# Patient Record
Sex: FEMALE | Race: WHITE | HISPANIC OR LATINO | Employment: FULL TIME | URBAN - METROPOLITAN AREA
[De-identification: names, ages, dates, MRNs, and addresses within clinical notes are randomized per-mention and may not be internally consistent; named-entity substitution may affect disease eponyms.]

---

## 2017-05-09 ENCOUNTER — OFFICE VISIT (OUTPATIENT)
Dept: OBSTETRICS AND GYNECOLOGY | Facility: CLINIC | Age: 27
End: 2017-05-09
Payer: MEDICAID

## 2017-05-09 VITALS — DIASTOLIC BLOOD PRESSURE: 72 MMHG | WEIGHT: 219.81 LBS | SYSTOLIC BLOOD PRESSURE: 118 MMHG

## 2017-05-09 DIAGNOSIS — N97.9 INFERTILITY, FEMALE: Primary | ICD-10-CM

## 2017-05-09 PROCEDURE — 99212 OFFICE O/P EST SF 10 MIN: CPT | Mod: S$PBB,,, | Performed by: OBSTETRICS & GYNECOLOGY

## 2017-05-09 PROCEDURE — 99999 PR PBB SHADOW E&M-EST. PATIENT-LVL III: CPT | Mod: PBBFAC,,, | Performed by: OBSTETRICS & GYNECOLOGY

## 2017-05-09 PROCEDURE — 99213 OFFICE O/P EST LOW 20 MIN: CPT | Mod: PBBFAC,PO | Performed by: OBSTETRICS & GYNECOLOGY

## 2017-05-09 RX ORDER — CLOMIPHENE CITRATE 50 MG/1
TABLET ORAL
Qty: 5 TABLET | Refills: 3 | Status: SHIPPED | OUTPATIENT
Start: 2017-05-09 | End: 2017-09-26

## 2017-05-09 NOTE — MR AVS SNAPSHOT
[place] [facility]  [address] [facility] [place], [address]  5th Floor [place] [address]  Phone: [phone]                  Brannon Lei   2017 1:00 PM   Office Visit    Description:  Female : 1990   Provider:  Kaila Cuevas MD   Department:  [place] [facility]           Reason for Visit     Consult           Diagnoses this Visit        Comments    Infertility, female    -  Primary            To Do List           Goals (5 Years of Data)     None       These Medications        Disp Refills Start End    clomiPHENE (CLOMID) 50 mg tablet 5 tablet 3 2017     Take 1 tablet in the am from Day 3 to Day 7 of your cycle. Have sex every other day for one week starting five days after last pill.    [facility] [address] [place] [address] AIRLINE  AT [facility] & AIRLINE Ph #: [phone]         [facility] On Call     [facility] On Call Nurse Care Line -  Assistance  Unless otherwise directed by your provider, please contact [facility], our nurse care line that is available for  assistance.     Registered nurses in the [facility] provide: appointment scheduling, clinical advisement, health education, and other advisory services.  Call: [phone] (toll free)               Medications           Message regarding Medications     Verify the changes and/or additions to your medication regime listed below are the same as discussed with your clinician today.  If any of these changes or additions are incorrect, please notify your healthcare provider.        START taking these NEW medications        Refills    clomiPHENE (CLOMID) 50 mg tablet 3    Sig: Take 1 tablet in the am from Day 3 to Day 7 of your cycle. Have sex every other day for one week starting five days after last pill.    Class: Normal      STOP taking these medications     FLONASE ALLERGY RELIEF 50 mcg/actuation nasal spray     hydrocodone-acetaminophen 5-325mg (NORCO) 5-325 mg per  tablet     ibuprofen (ADVIL,MOTRIN) 800 MG tablet Take 800 mg by mouth 3 (three) times daily.    ibuprofen (ADVIL,MOTRIN) 800 MG tablet Take 1 tablet (800 mg total) by mouth every 8 (eight) hours as needed for Pain.    misoprostol (CYTOTEC) 200 MCG Tab Place 2 tablets by mouth every 6 hours for 3 days    norgestimate-ethinyl estradiol (ORTHO TRI-CYCLEN,TRI-SPRINTEC) 0.18/0.215/0.25 mg-35 mcg (28) tablet     norgestimate-ethinyl estradiol (ORTHO-CYCLEN) 0.25-35 mg-mcg per tablet Take 1 tablet by mouth once daily.    oxycodone-acetaminophen (ROXICET) 5-325 mg per tablet Take 1 tablet by mouth every 6 (six) hours as needed for Pain.           Verify that the below list of medications is an accurate representation of the medications you are currently taking.  If none reported, the list may be blank. If incorrect, please contact your healthcare provider. Carry this list with you in case of emergency.           Current Medications     clomiPHENE (CLOMID) 50 mg tablet Take 1 tablet in the am from Day 3 to Day 7 of your cycle. Have sex every other day for one week starting five days after last pill.           Clinical Reference Information           Prenatal Vitals     Enc. Date GA Prenatal Vitals Prenatal Pulse Pain Level Urine Albumin/Glucose Edema Presentation Dilation/Effacement/Station    5/9/17  118/72 / 99.7 kg (219 lb 12.8 oz)           12/22/16  116/76 / 94.8 kg (208 lb 15.9 oz)           12/14/16  112/68 / 99 kg (218 lb 4.1 oz)   8        12/8/16  92/62 / 98 kg (216 lb 0.8 oz)   0 Negative / Negative         Your Vitals Were     BP Weight Last Period             118/72 99.7 kg (219 lb 12.8 oz) 04/11/2017         Blood Pressure          Most Recent Value    BP  118/72      Allergies as of 5/9/2017     No Known Allergies      Immunizations Administered on Date of Encounter - 5/9/2017     None      [facility] Sign-Up     Activating your [facility] account is as easy as 1-2-3!     1) Visit [email or web address], select Sign Up  Now, enter this activation code and your date of birth, then select Next.  [email or web address]  Expires: 6/23/2017  2:20 PM      2) Create a username and password to use when you visit [facility] in the future and select a security question in case you lose your password and select Next.    3) Enter your e-mail address and click Sign Up!    Additional Information  If you have questions, please e-mail [email or web address] or call [phone] to talk to our [facility] staff. Remember, [facility] is NOT to be used for urgent needs. For medical emergencies, dial 911.         Instructions    Ovulation predictor kit       Language Assistance Services     ATTENTION: Language assistance services are available, free of charge. Please call [phone].      ATENCIÓN: Si habla lisa, tiene a bronson disposición servicios gratuitos de asistencia lingüística. Llame al [phone].     CHÚ Ý: N?u b?n nói Ti?ng Vi?t, có các d?ch v? h? tr? ngôn ng? mi?n phí dành cho b?n. G?i s? [phone].         [facility] complies with applicable Federal civil rights laws and does not discriminate on the basis of race, color, national origin, age, disability, or sex.

## 2017-05-09 NOTE — PROGRESS NOTES
Patient concerned that she has not had success since her SAB in Dec 2016.  Reports cycles q month. Using samreen on her phone.  We have reviewed the samreen together.  It is possible that patient is not sexually active at times when she is ovulating.  Having sex too late in the cycle.  Encouraged to use ovulation predictor kit.  rx for clomid provided (on patient's insistence).  Instructed on use.    F/u as needed.    margarito ordaz MD

## 2017-07-08 DIAGNOSIS — R10.9 ABDOMINAL PAIN, UNSPECIFIED LOCATION: ICD-10-CM

## 2017-07-28 RX ORDER — IBUPROFEN 800 MG/1
TABLET ORAL
Qty: 30 TABLET | Refills: 6 | Status: SHIPPED | OUTPATIENT
Start: 2017-07-28 | End: 2017-09-26

## 2017-09-05 ENCOUNTER — OFFICE VISIT (OUTPATIENT)
Dept: OBSTETRICS AND GYNECOLOGY | Facility: CLINIC | Age: 27
End: 2017-09-05
Payer: MEDICAID

## 2017-09-05 VITALS — DIASTOLIC BLOOD PRESSURE: 64 MMHG | SYSTOLIC BLOOD PRESSURE: 112 MMHG | WEIGHT: 227.31 LBS

## 2017-09-05 DIAGNOSIS — Z34.01 ENCOUNTER FOR SUPERVISION OF NORMAL FIRST PREGNANCY IN FIRST TRIMESTER: Primary | ICD-10-CM

## 2017-09-05 DIAGNOSIS — R11.0 NAUSEA: ICD-10-CM

## 2017-09-05 DIAGNOSIS — N91.2 AMENORRHEA: ICD-10-CM

## 2017-09-05 PROCEDURE — 99204 OFFICE O/P NEW MOD 45 MIN: CPT | Mod: TH,S$PBB,, | Performed by: OBSTETRICS & GYNECOLOGY

## 2017-09-05 PROCEDURE — 87086 URINE CULTURE/COLONY COUNT: CPT

## 2017-09-05 PROCEDURE — 99999 PR PBB SHADOW E&M-EST. PATIENT-LVL III: CPT | Mod: PBBFAC,,, | Performed by: OBSTETRICS & GYNECOLOGY

## 2017-09-05 PROCEDURE — 3008F BODY MASS INDEX DOCD: CPT | Mod: ,,, | Performed by: OBSTETRICS & GYNECOLOGY

## 2017-09-05 PROCEDURE — 87591 N.GONORRHOEAE DNA AMP PROB: CPT

## 2017-09-05 PROCEDURE — 99213 OFFICE O/P EST LOW 20 MIN: CPT | Mod: PBBFAC,PO | Performed by: OBSTETRICS & GYNECOLOGY

## 2017-09-05 RX ORDER — PROMETHAZINE HYDROCHLORIDE 12.5 MG/1
12.5 TABLET ORAL EVERY 6 HOURS PRN
Qty: 30 TABLET | Refills: 1 | Status: SHIPPED | OUTPATIENT
Start: 2017-09-05 | End: 2017-10-05

## 2017-09-05 NOTE — PROGRESS NOTES
27 yo  female with amenorrhea who presents to confirm her pregnancy.  First pregnancy earlier this year was a spontaneous miscarriage.  No other concerns today.  No changes in her PMH since her last visit here.    ROS:  GENERAL: Denies weight gain or weight loss. Feeling well overall.   SKIN: Denies rash or lesions.   CHEST: Denies chest pain or shortness of breath.   CARDIOVASCULAR: Denies palpitations or left sided chest pain.   ABDOMEN: No abdominal pain, constipation, diarrhea, nausea, vomiting or rectal bleeding.   URINARY: No frequency, dysuria, hematuria, or burning on urination.  REPRODUCTIVE: See HPI.   BREASTS:  denies pain, lumps, or nipple discharge.   HEMATOLOGIC: No easy bruisability or excessive bleeding.   MUSCULOSKELETAL: Denies joint pain or swelling.   NEUROLOGIC: Denies syncope or weakness.   PSYCHIATRIC: Denies depression, anxiety or mood swings.     PE  /64   Wt 103.1 kg (227 lb 4.7 oz)   LMP 2017   APPEARANCE: Well nourished, well developed, in no acute distress.  ABDOMEN: Soft. No tenderness or masses. No hepatosplenomegaly. No hernias.  BREASTS: Symmetrical, no skin changes or visible lesions. No palpable masses, nipple discharge or adenopathy bilaterally.  PELVIC: Normal external female genitalia without lesions. Normal hair distribution. Adequate perineal body, normal urethral meatus. Vagina moist and well rugated without lesions or discharge. Cervix pink and without lesions. No significant cystocele or rectocele. Bimanual exam showed uterus normal size, shape, position, mobile and nontender. Adnexa without masses or tenderness. Urethra and bladder normal.  EXTREMITIES: No clubbing cyanosis or edema.    AP:  Amenorrhea  Office UPT positive, based on LMP, possible EDC 18  Dating scan ordered  New Ob labs ordered  rx for phenergan for nausea provided  H/o SAB  Obesity    F/u in 3 wks for dating scan    margarito ordaz MD

## 2017-09-06 ENCOUNTER — LAB VISIT (OUTPATIENT)
Dept: LAB | Facility: HOSPITAL | Age: 27
End: 2017-09-06
Attending: OBSTETRICS & GYNECOLOGY
Payer: MEDICAID

## 2017-09-06 DIAGNOSIS — Z34.01 ENCOUNTER FOR SUPERVISION OF NORMAL FIRST PREGNANCY IN FIRST TRIMESTER: ICD-10-CM

## 2017-09-06 LAB
ABO + RH BLD: NORMAL
BASOPHILS # BLD AUTO: 0.02 K/UL
BASOPHILS NFR BLD: 0.2 %
BLD GP AB SCN CELLS X3 SERPL QL: NORMAL
C TRACH DNA SPEC QL NAA+PROBE: NOT DETECTED
DIFFERENTIAL METHOD: ABNORMAL
EOSINOPHIL # BLD AUTO: 0.2 K/UL
EOSINOPHIL NFR BLD: 1.9 %
ERYTHROCYTE [DISTWIDTH] IN BLOOD BY AUTOMATED COUNT: 14.1 %
HCG INTACT+B SERPL-ACNC: 1102 MIU/ML
HCT VFR BLD AUTO: 34.9 %
HGB BLD-MCNC: 11.7 G/DL
LYMPHOCYTES # BLD AUTO: 3.1 K/UL
LYMPHOCYTES NFR BLD: 38.3 %
MCH RBC QN AUTO: 28.4 PG
MCHC RBC AUTO-ENTMCNC: 33.5 G/DL
MCV RBC AUTO: 85 FL
MONOCYTES # BLD AUTO: 0.6 K/UL
MONOCYTES NFR BLD: 6.9 %
N GONORRHOEA DNA SPEC QL NAA+PROBE: NOT DETECTED
NEUTROPHILS # BLD AUTO: 4.2 K/UL
NEUTROPHILS NFR BLD: 52.6 %
PLATELET # BLD AUTO: 265 K/UL
PMV BLD AUTO: 10.8 FL
RBC # BLD AUTO: 4.12 M/UL
WBC # BLD AUTO: 8.06 K/UL

## 2017-09-06 PROCEDURE — 86850 RBC ANTIBODY SCREEN: CPT

## 2017-09-06 PROCEDURE — 81220 CFTR GENE COM VARIANTS: CPT

## 2017-09-06 PROCEDURE — 86900 BLOOD TYPING SEROLOGIC ABO: CPT

## 2017-09-06 PROCEDURE — 86787 VARICELLA-ZOSTER ANTIBODY: CPT

## 2017-09-06 PROCEDURE — 86703 HIV-1/HIV-2 1 RESULT ANTBDY: CPT

## 2017-09-06 PROCEDURE — 86762 RUBELLA ANTIBODY: CPT

## 2017-09-06 PROCEDURE — 83020 HEMOGLOBIN ELECTROPHORESIS: CPT

## 2017-09-06 PROCEDURE — 87340 HEPATITIS B SURFACE AG IA: CPT

## 2017-09-06 PROCEDURE — 86592 SYPHILIS TEST NON-TREP QUAL: CPT

## 2017-09-06 PROCEDURE — 84702 CHORIONIC GONADOTROPIN TEST: CPT

## 2017-09-06 PROCEDURE — 85025 COMPLETE CBC W/AUTO DIFF WBC: CPT

## 2017-09-06 PROCEDURE — 83021 HEMOGLOBIN CHROMOTOGRAPHY: CPT

## 2017-09-07 ENCOUNTER — TELEPHONE (OUTPATIENT)
Dept: OBSTETRICS AND GYNECOLOGY | Facility: CLINIC | Age: 27
End: 2017-09-07

## 2017-09-07 LAB
BACTERIA UR CULT: NORMAL
HBV SURFACE AG SERPL QL IA: NEGATIVE
HIV 1+2 AB+HIV1 P24 AG SERPL QL IA: NEGATIVE
RPR SER QL: NORMAL
RUBV IGG SER-ACNC: 10.2 IU/ML
RUBV IGG SER-IMP: REACTIVE
VARICELLA INTERPRETATION: POSITIVE
VARICELLA ZOSTER IGG: 2.86 ISR

## 2017-09-07 NOTE — TELEPHONE ENCOUNTER
Returned patients call.   Patient notified of normal lab values. Patient verbalized understanding and will follow up in 3 weeks.

## 2017-09-07 NOTE — TELEPHONE ENCOUNTER
----- Message from Adrienne Valle sent at 9/7/2017  1:16 PM CDT -----  Contact: Self/ [phone]  Patient would like to speak with you about getting her lab results. Please advise.

## 2017-09-08 LAB — CFTR MUT ANL BLD/T: NORMAL

## 2017-09-11 LAB
HGB A2 MFR BLD HPLC: 2.6 %
HGB FRACT BLD ELPH-IMP: NORMAL
HGB FRACT BLD ELPH-IMP: NORMAL

## 2017-09-14 ENCOUNTER — TELEPHONE (OUTPATIENT)
Dept: OBSTETRICS AND GYNECOLOGY | Facility: CLINIC | Age: 27
End: 2017-09-14

## 2017-09-14 NOTE — TELEPHONE ENCOUNTER
----- Message from Pooja Lorenzo sent at 9/14/2017  3:18 PM CDT -----  No. [phone]    Patient is 5 weeks pregnant.   Patient's ankles are very swollen.

## 2017-09-14 NOTE — TELEPHONE ENCOUNTER
Advised pt to increase her fluid intake and elevating her feet. Pt is complaining of having a headache but it went away with a tylenol. Advised pt that if she begins having blurry vision or headaches that do not go away with tylenol she needs to call back. Please give any further recommendations

## 2017-09-26 ENCOUNTER — OFFICE VISIT (OUTPATIENT)
Dept: OBSTETRICS AND GYNECOLOGY | Facility: CLINIC | Age: 27
End: 2017-09-26
Payer: MEDICAID

## 2017-09-26 ENCOUNTER — ROUTINE PRENATAL (OUTPATIENT)
Dept: OBSTETRICS AND GYNECOLOGY | Facility: CLINIC | Age: 27
End: 2017-09-26
Payer: MEDICAID

## 2017-09-26 VITALS — SYSTOLIC BLOOD PRESSURE: 110 MMHG | DIASTOLIC BLOOD PRESSURE: 72 MMHG

## 2017-09-26 DIAGNOSIS — Z34.01 ENCOUNTER FOR SUPERVISION OF NORMAL FIRST PREGNANCY IN FIRST TRIMESTER: Primary | ICD-10-CM

## 2017-09-26 DIAGNOSIS — Z3A.08 8 WEEKS GESTATION OF PREGNANCY: ICD-10-CM

## 2017-09-26 DIAGNOSIS — Z36.87 UNSURE OF LMP (LAST MENSTRUAL PERIOD) AS REASON FOR ULTRASOUND SCAN: Primary | ICD-10-CM

## 2017-09-26 DIAGNOSIS — Z36.89 ENCOUNTER TO ESTABLISH GESTATIONAL AGE USING ULTRASOUND: ICD-10-CM

## 2017-09-26 DIAGNOSIS — Z34.01 ENCOUNTER FOR SUPERVISION OF NORMAL FIRST PREGNANCY IN FIRST TRIMESTER: ICD-10-CM

## 2017-09-26 PROCEDURE — 76817 TRANSVAGINAL US OBSTETRIC: CPT | Mod: PBBFAC,PO

## 2017-09-26 PROCEDURE — 76817 TRANSVAGINAL US OBSTETRIC: CPT | Mod: 26,S$PBB,, | Performed by: OBSTETRICS & GYNECOLOGY

## 2017-09-26 PROCEDURE — 99999 PR PBB SHADOW E&M-EST. PATIENT-LVL I: CPT | Mod: PBBFAC,,, | Performed by: OBSTETRICS & GYNECOLOGY

## 2017-09-26 PROCEDURE — 99211 OFF/OP EST MAY X REQ PHY/QHP: CPT | Mod: PBBFAC,PO | Performed by: OBSTETRICS & GYNECOLOGY

## 2017-09-26 PROCEDURE — 3008F BODY MASS INDEX DOCD: CPT | Mod: ,,, | Performed by: OBSTETRICS & GYNECOLOGY

## 2017-09-26 PROCEDURE — 99213 OFFICE O/P EST LOW 20 MIN: CPT | Mod: 25,TH,S$PBB, | Performed by: OBSTETRICS & GYNECOLOGY

## 2017-09-26 NOTE — PROGRESS NOTES
No Ob complaints today.    25 yo  female @ 8 wks by 8 wk leighanno (EDC  who presents for f/u OB visit.    AP:  1) SIUP  -s/p official dating scan  -Rh positive    2) h/o SAB  3) obesity    F/u in 4 wks, discuss genetic testing at next visit    margarito ordaz MD

## 2017-10-23 ENCOUNTER — ROUTINE PRENATAL (OUTPATIENT)
Dept: OBSTETRICS AND GYNECOLOGY | Facility: CLINIC | Age: 27
End: 2017-10-23
Payer: MEDICAID

## 2017-10-23 VITALS — SYSTOLIC BLOOD PRESSURE: 120 MMHG | DIASTOLIC BLOOD PRESSURE: 78 MMHG | WEIGHT: 225.5 LBS

## 2017-10-23 DIAGNOSIS — Z3A.11 11 WEEKS GESTATION OF PREGNANCY: ICD-10-CM

## 2017-10-23 DIAGNOSIS — Z34.01 ENCOUNTER FOR SUPERVISION OF NORMAL FIRST PREGNANCY IN FIRST TRIMESTER: Primary | ICD-10-CM

## 2017-10-23 PROCEDURE — 99213 OFFICE O/P EST LOW 20 MIN: CPT | Mod: TH,S$PBB,, | Performed by: OBSTETRICS & GYNECOLOGY

## 2017-10-23 PROCEDURE — 99212 OFFICE O/P EST SF 10 MIN: CPT | Mod: PBBFAC,PO | Performed by: OBSTETRICS & GYNECOLOGY

## 2017-10-23 PROCEDURE — 99999 PR PBB SHADOW E&M-EST. PATIENT-LVL II: CPT | Mod: PBBFAC,,, | Performed by: OBSTETRICS & GYNECOLOGY

## 2017-10-23 NOTE — PROGRESS NOTES
No Ob complaints today.     25 yo  female @ 11.6 wks by 8 wk sono (EDC  who presents for f/u OB visit.     AP:  1) SIUP  -s/p official dating scan  -Rh positive  -desires genetic testing: scheduled     2) h/o SAB    3) obesity     F/u in 1 wk MFM  F/u in 4 wks Ob visit Dr Mason ordaz MD

## 2017-11-01 ENCOUNTER — OFFICE VISIT (OUTPATIENT)
Dept: MATERNAL FETAL MEDICINE | Facility: HOSPITAL | Age: 27
End: 2017-11-01
Payer: MEDICAID

## 2017-11-01 ENCOUNTER — LAB VISIT (OUTPATIENT)
Dept: LAB | Facility: HOSPITAL | Age: 27
End: 2017-11-01
Attending: OBSTETRICS & GYNECOLOGY
Payer: MEDICAID

## 2017-11-01 VITALS
HEIGHT: 61 IN | HEART RATE: 87 BPM | SYSTOLIC BLOOD PRESSURE: 118 MMHG | BODY MASS INDEX: 42.1 KG/M2 | DIASTOLIC BLOOD PRESSURE: 60 MMHG | WEIGHT: 223 LBS

## 2017-11-01 DIAGNOSIS — O09.299 PRIOR POOR OBSTETRICAL HISTORY, ANTEPARTUM: ICD-10-CM

## 2017-11-01 DIAGNOSIS — Z34.01 ENCOUNTER FOR SUPERVISION OF NORMAL FIRST PREGNANCY IN FIRST TRIMESTER: ICD-10-CM

## 2017-11-01 PROCEDURE — 76813 OB US NUCHAL MEAS 1 GEST: CPT

## 2017-11-01 PROCEDURE — 99213 OFFICE O/P EST LOW 20 MIN: CPT | Mod: 25,TH,S$PBB, | Performed by: OBSTETRICS & GYNECOLOGY

## 2017-11-01 PROCEDURE — 81508 FTL CGEN ABNOR TWO PROTEINS: CPT

## 2017-11-01 PROCEDURE — 76813 OB US NUCHAL MEAS 1 GEST: CPT | Mod: 26,,, | Performed by: OBSTETRICS & GYNECOLOGY

## 2017-11-01 PROCEDURE — 36415 COLL VENOUS BLD VENIPUNCTURE: CPT

## 2017-11-01 RX ORDER — FERROUS GLUCONATE 324(38)MG
324 TABLET ORAL
COMMUNITY
End: 2018-06-04

## 2017-11-01 NOTE — PROGRESS NOTES
Consulted by Dr. Cuevas for prior anembryonic pregnancy    27  BISHOP 18; 13w1d    Prenatal record, chart and OB History reviewed  Maternal serum screening today  Pt interviewed and examined  Ultrasound performed  No interval problems  No leaking, bleeding or discharge    OB HX  2016 - SAB without D&C    Counseling  Discussed management options for prenatal screening of aneuploidy and NTD  Pt desires screen.    Impression  =========    Fetal size is consistent with established dating, and normal fetal heart motion is seen. The nuchal translucency is measured, and a sample of  maternal blood will be sent today for the first portion of the integrated screen.    Recommendation  ==============  We recommend evaluation at 20-22 weeks of gestation for feta; growth and development

## 2017-11-03 LAB
# FETUSES US: NORMAL
AGE AT DELIVERY: 26
B-HCG MOM SERPL: NORMAL
B-HCG SERPL-ACNC: 174.5 IU/ML
FET CRL US.MEAS: 75 MM
FET NASAL BONE LENGTH US.MEAS: NORMAL MM
FET NUCHAL FOLD MOM THICKNESS US.MEAS: NORMAL
FET NUCHAL FOLD THICKNESS US.MEAS: 1.6 MM
FET TS 21 RISK FROM MAT AGE: NORMAL
GA (DAYS): 4 D
GA (WEEKS): 13 WK
IDDM PATIENT QL: NORMAL
INTEGRATED SCN PATIENT-IMP: NEGATIVE
PAPP-A MOM SERPL: NORMAL
PAPP-A SERPL-MCNC: 764.4 NG/ML
SEQUENTIAL SCREEN I INTERP.: NORMAL
SMOKING STATUS FTND: NO
TS 18 RISK FETUS: NORMAL
TS 21 RISK FETUS: NORMAL
US DATE: NORMAL

## 2017-11-20 ENCOUNTER — ROUTINE PRENATAL (OUTPATIENT)
Dept: OBSTETRICS AND GYNECOLOGY | Facility: CLINIC | Age: 27
End: 2017-11-20
Payer: MEDICAID

## 2017-11-20 ENCOUNTER — LAB VISIT (OUTPATIENT)
Dept: LAB | Facility: HOSPITAL | Age: 27
End: 2017-11-20
Attending: OBSTETRICS & GYNECOLOGY
Payer: MEDICAID

## 2017-11-20 VITALS — WEIGHT: 225.06 LBS | SYSTOLIC BLOOD PRESSURE: 90 MMHG | BODY MASS INDEX: 42.53 KG/M2 | DIASTOLIC BLOOD PRESSURE: 60 MMHG

## 2017-11-20 DIAGNOSIS — Z3A.15 15 WEEKS GESTATION OF PREGNANCY: ICD-10-CM

## 2017-11-20 DIAGNOSIS — Z34.02 ENCOUNTER FOR SUPERVISION OF NORMAL FIRST PREGNANCY IN SECOND TRIMESTER: Primary | ICD-10-CM

## 2017-11-20 DIAGNOSIS — Z34.02 ENCOUNTER FOR SUPERVISION OF NORMAL FIRST PREGNANCY IN SECOND TRIMESTER: ICD-10-CM

## 2017-11-20 PROCEDURE — 81511 FTL CGEN ABNOR FOUR ANAL: CPT

## 2017-11-20 PROCEDURE — 99213 OFFICE O/P EST LOW 20 MIN: CPT | Mod: TH,S$PBB,, | Performed by: OBSTETRICS & GYNECOLOGY

## 2017-11-20 PROCEDURE — 36415 COLL VENOUS BLD VENIPUNCTURE: CPT

## 2017-11-20 PROCEDURE — 99999 PR PBB SHADOW E&M-EST. PATIENT-LVL I: CPT | Mod: PBBFAC,,, | Performed by: OBSTETRICS & GYNECOLOGY

## 2017-11-20 PROCEDURE — 99211 OFF/OP EST MAY X REQ PHY/QHP: CPT | Mod: PBBFAC,PO | Performed by: OBSTETRICS & GYNECOLOGY

## 2017-11-20 NOTE — PROGRESS NOTES
No Ob complaints today.     25 yo  female @ 15.6 wks by 8 wk sono (EDC  who presents for f/u OB visit.     AP:  1) SIUP  -s/p official dating scan  -Rh positive  -genetic testing: scheduled     2) h/o SAB     3) obesity     F/u in 4 wk MFM  F/u in 8 wks Ob visit Dr Mason ordaz MD

## 2017-11-22 ENCOUNTER — TELEPHONE (OUTPATIENT)
Dept: OBSTETRICS AND GYNECOLOGY | Facility: CLINIC | Age: 27
End: 2017-11-22

## 2017-11-22 LAB
# FETUSES US: NORMAL
AFP MOM SERPL: 1.09
AFP SERPL-MCNC: 28.1 NG/ML
AGE AT DELIVERY: 26
B-HCG MOM SERPL: 2.78
B-HCG SERPL-ACNC: 72.8 IU/ML
COLLECT DATE BLD: NORMAL
COLLECT DATE: NORMAL
FET NASAL BONE LENGTH US.MEAS: NORMAL MM
FET NUCHAL FOLD MOM THICKNESS US.MEAS: 1.29
FET NUCHAL FOLD THICKNESS US.MEAS: 1.6 MM
FET TS 21 RISK FROM MAT AGE: NORMAL
GA (DAYS): 4 D
GA (WEEKS): 16 WK
GA METHOD: NORMAL
GEST. AGE (DAYS) 2ND SAMPLE (SS2): 2
GEST. AGE (WKS) 1ST SAMPLE (SS2): 13
IDDM PATIENT QL: NORMAL
INHIBIN A MOM SERPL: 2.02
INHIBIN A SERPL-MCNC: 276.1 PG/ML
INTEGRATED SCN PATIENT-IMP: NEGATIVE
PAPP-A MOM SERPL: 0.92
PAPP-A SERPL-MCNC: 764.4 NG/ML
SEQUENTIAL SCREEN PART 2 INTERP: NORMAL
TS 18 RISK FETUS: NORMAL
TS 21 RISK FETUS: NORMAL
U ESTRIOL MOM SERPL: 0.97
U ESTRIOL SERPL-MCNC: 0.78 NG/ML

## 2017-11-22 NOTE — TELEPHONE ENCOUNTER
Patient informed that the second part of her sequential testing is negative  Patient verbalized understanding.

## 2017-11-22 NOTE — TELEPHONE ENCOUNTER
----- Message from Kaila Cuevas MD sent at 11/22/2017 12:17 PM CST -----  Inform patient that second part of her sequential testing is negative    Dr cuevas

## 2017-12-20 ENCOUNTER — OFFICE VISIT (OUTPATIENT)
Dept: MATERNAL FETAL MEDICINE | Facility: HOSPITAL | Age: 27
End: 2017-12-20
Attending: OBSTETRICS & GYNECOLOGY
Payer: MEDICAID

## 2017-12-20 VITALS
HEART RATE: 100 BPM | DIASTOLIC BLOOD PRESSURE: 60 MMHG | HEIGHT: 61 IN | SYSTOLIC BLOOD PRESSURE: 110 MMHG | BODY MASS INDEX: 42.48 KG/M2 | WEIGHT: 225 LBS

## 2017-12-20 DIAGNOSIS — O99.891 CURRENT MATERNAL CONDITION AFFECTING PREGNANCY: ICD-10-CM

## 2017-12-20 DIAGNOSIS — Z36.89 ENCOUNTER FOR ULTRASOUND TO CHECK FETAL GROWTH: Primary | ICD-10-CM

## 2017-12-20 DIAGNOSIS — Z36.89 ENCOUNTER FOR ULTRASOUND TO CHECK FETAL GROWTH: ICD-10-CM

## 2017-12-20 DIAGNOSIS — Z36.3 ANTENATAL SCREENING FOR MALFORMATION USING ULTRASONICS: ICD-10-CM

## 2017-12-20 PROCEDURE — 76811 OB US DETAILED SNGL FETUS: CPT | Mod: 26,,, | Performed by: OBSTETRICS & GYNECOLOGY

## 2017-12-20 PROCEDURE — 99499 UNLISTED E&M SERVICE: CPT | Mod: ,,, | Performed by: OBSTETRICS & GYNECOLOGY

## 2017-12-20 NOTE — PROGRESS NOTES
Indication  ========    Fetal anatomy survey (Dr. Kaila Cuevas).    History  ======    General History  Height 155 cm  Height (ft) 5 ft  Height (in) 1 in  Other: BMI 42  SAB x 1  Previous Outcomes  Preg. no. 1  Outcome: Spontaneous miscarriage  Gest. age 11 w + 0 d   2  Para 0  Abortions (A) 1  Miscarriages 1    Pregnancy History  ==============    Maternal Lab Tests  Test: Sequential Screen  Date: 2017  Result: negative  Wants to know gender: yes    Maternal Assessment  =================    Weight 102 kg  Weight (lb) 225 lb  Height 155 cm  Height (ft) 5 ft  Height (in) 1 in  BP syst 110 mmHg  BP diast 60 mmHg  BMI 42.51 kg/m²    Method  ======    Transabdominal ultrasound examination. View: Suboptimal view: limited by maternal body habitus. Suboptimal view: limited by fetal position.    Pregnancy  =========    Armando pregnancy. Number of fetuses: 1.    Dating  ======    LMP on: 2017  Cycle: regular cycle  GA by LMP 19 w + 4 d  BISHOP by LMP: 2018  Ultrasound examination on: 2017  GA by U/S based upon: AC, BPD, Femur, HC  GA by U/S 21 w + 1 d  BISHOP by U/S: 2018  Assigned: Dating performed on 2017, based on the LMP  Assigned GA 19 w + 4 d  Assigned BISHOP: 2018    General Evaluation  ==============    Cardiac activity: present.  bpm.  Fetal movements: visualized.  Presentation: cephalic.  Placenta:  Placental site: posterior.  Umbilical cord: Cord vessels: 3 vessel cord. Cord insertion: placental insertion: normal.  Amniotic fluid: Amount of AF: normal amount.    Fetal Biometry  ============    Fetal Biometry  BPD 48.2 mm 20w 4d Hadlock  .1 mm 20w 6d Hadlock  .1 mm 20w 6d Hadlock  Femur 37.6 mm 22w 0d Hadlock  Cerebellum tr 20.3 mm 20w 1d Dela Cruz  CM 3.5 mm  Nuchal fold 4.54 mm  Humerus 34.3 mm 21w 5d Elian   g  Calculated by: Hadlock (BPD-HC-AC-FL)  EFW (lb) 0 lb  EFW (oz) 15 oz  HC / AC 1.18  FL / BPD 0.78  FL / AC 0.24   bpm    Fetal  Anatomy  ============    Cranium: normal  Lateral ventricles: normal  Choroid plexus: normal  Midline falx: normal  Cavum septi pellucidi: normal  Cerebellum: normal  Cisterna magna: normal  Nuchal fold: normal  Lips: suboptimal  Profile: suboptimal  Nose: suboptimal  4-chamber view: suboptimal  RVOT: suboptimal  LVOT: suboptimal  Heart / Thorax: septum appears wnl  4-chamber view: septum appears intact  Aortic arch: normal  Diaphragm: suboptimal  Cord insertion: normal  Stomach: normal  Kidneys: normal  Bladder: normal  Genitals: normal  Rt renal artery: normal  Lt renal artery: normal  Cervical spine: normal  Thoracic spine: normal  Lumbar spine: normal  Sacral spine: normal  Spine / Skelet.: sub opt transverse spine  Rt upper arm: normal  Rt forearm: normal  Rt hand: visualized  Lt upper arm: normal  Lt forearm: normal  Lt hand: visualized  Rt upper leg: normal  Rt lower leg: normal  Rt foot: normal  Lt upper leg: normal  Lt lower leg: normal  Lt foot: normal  Position of hands: normal  Position of feet: normal  Gender: male  Wants to know gender: yes    Maternal Structures  ===============    Uterus / Cervix  Approach: Transabdominal  Cervical length 59.2 mm    Impression  =========    1. 19w 4d luna intrauterine pregnancy (per BISHOP = 05- based on LMP c/w CRL on 09-)  2. Fetal biometry c/w dates  3. No fetal structural abnormalities appreciated but incomplete for above mentioned organs secondary to  decreased resolution/fetal position  4. Amniotic fluid volume wnl per qualitative assessment  5. Posterior placenta and no previa appreciated  6. Cervical length screen via TAS wnl    Patient counseled on sono evaluation and recommendations for follow up.    Recommendation  ==============    1. I recommend patient's BISHOP be changed to 05- per her sure LMP which was c/w CRL on 09-    2. We will schedule patient for a f/u sono in about 5 wks to complete fetal anatomical survey, interval  fetal growth .

## 2017-12-27 ENCOUNTER — NURSE TRIAGE (OUTPATIENT)
Dept: ADMINISTRATIVE | Facility: CLINIC | Age: 27
End: 2017-12-27

## 2017-12-27 ENCOUNTER — TELEPHONE (OUTPATIENT)
Dept: OBSTETRICS AND GYNECOLOGY | Facility: CLINIC | Age: 27
End: 2017-12-27

## 2017-12-27 NOTE — TELEPHONE ENCOUNTER
----- Message from Adrienne Valle sent at 12/27/2017 11:29 AM CST -----  Contact: Self. [phone]  Patient is returning your call.  Please advise.        Reason for Disposition   MODERATE difficulty breathing (e.g., speaks in phrases, SOB even at rest, pulse 100-120) of new onset or worse than normal    Protocols used: ST BREATHING DIFFICULTY-A-OH     Ms. Lei states she is pregnant and is experiencing sob at rest.

## 2017-12-27 NOTE — TELEPHONE ENCOUNTER
Reason for Disposition   MODERATE difficulty breathing (e.g., speaks in phrases, SOB even at rest, pulse 100-120) of new onset or worse than normal    Protocols used: ST BREATHING DIFFICULTY-A-OH    Ms. Lei states she is pregnant and is experiencing sob at rest.

## 2017-12-27 NOTE — TELEPHONE ENCOUNTER
Tried calling pt. And there was no voicemail available to leave message, need to ask her how she is doing.  Hopefully she will call us back.

## 2018-01-12 ENCOUNTER — TELEPHONE (OUTPATIENT)
Dept: OBSTETRICS AND GYNECOLOGY | Facility: CLINIC | Age: 28
End: 2018-01-12

## 2018-01-12 NOTE — TELEPHONE ENCOUNTER
Pt. Called with possible UTI, she did a home u/a kit test, and its only showing (trace) so I instructed her to try cranberry juice and lots of water, discontinue soft drinks for a few days, if no better to give us a call back.

## 2018-01-15 ENCOUNTER — TELEPHONE (OUTPATIENT)
Dept: OBSTETRICS AND GYNECOLOGY | Facility: CLINIC | Age: 28
End: 2018-01-15

## 2018-01-15 NOTE — TELEPHONE ENCOUNTER
Spoke with pt. Who stated she passed out after eating a sugary breakfast, pancakes with syrup. She is fine now, just concerned about there sugar level. I explained ot her at her visit we will do a glucola test, she said okay and thank you for calling her.

## 2018-01-19 ENCOUNTER — LAB VISIT (OUTPATIENT)
Dept: LAB | Facility: HOSPITAL | Age: 28
End: 2018-01-19
Attending: OBSTETRICS & GYNECOLOGY
Payer: MEDICAID

## 2018-01-19 ENCOUNTER — TELEPHONE (OUTPATIENT)
Dept: OBSTETRICS AND GYNECOLOGY | Facility: CLINIC | Age: 28
End: 2018-01-19

## 2018-01-19 ENCOUNTER — ROUTINE PRENATAL (OUTPATIENT)
Dept: OBSTETRICS AND GYNECOLOGY | Facility: CLINIC | Age: 28
End: 2018-01-19
Payer: MEDICAID

## 2018-01-19 VITALS
SYSTOLIC BLOOD PRESSURE: 110 MMHG | BODY MASS INDEX: 43.41 KG/M2 | WEIGHT: 229.75 LBS | DIASTOLIC BLOOD PRESSURE: 74 MMHG

## 2018-01-19 DIAGNOSIS — Z34.02 ENCOUNTER FOR SUPERVISION OF NORMAL FIRST PREGNANCY IN SECOND TRIMESTER: ICD-10-CM

## 2018-01-19 DIAGNOSIS — Z34.02 ENCOUNTER FOR SUPERVISION OF NORMAL FIRST PREGNANCY IN SECOND TRIMESTER: Primary | ICD-10-CM

## 2018-01-19 DIAGNOSIS — Z3A.23 23 WEEKS GESTATION OF PREGNANCY: ICD-10-CM

## 2018-01-19 LAB
BASOPHILS # BLD AUTO: 0.04 K/UL
BASOPHILS NFR BLD: 0.4 %
DIFFERENTIAL METHOD: ABNORMAL
EOSINOPHIL # BLD AUTO: 0.1 K/UL
EOSINOPHIL NFR BLD: 0.6 %
ERYTHROCYTE [DISTWIDTH] IN BLOOD BY AUTOMATED COUNT: 14.1 %
GLUCOSE SERPL-MCNC: 106 MG/DL
HCT VFR BLD AUTO: 34.6 %
HGB BLD-MCNC: 11.5 G/DL
LYMPHOCYTES # BLD AUTO: 2.4 K/UL
LYMPHOCYTES NFR BLD: 22.5 %
MCH RBC QN AUTO: 29.7 PG
MCHC RBC AUTO-ENTMCNC: 33.2 G/DL
MCV RBC AUTO: 89 FL
MONOCYTES # BLD AUTO: 0.6 K/UL
MONOCYTES NFR BLD: 5.2 %
NEUTROPHILS # BLD AUTO: 7.7 K/UL
NEUTROPHILS NFR BLD: 70.9 %
PLATELET # BLD AUTO: 248 K/UL
PMV BLD AUTO: 11.3 FL
RBC # BLD AUTO: 3.87 M/UL
WBC # BLD AUTO: 10.82 K/UL

## 2018-01-19 PROCEDURE — 36415 COLL VENOUS BLD VENIPUNCTURE: CPT

## 2018-01-19 PROCEDURE — 99213 OFFICE O/P EST LOW 20 MIN: CPT | Mod: TH,S$PBB,, | Performed by: OBSTETRICS & GYNECOLOGY

## 2018-01-19 PROCEDURE — 99212 OFFICE O/P EST SF 10 MIN: CPT | Mod: PBBFAC,TH,PO | Performed by: OBSTETRICS & GYNECOLOGY

## 2018-01-19 PROCEDURE — 85025 COMPLETE CBC W/AUTO DIFF WBC: CPT

## 2018-01-19 PROCEDURE — 82950 GLUCOSE TEST: CPT

## 2018-01-19 PROCEDURE — 99999 PR PBB SHADOW E&M-EST. PATIENT-LVL II: CPT | Mod: PBBFAC,,, | Performed by: OBSTETRICS & GYNECOLOGY

## 2018-01-19 NOTE — TELEPHONE ENCOUNTER
----- Message from Loy Del Castillo sent at 1/19/2018 12:28 PM CST -----  Contact: self/[phone]  Patient is calling to get results from recent test.  Please advise

## 2018-01-19 NOTE — PROGRESS NOTES
Reports round ligament pain and feeling light headed and SOB - patient informed that all symptoms are common with pregnancy.    27 yo  female @ 23.6 wks by first trimester sono (EDC  who presents for f/u OB visit.     AP:  1) SIUP (it's a boy)  -anatomy with MFM  -Rh positive  -genetic testing: negative  -consents for vag/blood signed 2018  -unsure of circ but consents signed 2018     2) h/o SAB     3) obesity     F/u in 1 wk MFM: f/u anatomy  F/u in 4 wks OB visit dr sushma ordaz MD

## 2018-01-19 NOTE — TELEPHONE ENCOUNTER
----- Message from Lisy Rosie sent at 1/19/2018  3:28 PM CST -----  Contact: self, [phone]  Patient is calling to check on her callback request left earlier today for her lab results. Please advise.

## 2018-01-19 NOTE — TELEPHONE ENCOUNTER
Spoke to patient, nervous about lab results, I informed her that Dr will let us know about results and when she does we will contact her.  Patient verbalized understanding

## 2018-01-22 ENCOUNTER — TELEPHONE (OUTPATIENT)
Dept: OBSTETRICS AND GYNECOLOGY | Facility: CLINIC | Age: 28
End: 2018-01-22

## 2018-01-22 NOTE — TELEPHONE ENCOUNTER
----- Message from Guru Aguilar sent at 1/22/2018  3:47 PM CST -----  Contact: [phone]/self  Pt requesting to speak with you concerning her test results.  Please call and advise     Patients wants you to review lab results

## 2018-01-24 ENCOUNTER — TELEPHONE (OUTPATIENT)
Dept: OBSTETRICS AND GYNECOLOGY | Facility: CLINIC | Age: 28
End: 2018-01-24

## 2018-01-24 ENCOUNTER — OFFICE VISIT (OUTPATIENT)
Dept: MATERNAL FETAL MEDICINE | Facility: HOSPITAL | Age: 28
End: 2018-01-24
Attending: OBSTETRICS & GYNECOLOGY
Payer: MEDICAID

## 2018-01-24 VITALS — BODY MASS INDEX: 42.7 KG/M2 | WEIGHT: 226 LBS | SYSTOLIC BLOOD PRESSURE: 122 MMHG | DIASTOLIC BLOOD PRESSURE: 70 MMHG

## 2018-01-24 DIAGNOSIS — O99.891 CURRENT MATERNAL CONDITION AFFECTING PREGNANCY: ICD-10-CM

## 2018-01-24 PROCEDURE — 76816 OB US FOLLOW-UP PER FETUS: CPT | Mod: 26,,, | Performed by: OBSTETRICS & GYNECOLOGY

## 2018-01-24 PROCEDURE — 76816 OB US FOLLOW-UP PER FETUS: CPT

## 2018-01-24 PROCEDURE — 99499 UNLISTED E&M SERVICE: CPT | Mod: ,,, | Performed by: OBSTETRICS & GYNECOLOGY

## 2018-01-24 NOTE — TELEPHONE ENCOUNTER
----- Message from Kaila Cuevas MD sent at 1/22/2018  4:18 PM CST -----  Inform patient:   All your blood tests are normal. No diabetes with the pregnancy.  Your blood count  Looks good.    Dr cuevas

## 2018-01-24 NOTE — PROGRESS NOTES
Indication  ========    BMI 42: f/u anatomy, interval fetal growth, MVP (Dr. Kaila Cuevas).    History  ======    General History  Height 155 cm  Height (ft) 5 ft  Height (in) 1 in  Other: BMI 42  SAB x 1  Previous Outcomes  Preg. no. 1  Outcome: Spontaneous miscarriage  Gest. age 11 w + 0 d   2  Para 0  Abortions (A) 1  Miscarriages 1    Pregnancy History  ==============    Maternal Lab Tests  Test: Sequential Screen  Date: 2017  Result: negative  Wants to know gender: yes    Maternal Assessment  =================    Weight 103 kg  Weight (lb) 226 lb  Height 155 cm  Height (ft) 5 ft  Height (in) 1 in  BP syst 122 mmHg  BP diast 70 mmHg  BMI 42.70 kg/m²    Method  ======    Transabdominal ultrasound examination. View: Good view.    Pregnancy  =========    Armando pregnancy. Number of fetuses: 1.    Dating  ======    LMP on: 2017  Cycle: regular cycle  GA by LMP 24 w + 4 d  BISHOP by LMP: 2018  Ultrasound examination on: 2018  GA by U/S based upon: AC, BPD, Femur, HC  GA by U/S 26 w + 4 d  BISHOP by U/S: 2018  Assigned: Dating performed on 2017, based on the LMP  Assigned GA 24 w + 4 d  Assigned BISHOP: 2018    General Evaluation  ==============    Cardiac activity: present.  bpm.  Fetal movements: visualized.  Presentation: breech.  Placenta:  Placental site: posterior.  Umbilical cord: Cord vessels: 3 vessel cord.  Amniotic fluid: Amount of AF: normal amount.    Fetal Biometry  ============    Fetal Biometry  BPD 64.7 mm 26w 1d Hadlock  .6 mm 26w 5d Hadlock  .4 mm 26w 5d Hadlock  Femur 49.2 mm 26w 4d Hadlock   g 81% Forrest  Calculated by: Hadlock (BPD-HC-AC-FL)  EFW (lb) 2 lb  EFW (oz) 2 oz  HC / AC 1.10  FL / BPD 0.76  FL / AC 0.22   bpm    Fetal Anatomy  ===========    Cranium: normal  Posterior fossa: normal  Lips: normal  Profile: normal  Nose: normal  4-chamber  view: normal  RVOT: normal  LVOT: normal  Diaphragm: normal  Stomach: normal  Kidneys: normal  Bladder: normal  Genitals: normal  Spine / Skelet.: transverse spine appears wnl  Gender: male  Wants to know gender: yes  Other: A full anatomy survey previously performed.    Impression  =========    1. 24w 4d luna intrauterine pregnancy  2. Interval fetal growth with EFW = 965 gms at 81%  3. Breech presentation  4. limited f/u anatomy: no abnormalities appreciated for above organs evaluated  5. Amniotic fluid volume wnl    technically difficult sono .    Recommendation  ==============    BMI 42:  recommend a f/u sono at 32-34 wks gestation for interval fetal growth, MVP         this sono can be performed in your office and refer to MFM as clinically indicated.

## 2018-02-16 ENCOUNTER — ROUTINE PRENATAL (OUTPATIENT)
Dept: OBSTETRICS AND GYNECOLOGY | Facility: CLINIC | Age: 28
End: 2018-02-16
Payer: MEDICAID

## 2018-02-16 VITALS
DIASTOLIC BLOOD PRESSURE: 70 MMHG | SYSTOLIC BLOOD PRESSURE: 110 MMHG | BODY MASS INDEX: 44.24 KG/M2 | WEIGHT: 234.13 LBS

## 2018-02-16 DIAGNOSIS — O26.843 SIZE OF FETUS INCONSISTENT WITH DATES IN THIRD TRIMESTER: ICD-10-CM

## 2018-02-16 DIAGNOSIS — Z3A.27 27 WEEKS GESTATION OF PREGNANCY: ICD-10-CM

## 2018-02-16 DIAGNOSIS — Z34.03 ENCOUNTER FOR SUPERVISION OF NORMAL FIRST PREGNANCY IN THIRD TRIMESTER: Primary | ICD-10-CM

## 2018-02-16 PROCEDURE — 3008F BODY MASS INDEX DOCD: CPT | Mod: ,,, | Performed by: OBSTETRICS & GYNECOLOGY

## 2018-02-16 PROCEDURE — 99999 PR PBB SHADOW E&M-EST. PATIENT-LVL II: CPT | Mod: PBBFAC,,, | Performed by: OBSTETRICS & GYNECOLOGY

## 2018-02-16 PROCEDURE — 99213 OFFICE O/P EST LOW 20 MIN: CPT | Mod: TH,S$PBB,, | Performed by: OBSTETRICS & GYNECOLOGY

## 2018-02-16 PROCEDURE — 99212 OFFICE O/P EST SF 10 MIN: CPT | Mod: PBBFAC,TH,PO | Performed by: OBSTETRICS & GYNECOLOGY

## 2018-02-16 NOTE — PROGRESS NOTES
Patient continues to complain of SOB, heart racing, swelling in her legs.  SOB mostly when she wakes in the am.  Patient reassured that these symptoms are common with pregnancy. Instructed to go to L&D if SOB gets VERY bad.     Size>>dates, growth scan ordered    25 yo  female @ 27.6 wks by first trimester sono (EDC  who presents for f/u OB visit.     AP:  1) SIUP (it's a boy)  -anatomy with MFM  -Rh positive  -genetic testing: negative  -consents for vag/blood signed 2018  -unsure of circ but consents signed 2018  -1 hr GTT wnl     2) h/o SAB     3) obesity     F/u in 3 wks, growth, OB visit    margarito oradz MD

## 2018-03-09 ENCOUNTER — PROCEDURE VISIT (OUTPATIENT)
Dept: OBSTETRICS AND GYNECOLOGY | Facility: CLINIC | Age: 28
End: 2018-03-09
Payer: MEDICAID

## 2018-03-09 ENCOUNTER — ROUTINE PRENATAL (OUTPATIENT)
Dept: OBSTETRICS AND GYNECOLOGY | Facility: CLINIC | Age: 28
End: 2018-03-09
Payer: MEDICAID

## 2018-03-09 VITALS
WEIGHT: 238.56 LBS | SYSTOLIC BLOOD PRESSURE: 110 MMHG | DIASTOLIC BLOOD PRESSURE: 74 MMHG | BODY MASS INDEX: 45.07 KG/M2

## 2018-03-09 DIAGNOSIS — Z3A.30 30 WEEKS GESTATION OF PREGNANCY: ICD-10-CM

## 2018-03-09 DIAGNOSIS — O26.843 SIZE OF FETUS INCONSISTENT WITH DATES IN THIRD TRIMESTER: ICD-10-CM

## 2018-03-09 DIAGNOSIS — Z34.03 ENCOUNTER FOR SUPERVISION OF NORMAL FIRST PREGNANCY IN THIRD TRIMESTER: Primary | ICD-10-CM

## 2018-03-09 PROCEDURE — 99213 OFFICE O/P EST LOW 20 MIN: CPT | Mod: TH,25,S$PBB, | Performed by: OBSTETRICS & GYNECOLOGY

## 2018-03-09 PROCEDURE — 76816 OB US FOLLOW-UP PER FETUS: CPT | Mod: PBBFAC,PO

## 2018-03-09 PROCEDURE — 99999 PR PBB SHADOW E&M-EST. PATIENT-LVL II: CPT | Mod: PBBFAC,,, | Performed by: OBSTETRICS & GYNECOLOGY

## 2018-03-09 PROCEDURE — 76816 OB US FOLLOW-UP PER FETUS: CPT | Mod: 26,S$PBB,, | Performed by: OBSTETRICS & GYNECOLOGY

## 2018-03-09 PROCEDURE — 99212 OFFICE O/P EST SF 10 MIN: CPT | Mod: PBBFAC,TH,PO,25 | Performed by: OBSTETRICS & GYNECOLOGY

## 2018-03-09 NOTE — PROGRESS NOTES
Good fetal movement.  Growth scan on 3/9/2018 is at 51%    25 yo  female @ 30.6 wks by first trimester sono (EDC  who presents for f/u OB visit.     AP:  1) SIUP (it's a boy)  -anatomy with MFM  -Rh positive  -genetic testing: negative  -consents for vag/blood signed 2018  -unsure of circ but consents signed 2018  -1 hr GTT wnl     2) h/o SAB     3) obesity     4) PP  Breastfeeding  Contraception: OCPS    F/u in 3 wks OB visit    margarito ordaz MD

## 2018-03-13 ENCOUNTER — TELEPHONE (OUTPATIENT)
Dept: OBSTETRICS AND GYNECOLOGY | Facility: CLINIC | Age: 28
End: 2018-03-13

## 2018-03-13 NOTE — TELEPHONE ENCOUNTER
Returned patients call. Patient states she has a sore throat, sneezing, and congestion. She denies fever. Informed patient to increase fluid intake and she can take tylenol cold/sinus. Also informed she can use cough drops if needed. Patient verbalized understanding.

## 2018-03-13 NOTE — TELEPHONE ENCOUNTER
----- Message from Bushra Boyle sent at 3/13/2018 10:50 AM CDT -----  Contact: self / [phone]  She has a cold she needs to know what she can take? Please advise    Have a great day!!!

## 2018-03-29 ENCOUNTER — ROUTINE PRENATAL (OUTPATIENT)
Dept: OBSTETRICS AND GYNECOLOGY | Facility: CLINIC | Age: 28
End: 2018-03-29
Payer: MEDICAID

## 2018-03-29 VITALS — WEIGHT: 245.56 LBS | SYSTOLIC BLOOD PRESSURE: 100 MMHG | BODY MASS INDEX: 46.4 KG/M2 | DIASTOLIC BLOOD PRESSURE: 68 MMHG

## 2018-03-29 DIAGNOSIS — Z34.03 ENCOUNTER FOR SUPERVISION OF NORMAL FIRST PREGNANCY IN THIRD TRIMESTER: Primary | ICD-10-CM

## 2018-03-29 DIAGNOSIS — Z3A.33 33 WEEKS GESTATION OF PREGNANCY: ICD-10-CM

## 2018-03-29 DIAGNOSIS — O26.843 SIZE OF FETUS INCONSISTENT WITH DATES IN THIRD TRIMESTER: ICD-10-CM

## 2018-03-29 PROCEDURE — 99999 PR PBB SHADOW E&M-EST. PATIENT-LVL II: CPT | Mod: PBBFAC,,, | Performed by: OBSTETRICS & GYNECOLOGY

## 2018-03-29 PROCEDURE — 99213 OFFICE O/P EST LOW 20 MIN: CPT | Mod: TH,S$PBB,, | Performed by: OBSTETRICS & GYNECOLOGY

## 2018-03-29 PROCEDURE — 99212 OFFICE O/P EST SF 10 MIN: CPT | Mod: PBBFAC,TH,PO | Performed by: OBSTETRICS & GYNECOLOGY

## 2018-04-03 ENCOUNTER — PATIENT MESSAGE (OUTPATIENT)
Dept: OBSTETRICS AND GYNECOLOGY | Facility: CLINIC | Age: 28
End: 2018-04-03

## 2018-04-03 ENCOUNTER — PROCEDURE VISIT (OUTPATIENT)
Dept: OBSTETRICS AND GYNECOLOGY | Facility: CLINIC | Age: 28
End: 2018-04-03
Payer: MEDICAID

## 2018-04-03 ENCOUNTER — TELEPHONE (OUTPATIENT)
Dept: OBSTETRICS AND GYNECOLOGY | Facility: CLINIC | Age: 28
End: 2018-04-03

## 2018-04-03 DIAGNOSIS — O99.213 OBESITY AFFECTING PREGNANCY, ANTEPARTUM, THIRD TRIMESTER: Primary | ICD-10-CM

## 2018-04-03 DIAGNOSIS — O26.843 SIZE OF FETUS INCONSISTENT WITH DATES IN THIRD TRIMESTER: ICD-10-CM

## 2018-04-03 PROCEDURE — 76816 OB US FOLLOW-UP PER FETUS: CPT | Mod: PBBFAC,PO

## 2018-04-03 PROCEDURE — 76816 OB US FOLLOW-UP PER FETUS: CPT | Mod: 26,S$PBB,, | Performed by: OBSTETRICS & GYNECOLOGY

## 2018-04-03 NOTE — TELEPHONE ENCOUNTER
Can you please send me the maternity leave letter. Can you fax it to [phone]. Thank you       Pt needs maternity leave letter. Please advise

## 2018-04-12 ENCOUNTER — ROUTINE PRENATAL (OUTPATIENT)
Dept: OBSTETRICS AND GYNECOLOGY | Facility: CLINIC | Age: 28
End: 2018-04-12
Payer: MEDICAID

## 2018-04-12 ENCOUNTER — LAB VISIT (OUTPATIENT)
Dept: LAB | Facility: HOSPITAL | Age: 28
End: 2018-04-12
Attending: OBSTETRICS & GYNECOLOGY
Payer: MEDICAID

## 2018-04-12 VITALS
SYSTOLIC BLOOD PRESSURE: 110 MMHG | WEIGHT: 249.56 LBS | DIASTOLIC BLOOD PRESSURE: 80 MMHG | BODY MASS INDEX: 47.15 KG/M2

## 2018-04-12 DIAGNOSIS — Z3A.35 35 WEEKS GESTATION OF PREGNANCY: ICD-10-CM

## 2018-04-12 DIAGNOSIS — Z34.03 ENCOUNTER FOR SUPERVISION OF NORMAL FIRST PREGNANCY IN THIRD TRIMESTER: ICD-10-CM

## 2018-04-12 DIAGNOSIS — Z34.03 ENCOUNTER FOR SUPERVISION OF NORMAL FIRST PREGNANCY IN THIRD TRIMESTER: Primary | ICD-10-CM

## 2018-04-12 LAB
BASOPHILS # BLD AUTO: 0.01 K/UL
BASOPHILS NFR BLD: 0.1 %
DIFFERENTIAL METHOD: ABNORMAL
EOSINOPHIL # BLD AUTO: 0.1 K/UL
EOSINOPHIL NFR BLD: 0.8 %
ERYTHROCYTE [DISTWIDTH] IN BLOOD BY AUTOMATED COUNT: 13.7 %
HCT VFR BLD AUTO: 32.7 %
HGB BLD-MCNC: 11 G/DL
LYMPHOCYTES # BLD AUTO: 1.7 K/UL
LYMPHOCYTES NFR BLD: 23 %
MCH RBC QN AUTO: 28.7 PG
MCHC RBC AUTO-ENTMCNC: 33.6 G/DL
MCV RBC AUTO: 85 FL
MONOCYTES # BLD AUTO: 0.4 K/UL
MONOCYTES NFR BLD: 6.1 %
NEUTROPHILS # BLD AUTO: 5 K/UL
NEUTROPHILS NFR BLD: 69.7 %
PLATELET # BLD AUTO: 204 K/UL
PMV BLD AUTO: 11.8 FL
RBC # BLD AUTO: 3.83 M/UL
WBC # BLD AUTO: 7.22 K/UL

## 2018-04-12 PROCEDURE — 99999 PR PBB SHADOW E&M-EST. PATIENT-LVL III: CPT | Mod: PBBFAC,,, | Performed by: OBSTETRICS & GYNECOLOGY

## 2018-04-12 PROCEDURE — 87491 CHLMYD TRACH DNA AMP PROBE: CPT

## 2018-04-12 PROCEDURE — 86592 SYPHILIS TEST NON-TREP QUAL: CPT

## 2018-04-12 PROCEDURE — 86703 HIV-1/HIV-2 1 RESULT ANTBDY: CPT

## 2018-04-12 PROCEDURE — 87081 CULTURE SCREEN ONLY: CPT

## 2018-04-12 PROCEDURE — 99213 OFFICE O/P EST LOW 20 MIN: CPT | Mod: TH,S$PBB,, | Performed by: OBSTETRICS & GYNECOLOGY

## 2018-04-12 PROCEDURE — 85025 COMPLETE CBC W/AUTO DIFF WBC: CPT

## 2018-04-12 PROCEDURE — 99213 OFFICE O/P EST LOW 20 MIN: CPT | Mod: PBBFAC,TH,PO | Performed by: OBSTETRICS & GYNECOLOGY

## 2018-04-12 PROCEDURE — 36415 COLL VENOUS BLD VENIPUNCTURE: CPT

## 2018-04-12 NOTE — PROGRESS NOTES
Good fetal movement. Reports LE swelling. Using compression stockings. Reports tingling sensation in her hands.  Growth scan on 2018 is at 51%      27 yo  female @ 35.5 wks by first trimester sono (EDC  who presents for f/u OB visit.     AP:  1) SIUP (it's a boy)  -anatomy with MFM  -Rh positive  -genetic testing: negative  -consents for vag/blood signed 2018  -unsure of circ but consents signed 2018  -1 hr GTT wnl     2) h/o SAB     3) obesity     4) PP  Breastfeeding  Contraception: OCPS    F/u in 2 wks OB visit    margarito ordaz MD

## 2018-04-12 NOTE — MEDICAL/APP STUDENT
Good fetal movement.     Reports LE swelling and UE tingling. Reassured that this was very normal towards the end of pregnancy. OE, bilateral pitting edema observed. No erythema or warmth to touch. ROS otherwise negative.    Growth scan on 2018 is at 51%. Fundal height today is 42cm. FHR 140s.     Charlie Mcginnis contractions. No VB/D, No LOF.     25 yo  female @ 35.5 wks by first trimester sono (EDC  who presents for f/u OB visit.     AP:  1) SIUP (it's a boy)  -anatomy with MFM  -Rh positive  -genetic testing: negative  -consents for vag/blood signed 2018  -unsure of circ but consents signed 2018  -1 hr GTT wnl     2) h/o SAB     3) obesity     4) PP  Breastfeeding  Contraception: OCPS     Today: 3rd trimester labs and GBS swab.     F/u in 1 wks growth scan  F/u in 2 wks OB visit    Sol Tellez [id number]

## 2018-04-13 LAB
C TRACH DNA SPEC QL NAA+PROBE: NOT DETECTED
HIV 1+2 AB+HIV1 P24 AG SERPL QL IA: NEGATIVE
N GONORRHOEA DNA SPEC QL NAA+PROBE: NOT DETECTED
RPR SER QL: NORMAL

## 2018-04-15 LAB — BACTERIA SPEC AEROBE CULT: NORMAL

## 2018-04-22 ENCOUNTER — PATIENT MESSAGE (OUTPATIENT)
Dept: OBSTETRICS AND GYNECOLOGY | Facility: CLINIC | Age: 28
End: 2018-04-22

## 2018-04-23 ENCOUNTER — HOSPITAL ENCOUNTER (EMERGENCY)
Facility: HOSPITAL | Age: 28
Discharge: HOME | End: 2018-04-23
Attending: EMERGENCY MEDICINE
Payer: MEDICAID

## 2018-04-23 VITALS
TEMPERATURE: 98 F | WEIGHT: 240 LBS | HEIGHT: 61 IN | BODY MASS INDEX: 45.31 KG/M2 | RESPIRATION RATE: 19 BRPM | OXYGEN SATURATION: 97 % | DIASTOLIC BLOOD PRESSURE: 83 MMHG | SYSTOLIC BLOOD PRESSURE: 124 MMHG | HEART RATE: 106 BPM

## 2018-04-23 DIAGNOSIS — H66.92 LEFT OTITIS MEDIA, UNSPECIFIED OTITIS MEDIA TYPE: Primary | ICD-10-CM

## 2018-04-23 DIAGNOSIS — J06.9 UPPER RESPIRATORY TRACT INFECTION, UNSPECIFIED TYPE: ICD-10-CM

## 2018-04-23 DIAGNOSIS — H10.32 ACUTE CONJUNCTIVITIS OF LEFT EYE, UNSPECIFIED ACUTE CONJUNCTIVITIS TYPE: ICD-10-CM

## 2018-04-23 LAB
BILIRUBIN, POC UA: NEGATIVE
BLOOD, POC UA: NEGATIVE
CLARITY, POC UA: CLEAR
COLOR, POC UA: YELLOW
GLUCOSE, POC UA: NEGATIVE
KETONES, POC UA: NEGATIVE
LEUKOCYTE EST, POC UA: NEGATIVE
NITRITE, POC UA: NEGATIVE
PH UR STRIP: 6.5 [PH]
PROTEIN, POC UA: NEGATIVE
SPECIFIC GRAVITY, POC UA: 1.02
UROBILINOGEN, POC UA: 0.2 E.U./DL

## 2018-04-23 PROCEDURE — 81003 URINALYSIS AUTO W/O SCOPE: CPT

## 2018-04-23 PROCEDURE — 99284 EMERGENCY DEPT VISIT MOD MDM: CPT

## 2018-04-23 RX ORDER — AMOXICILLIN AND CLAVULANATE POTASSIUM 875; 125 MG/1; MG/1
1 TABLET, FILM COATED ORAL 2 TIMES DAILY
Qty: 20 TABLET | Refills: 0 | Status: ON HOLD | OUTPATIENT
Start: 2018-04-23 | End: 2018-05-02 | Stop reason: HOSPADM

## 2018-04-23 RX ORDER — LORATADINE 10 MG/1
10 TABLET ORAL DAILY
Qty: 60 TABLET | Refills: 0 | Status: ON HOLD | OUTPATIENT
Start: 2018-04-23 | End: 2018-05-02 | Stop reason: HOSPADM

## 2018-04-23 RX ORDER — FLUTICASONE PROPIONATE 50 MCG
1 SPRAY, SUSPENSION (ML) NASAL DAILY
Qty: 1 BOTTLE | Refills: 0 | Status: SHIPPED | OUTPATIENT
Start: 2018-04-23 | End: 2018-06-04

## 2018-04-23 RX ORDER — DEXTROMETHORPHAN HYDROBROMIDE, GUAIFENESIN 5; 100 MG/5ML; MG/5ML
650 LIQUID ORAL EVERY 8 HOURS
Qty: 30 TABLET | Refills: 0 | Status: SHIPPED | OUTPATIENT
Start: 2018-04-23 | End: 2018-06-04

## 2018-04-23 NOTE — DISCHARGE INSTRUCTIONS
Follow-up with OB/GYN in one day.  Follow up with her primary care physician in one day.  Take Sudafed as directed by your OB for cough.  Take antibiotics until finished

## 2018-04-23 NOTE — ED PROVIDER NOTES
Encounter Date: 2018       History     Chief Complaint   Patient presents with    Cough     cough x 1 week and fever, 37 weeks pregnant     Brannon Lei is a 27 y.o. female who presents to the Emergency Department with  cough, congestion, and left ear pain.  Symptoms been going on for one week.  This morning patient woke up in her left eye looks red as well.  Patient denies eye pain or visual changes.  Patient states she had a temperature of 99 at home.  Patient states on Wednesday she had a low-grade fever to Tylenol felt better.  Patient reports normal fetal movement.  Patient had sent a message to her OB/GYN yesterday morning did not get a response so came to the ED.  , 37 weeks 1 day gestation      The history is provided by a parent.   URI   The primary symptoms include fever (low grade 99.9 and 100.0), ear pain and cough. Primary symptoms do not include sore throat, wheezing, abdominal pain, nausea, vomiting, myalgias, arthralgias or rash. The current episode started several days ago. This is a new problem. The problem has been gradually worsening. The fever began several days ago. The fever has been resolved since its onset.     Review of patient's allergies indicates:  No Known Allergies  Past Medical History:   Diagnosis Date    Migraines      History reviewed. No pertinent surgical history.  History reviewed. No pertinent family history.  Social History   Substance Use Topics    Smoking status: Never Smoker    Smokeless tobacco: Not on file    Alcohol use No     Review of Systems   Constitutional: Positive for fever (low grade 99.9 and 100.0).   HENT: Positive for ear pain. Negative for sore throat.    Respiratory: Positive for cough. Negative for shortness of breath and wheezing.    Cardiovascular: Negative for chest pain.   Gastrointestinal: Negative for abdominal pain, nausea and vomiting.   Genitourinary: Negative for dysuria.   Musculoskeletal: Negative for arthralgias, back pain  and myalgias.   Skin: Negative for rash.   Neurological: Negative for weakness.   Hematological: Does not bruise/bleed easily.   All other systems reviewed and are negative.      Physical Exam     Initial Vitals [04/23/18 0744]   BP Pulse Resp Temp SpO2   124/83 106 19 98 °F (36.7 °C) 97 %      MAP       96.67         Physical Exam    Nursing note and vitals reviewed.  Constitutional: She appears well-developed and well-nourished.   HENT:   Head: Normocephalic and atraumatic.   Right Ear: Tympanic membrane and external ear normal. No mastoid tenderness.   Left Ear: External ear normal. There is tenderness. No mastoid tenderness. Tympanic membrane is erythematous.   Mouth/Throat: Uvula is midline and mucous membranes are normal. Posterior oropharyngeal erythema present. No oropharyngeal exudate or posterior oropharyngeal edema.   Eyes: EOM and lids are normal. Pupils are equal, round, and reactive to light. Right eye exhibits no discharge. Left eye exhibits no discharge. Right conjunctiva is not injected. Right conjunctiva has no hemorrhage. Left conjunctiva is injected. Left conjunctiva has no hemorrhage.   Neck: Normal range of motion. Neck supple.   Cardiovascular: Normal rate, regular rhythm, normal heart sounds and intact distal pulses. Exam reveals no gallop and no friction rub.    No murmur heard.  Pulmonary/Chest: Breath sounds normal. No respiratory distress. She has no wheezes. She has no rhonchi. She has no rales. She exhibits no tenderness.   Abdominal: Soft. Bowel sounds are normal. She exhibits distension (gravid abdomen). She exhibits no mass. There is no tenderness. There is no rebound and no guarding.   Musculoskeletal: Normal range of motion. She exhibits no edema or tenderness.   Lymphadenopathy:     She has no cervical adenopathy.   Neurological: She is alert and oriented to person, place, and time. She has normal strength and normal reflexes. She displays normal reflexes. No cranial nerve deficit  or sensory deficit.   Skin: Skin is warm and dry. Capillary refill takes less than 2 seconds. No rash noted.   Psychiatric: She has a normal mood and affect.         ED Course   Procedures  Labs Reviewed   POCT URINALYSIS W/O SCOPE - Abnormal; Notable for the following:        Result Value    Glucose, UA Negative (*)     Bilirubin, UA Negative (*)     Ketones, UA Negative (*)     Blood, UA Negative (*)     Protein, UA Negative (*)     Nitrite, UA Negative (*)     Leukocytes, UA Negative (*)     All other components within normal limits   POCT URINALYSIS W/O SCOPE                             Medical decision making   Chief complaint: cough, congestion, and left ear pain.  Symptoms been going on for one week.  This morning patient woke up in her left eye looks red as well.    Differential diagnosis: Conjunctivitis, URI, pharyngitis, otitis media, otitis externa, and urinary tract infection  Treatment in the ED Physical Exam, and assess fetal heart tones.  Discussed outpatient treatment plan and OB follow-up   Fill and take prescriptions for Augmentin, Flonase, Tylenol, and Claritin as directed.  Return to the ED if symptoms worsen or do not resolve.   Answered questions and discussed discharge plan.    Patient feels much better and is ready for discharge.  Follow up with PCP in 1 days.       Clinical Impression:   The primary encounter diagnosis was Left otitis media, unspecified otitis media type. Diagnoses of Upper respiratory tract infection, unspecified type and Acute conjunctivitis of left eye, unspecified acute conjunctivitis type were also pertinent to this visit.                           Pita Wilson DO  04/23/18 0901

## 2018-04-23 NOTE — ED NOTES
Neuro: AAOx3  Resp: Airway patent, respirations even/unlabored  Cardiac: Skin pink/warm/dry, pulses intact  Abdomen: Soft, non-tender to palpation, denies N/V/D  Musculoskeletal: Moves all extremities equally, ROM intact  Cough x's 1 week

## 2018-04-26 ENCOUNTER — ROUTINE PRENATAL (OUTPATIENT)
Dept: OBSTETRICS AND GYNECOLOGY | Facility: CLINIC | Age: 28
End: 2018-04-26
Payer: MEDICAID

## 2018-04-26 VITALS
SYSTOLIC BLOOD PRESSURE: 120 MMHG | BODY MASS INDEX: 46.95 KG/M2 | DIASTOLIC BLOOD PRESSURE: 70 MMHG | WEIGHT: 248.44 LBS

## 2018-04-26 DIAGNOSIS — Z3A.37 37 WEEKS GESTATION OF PREGNANCY: ICD-10-CM

## 2018-04-26 DIAGNOSIS — Z34.03 ENCOUNTER FOR SUPERVISION OF NORMAL FIRST PREGNANCY IN THIRD TRIMESTER: Primary | ICD-10-CM

## 2018-04-26 PROCEDURE — 99213 OFFICE O/P EST LOW 20 MIN: CPT | Mod: TH,S$PBB,, | Performed by: OBSTETRICS & GYNECOLOGY

## 2018-04-26 PROCEDURE — 99999 PR PBB SHADOW E&M-EST. PATIENT-LVL I: CPT | Mod: PBBFAC,,, | Performed by: OBSTETRICS & GYNECOLOGY

## 2018-04-26 PROCEDURE — 99211 OFF/OP EST MAY X REQ PHY/QHP: CPT | Mod: PBBFAC,TH,PO | Performed by: OBSTETRICS & GYNECOLOGY

## 2018-04-26 RX ORDER — IBUPROFEN 800 MG/1
TABLET ORAL
COMMUNITY
Start: 2018-04-13 | End: 2018-10-10 | Stop reason: SDUPTHER

## 2018-04-26 NOTE — PROGRESS NOTES
Good fetal movement. Positive roberto carlos cohen ctxs.  Cervix 1.5cm dilated/thick/high    25 yo  female @ 37.5 wks by first trimester sono (EDC  who presents for f/u OB visit.     AP:  1) SIUP (it's a boy)  -anatomy with MFM  -Rh positive  -genetic testing: negative  -consents for vag/blood signed 2018  -unsure of circ but consents signed 2018  -1 hr GTT wnl  -GBS neg     2) h/o SAB     3) obesity     4) PP  Breastfeeding  Contraception: OCPS    F/u in 1 wks OB visit    margarito ordaz MD

## 2018-04-30 ENCOUNTER — HOSPITAL ENCOUNTER (INPATIENT)
Facility: HOSPITAL | Age: 28
LOS: 2 days | Discharge: HOME | End: 2018-05-02
Attending: OBSTETRICS & GYNECOLOGY | Admitting: OBSTETRICS & GYNECOLOGY
Payer: MEDICAID

## 2018-04-30 ENCOUNTER — ANESTHESIA (OUTPATIENT)
Dept: OBSTETRICS AND GYNECOLOGY | Facility: HOSPITAL | Age: 28
End: 2018-04-30
Payer: MEDICAID

## 2018-04-30 ENCOUNTER — ANESTHESIA EVENT (OUTPATIENT)
Dept: OBSTETRICS AND GYNECOLOGY | Facility: HOSPITAL | Age: 28
End: 2018-04-30
Payer: MEDICAID

## 2018-04-30 DIAGNOSIS — O42.90 PREMATURE RUPTURE OF MEMBRANES: ICD-10-CM

## 2018-04-30 LAB
ABO + RH BLD: NORMAL
BASOPHILS # BLD AUTO: 0.02 K/UL
BASOPHILS NFR BLD: 0.2 %
BLD GP AB SCN CELLS X3 SERPL QL: NORMAL
DIFFERENTIAL METHOD: ABNORMAL
EOSINOPHIL # BLD AUTO: 0.1 K/UL
EOSINOPHIL NFR BLD: 0.5 %
ERYTHROCYTE [DISTWIDTH] IN BLOOD BY AUTOMATED COUNT: 13.9 %
HCT VFR BLD AUTO: 33.5 %
HGB BLD-MCNC: 11.4 G/DL
LYMPHOCYTES # BLD AUTO: 3.1 K/UL
LYMPHOCYTES NFR BLD: 28.4 %
MCH RBC QN AUTO: 28.4 PG
MCHC RBC AUTO-ENTMCNC: 34 G/DL
MCV RBC AUTO: 83 FL
MONOCYTES # BLD AUTO: 0.9 K/UL
MONOCYTES NFR BLD: 8.1 %
NEUTROPHILS # BLD AUTO: 6.8 K/UL
NEUTROPHILS NFR BLD: 62.4 %
PLATELET # BLD AUTO: 207 K/UL
PMV BLD AUTO: 12 FL
RBC # BLD AUTO: 4.02 M/UL
WBC # BLD AUTO: 10.91 K/UL

## 2018-04-30 PROCEDURE — 72100002 HC LABOR CARE, 1ST 8 HOURS

## 2018-04-30 PROCEDURE — 63600175 PHARM REV CODE 636 W HCPCS: Performed by: OBSTETRICS & GYNECOLOGY

## 2018-04-30 PROCEDURE — 99211 OFF/OP EST MAY X REQ PHY/QHP: CPT

## 2018-04-30 PROCEDURE — 25000003 PHARM REV CODE 250: Performed by: OBSTETRICS & GYNECOLOGY

## 2018-04-30 PROCEDURE — 0UQGXZZ REPAIR VAGINA, EXTERNAL APPROACH: ICD-10-PCS | Performed by: OBSTETRICS & GYNECOLOGY

## 2018-04-30 PROCEDURE — 51702 INSERT TEMP BLADDER CATH: CPT

## 2018-04-30 PROCEDURE — 27800516 HC TRAY, EPIDURAL COMBO: Performed by: STUDENT IN AN ORGANIZED HEALTH CARE EDUCATION/TRAINING PROGRAM

## 2018-04-30 PROCEDURE — 25000003 PHARM REV CODE 250: Performed by: STUDENT IN AN ORGANIZED HEALTH CARE EDUCATION/TRAINING PROGRAM

## 2018-04-30 PROCEDURE — 59409 OBSTETRICAL CARE: CPT | Mod: AT,,, | Performed by: OBSTETRICS & GYNECOLOGY

## 2018-04-30 PROCEDURE — 11000001 HC ACUTE MED/SURG PRIVATE ROOM

## 2018-04-30 PROCEDURE — 86901 BLOOD TYPING SEROLOGIC RH(D): CPT

## 2018-04-30 PROCEDURE — 85025 COMPLETE CBC W/AUTO DIFF WBC: CPT

## 2018-04-30 PROCEDURE — 72200005 HC VAGINAL DELIVERY LEVEL II

## 2018-04-30 PROCEDURE — 27200710 HC EPIDURAL INFUSION PUMP SET: Performed by: STUDENT IN AN ORGANIZED HEALTH CARE EDUCATION/TRAINING PROGRAM

## 2018-04-30 PROCEDURE — 62326 NJX INTERLAMINAR LMBR/SAC: CPT | Performed by: ANESTHESIOLOGY

## 2018-04-30 RX ORDER — SIMETHICONE 80 MG
1 TABLET,CHEWABLE ORAL EVERY 6 HOURS PRN
Status: DISCONTINUED | OUTPATIENT
Start: 2018-04-30 | End: 2018-05-02 | Stop reason: HOSPADM

## 2018-04-30 RX ORDER — NALOXONE HCL 0.4 MG/ML
0.02 VIAL (ML) INJECTION
Status: DISCONTINUED | OUTPATIENT
Start: 2018-04-30 | End: 2018-04-30

## 2018-04-30 RX ORDER — IBUPROFEN 600 MG/1
600 TABLET ORAL EVERY 6 HOURS
Status: DISCONTINUED | OUTPATIENT
Start: 2018-04-30 | End: 2018-05-01

## 2018-04-30 RX ORDER — OXYTOCIN/RINGER'S LACTATE 20/1000 ML
2 PLASTIC BAG, INJECTION (ML) INTRAVENOUS
Status: DISCONTINUED | OUTPATIENT
Start: 2018-04-30 | End: 2018-04-30

## 2018-04-30 RX ORDER — SODIUM CHLORIDE 9 MG/ML
INJECTION, SOLUTION INTRAVENOUS
Status: DISCONTINUED | OUTPATIENT
Start: 2018-04-30 | End: 2018-04-30

## 2018-04-30 RX ORDER — MISOPROSTOL 200 UG/1
600 TABLET ORAL
Status: DISCONTINUED | OUTPATIENT
Start: 2018-04-30 | End: 2018-04-30

## 2018-04-30 RX ORDER — BUTORPHANOL TARTRATE 1 MG/ML
2 INJECTION INTRAMUSCULAR; INTRAVENOUS
Status: DISCONTINUED | OUTPATIENT
Start: 2018-04-30 | End: 2018-04-30

## 2018-04-30 RX ORDER — ONDANSETRON 8 MG/1
8 TABLET, ORALLY DISINTEGRATING ORAL EVERY 8 HOURS PRN
Status: DISCONTINUED | OUTPATIENT
Start: 2018-04-30 | End: 2018-05-02 | Stop reason: HOSPADM

## 2018-04-30 RX ORDER — ROPIVACAINE HYDROCHLORIDE 2 MG/ML
INJECTION, SOLUTION EPIDURAL; INFILTRATION; PERINEURAL
Status: DISPENSED
Start: 2018-04-30 | End: 2018-04-30

## 2018-04-30 RX ORDER — ACETAMINOPHEN 325 MG/1
650 TABLET ORAL EVERY 6 HOURS PRN
Status: DISCONTINUED | OUTPATIENT
Start: 2018-04-30 | End: 2018-05-01

## 2018-04-30 RX ORDER — ONDANSETRON 8 MG/1
8 TABLET, ORALLY DISINTEGRATING ORAL EVERY 8 HOURS PRN
Status: DISCONTINUED | OUTPATIENT
Start: 2018-04-30 | End: 2018-04-30

## 2018-04-30 RX ORDER — ACETAMINOPHEN 325 MG/1
650 TABLET ORAL EVERY 6 HOURS PRN
Status: DISCONTINUED | OUTPATIENT
Start: 2018-04-30 | End: 2018-05-02 | Stop reason: HOSPADM

## 2018-04-30 RX ORDER — DIPHENHYDRAMINE HYDROCHLORIDE 50 MG/ML
25 INJECTION INTRAMUSCULAR; INTRAVENOUS EVERY 4 HOURS PRN
Status: DISCONTINUED | OUTPATIENT
Start: 2018-04-30 | End: 2018-05-02 | Stop reason: HOSPADM

## 2018-04-30 RX ORDER — SODIUM CHLORIDE, SODIUM LACTATE, POTASSIUM CHLORIDE, CALCIUM CHLORIDE 600; 310; 30; 20 MG/100ML; MG/100ML; MG/100ML; MG/100ML
INJECTION, SOLUTION INTRAVENOUS
Status: DISCONTINUED | OUTPATIENT
Start: 2018-04-30 | End: 2018-04-30

## 2018-04-30 RX ORDER — OXYTOCIN/RINGER'S LACTATE 20/1000 ML
41.65 PLASTIC BAG, INJECTION (ML) INTRAVENOUS CONTINUOUS
Status: ACTIVE | OUTPATIENT
Start: 2018-04-30 | End: 2018-04-30

## 2018-04-30 RX ORDER — OXYCODONE AND ACETAMINOPHEN 10; 325 MG/1; MG/1
1 TABLET ORAL EVERY 4 HOURS PRN
Status: DISCONTINUED | OUTPATIENT
Start: 2018-04-30 | End: 2018-05-02 | Stop reason: HOSPADM

## 2018-04-30 RX ORDER — FENTANYL CITRATE 50 UG/ML
INJECTION, SOLUTION INTRAMUSCULAR; INTRAVENOUS
Status: DISPENSED
Start: 2018-04-30 | End: 2018-04-30

## 2018-04-30 RX ORDER — OXYCODONE AND ACETAMINOPHEN 5; 325 MG/1; MG/1
1 TABLET ORAL EVERY 4 HOURS PRN
Status: DISCONTINUED | OUTPATIENT
Start: 2018-04-30 | End: 2018-05-02 | Stop reason: HOSPADM

## 2018-04-30 RX ADMIN — OXYCODONE HYDROCHLORIDE AND ACETAMINOPHEN 1 TABLET: 5; 325 TABLET ORAL at 01:04

## 2018-04-30 RX ADMIN — Medication 12 ML/HR: at 06:04

## 2018-04-30 RX ADMIN — Medication 41.65 MILLI-UNITS/MIN: at 09:04

## 2018-04-30 RX ADMIN — OXYCODONE HYDROCHLORIDE AND ACETAMINOPHEN 1 TABLET: 5; 325 TABLET ORAL at 08:04

## 2018-04-30 RX ADMIN — IBUPROFEN 600 MG: 600 TABLET, FILM COATED ORAL at 10:04

## 2018-04-30 RX ADMIN — IBUPROFEN 600 MG: 600 TABLET, FILM COATED ORAL at 11:04

## 2018-04-30 RX ADMIN — IBUPROFEN 600 MG: 600 TABLET, FILM COATED ORAL at 05:04

## 2018-04-30 RX ADMIN — SODIUM CHLORIDE, SODIUM LACTATE, POTASSIUM CHLORIDE, AND CALCIUM CHLORIDE 1000 ML: .6; .31; .03; .02 INJECTION, SOLUTION INTRAVENOUS at 06:04

## 2018-04-30 RX ADMIN — SODIUM CHLORIDE, SODIUM LACTATE, POTASSIUM CHLORIDE, AND CALCIUM CHLORIDE: .6; .31; .03; .02 INJECTION, SOLUTION INTRAVENOUS at 06:04

## 2018-04-30 RX ADMIN — BENZOCAINE AND LEVOMENTHOL: 200; 5 SPRAY TOPICAL at 10:04

## 2018-04-30 NOTE — PLAN OF CARE
Problem: Patient Care Overview  Goal: Plan of Care Review  Outcome: Ongoing (interventions implemented as appropriate)  Discussed plan of care with pt and spouse;procedure teaching done; skin to skin and exclusive breastfeeding encouraged; diet will advance as tolerated; currently tolerating clears without nausea;  pain well controlled with spinal, spouse/family at bedside and attentive; pt plans to exclusively breastfeeding after benefits discussed;pt afebrile ; pt instructed on call light use and not to get up unassisted. Pt and spouse verbalized understanding.Labor progressing ; pt 6/90/-1 at change of shift; call light within reach; pt instructed to call nurse for increasing pressure.

## 2018-04-30 NOTE — LACTATION NOTE
04/30/18 1520   Infant Information   Infant's Name Guzman   Infant's Medical Care Provider Abraham   Maternal Infant Assessment   Breast Density Bilateral:;soft  (per pt.)   Nipple(s) Bilateral:;everted  (per pt.)   Nipple Symptoms bilateral:;other (see comments)  (denies redness/tenderness to nipples)   Infant Assessment   Mouth Size average   Pain/Comfort Assessments   Pain Assessment Performed Yes       Number Scale   Presence of Pain denies  (denies pain to nipples or breast)   Location - Side Bilateral   Location nipple(s)   Pain Rating: Rest 0   Pain Rating: Activity 0   Maternal Infant Feeding   Maternal Preparation breast care;hand hygiene   Maternal Emotional State relaxed   Infant Positioning (mom holding baby skin to skin,baby sleeping)   Presence of Pain no   Time Spent (min) 15-30 min   Latch Assistance no  (mom told to call for assistance if needed w/ BF)   Breastfeeding Education adequate infant intake;adequate milk volume;importance of skin-to-skin contact;increasing milk supply;other (see comments)  (s/d,s2s,fdg.freq/yared,I&O,nipp care,adeq of col,etc.)   Breastfeeding History   Breastfeeding History no  (first baby)   Feeding Infant   Satiety Cues sleeping after feeding   Lactation Referrals   Lactation Consult Initial assessment;Knowledge deficit   Lactation Interventions   Attachment Promotion skin-to-skin contact encouraged;counseling provided;environment adjusted;face-to-face positioning promoted;family involvement promoted;infant-mother separation minimized;privacy provided;role responsibility promoted;rooming-in promoted   Breastfeeding Assistance support offered;feeding on demand promoted;feeding cue recognition promoted   Maternal Breastfeeding Support diary/feeding log utilized;encouragement offered;infant-mother separation minimized;lactation counseling provided

## 2018-04-30 NOTE — L&D DELIVERY NOTE
Operative Note    Date: 2018  Time: 8:58 AM  Preoperative Diagnosis: IUP @ 38+ weeks, active labor, SROM  Postoperative Diagnosis:  same  Procedure: s/p   Type of Anesthesia: epidural  Surgeon: Kaila Cuevas MD  Specimen/Tissue Removed: intact 3 vessel cord placenta  Estimated Blood Loss: 300 cc  Complications: none   Findings: viable male  infant in cephalic presentation with APGARS 8 and 9; Head delivered PATEL, one tight nuchal cord doubly clamped then cut by Dr Cuevas.  Terminal mec noted;  handed to awaiting nurse. Intact 3 vessel cord placenta delivered with gentle manual traction. Uterine fundus massaged and noted to be firm.  Perineum evaluated and bilateral sulcus tears appreciated and repaired with chromic suture. Hemostasis achieved. Mother and  baby healthy, doing well.      GILBERT Cuevas MD

## 2018-04-30 NOTE — NURSING
([id number]) - Spoke with Dr. Cuevas, update given to her on patients labor and ROM status, admit orders received - read back and verified.

## 2018-04-30 NOTE — H&P
HPI:   Brannon Lei is a 27 y.o. female  at 38.2 wks EGA who presents here in active labor. Reports SROM at 310am. Clear. Patient was admitted and noted to be 3cm dilated. She advanced quickly and is now 10cm dilated.    ROS:  GENERAL: Denies weight gain or weight loss. Feeling well overall.   SKIN: Denies rash or lesions.   HEAD: Denies head injury or headache.   CHEST: Denies chest pain or shortness of breath.   CARDIOVASCULAR: Denies palpitations or left sided chest pain.   ABDOMEN: No abdominal pain, constipation, diarrhea, nausea, vomiting or rectal bleeding.   URINARY: No frequency, dysuria, hematuria, or burning on urination.  REPRODUCTIVE: See HPI.     Past Medical History:   Diagnosis Date    Migraines      No previous surgery  No significant PMH    Allergies: Patient has no known allergies.       PE:   Temp:  [97.5 °F (36.4 °C)]   Pulse:  [83-96]   BP: (133-147)/(88-98)   SpO2:  [98 %-100 %]   APPEARANCE: Well nourished, well developed, in no acute distress. Comfortable s/p epidural  CHEST: Lungs clear to auscultation.  HEART: Regular rate and rhythm, no murmurs, rubs or gallops.  SVE: 10/100/c/0  FHT: 130bpm, mod teresa, +accels, no decels  [place]: irregular    Lab Results   Component Value Date    WBC 10.91 2018    HGB 11.4 (L) 2018    HCT 33.5 (L) 2018    MCV 83 2018     2018       A POS    Assessment:  IUP @ 38.2 weeks, SROM, labor    Plan:   Admit to L&D  FHT Cat 1  GBS neg  Plan: admit, expectant management  Consents reviewed    Anticipate vaginal delivery    KEON Cuevas MD

## 2018-04-30 NOTE — PLAN OF CARE
Problem: Breastfeeding (Adult,Obstetrics,Pediatric)  Goal: Signs and Symptoms of Listed Potential Problems Will be Absent, Minimized or Managed (Breastfeeding)  Signs and symptoms of listed potential problems will be absent, minimized or managed by discharge/transition of care (reference Breastfeeding (Adult,Obstetrics,Pediatric) CPG).   Outcome: Ongoing (interventions implemented as appropriate)  Mother will breastfeed 8 or more times in 24 hours and on cue.  She will do lots of skin to skin before feedings and between feedings.  She will monitor baby for signs of an adequate feeding.  She will follow up with pediatrician as instructed.  She will call [facility] for any needs or concerns.

## 2018-04-30 NOTE — ANESTHESIA PREPROCEDURE EVALUATION
04/30/2018  Brannon Lei is a 27 y.o., female. Pregnant with first child, would like epidural.    Anesthesia Evaluation    I have reviewed the Patient Summary Reports.    I have reviewed the Nursing Notes.      Review of Systems  Anesthesia Hx:  No problems with previous Anesthesia    Social:  Non-Smoker, No Alcohol Use    Hematology/Oncology:     Oncology Normal    -- Anemia:   EENT/Dental:EENT/Dental Normal   Cardiovascular:  Cardiovascular Normal Exercise tolerance: good     Pulmonary:  Pulmonary Normal    Renal/:  Renal/ Normal     Hepatic/GI:  Hepatic/GI Normal    Musculoskeletal:  Musculoskeletal Normal    Neurological:  Neurology Normal    Endocrine:  Endocrine Normal    Dermatological:  Skin Normal    Psych:  Psychiatric Normal           Physical Exam  General:  Well nourished, Obesity    Airway/Jaw/Neck:  Airway Findings: Mallampati: III Improves to II with phonation.  TM Distance: Normal, at least 6 cm        Eyes/Ears/Nose:  EYES/EARS/NOSE FINDINGS: Normal   Dental:  DENTAL FINDINGS: Normal   Chest/Lungs:  Chest/Lungs Findings: Clear to auscultation, Normal Respiratory Rate     Heart/Vascular:  Heart Findings: Rate: Normal     Abdomen:  Abdomen Findings: Normal    Musculoskeletal:  Musculoskeletal Findings: Normal   Skin:  Skin Findings: Normal    Mental Status:  Mental Status Findings:  Cooperative, Alert and Oriented         Anesthesia Plan  Type of Anesthesia, risks & benefits discussed:  Anesthesia Type:  CSE  Patient's Preference:   Intra-op Monitoring Plan: standard ASA monitors  Intra-op Monitoring Plan Comments:   Post Op Pain Control Plan:   Post Op Pain Control Plan Comments:   Induction:    Beta Blocker:  Patient is not currently on a Beta-Blocker (No further documentation required).       Informed Consent: Patient understands risks and agrees with Anesthesia plan.  Questions  answered. Anesthesia consent signed with patient.  ASA Score: 3     Day of Surgery Review of History & Physical:            Ready For Surgery From Anesthesia Perspective.     Vitals:    04/30/18 0622   BP:    Pulse: 94   Temp:          Recent Labs  Lab 04/30/18  0548   WBC 10.91   RBC 4.02   HGB 11.4*   HCT 33.5*      MCV 83   MCH 28.4   MCHC 34.0

## 2018-04-30 NOTE — ANESTHESIA PROCEDURE NOTES
CSE    Patient location during procedure: OB  Start time: 4/30/2018 6:28 AM  Timeout: 4/30/2018 6:27 AM  End time: 4/30/2018 6:38 AM  Staffing  Anesthesiologist: FELIPE LÓPEZ  Resident/CRNA: DOM RAMIREZ  Performed: resident/CRNA   Preanesthetic Checklist  Completed: patient identified, site marked, surgical consent, pre-op evaluation, timeout performed, IV checked, risks and benefits discussed and monitors and equipment checked  CSE  Patient position: sitting  Prep: ChloraPrep  Patient monitoring: heart rate, cardiac monitor, continuous pulse ox and frequent blood pressure checks  Approach: midline  Spinal Needle  Needle type: pencil-tip   Needle gauge: 25 G  Needle length: 5 in  Epidural Needle  Injection technique: KOURTNEY air  Needle type: Tuohy   Needle gauge: 17 G  Needle length: 3.5 in  Needle insertion depth: 9.5 cm  Location: L3-4  Needle localization: anatomical landmarks  Catheter  Catheter type: ArrayComm  Catheter size: 20 G  Catheter at skin depth: 14.5 cm  Test dose: lidocaine 1.5% with Epi 1-to-200,000 and negative  Additional Documentation: incremental injection, negative aspiration for CSF, negative aspiration for heme, no paresthesia on injection and negative test dose  Assessment  Sensory level: T7   Dermatomal levels determined by alcohol swab  Intrathecal Medications:  Bolus administered: 1.5 mL of 0.2 ropivacaine  administered: primary anesthetic and 3 mcg of  fentanyl  Epinephrine added: none

## 2018-04-30 NOTE — PLAN OF CARE
1045 - received report from Gerard BERRY      1100 - vss, nad, pain well controlled w/po pain meds, tolerating regular diet.  Pit #2 infusing, encouraged pt to continue po hydration.  Poc: pain management as needed, po hydration, ambulation encouraged, finish pit #2, continue to monitor.  Reviewed poc w/pt.  Pt verbalized understanding.     1330 - pt voided    1745 - assisted pt with breastfeeding for approx 40 mins.  Demonstrated hand expression and colostrum easily expressed.  Demonstrated and explained how to,  the importance of, and signs of a good latch.  Will continue to monitor and assist if needed.

## 2018-05-01 LAB
BASOPHILS # BLD AUTO: 0.01 K/UL
BASOPHILS NFR BLD: 0.1 %
DIFFERENTIAL METHOD: ABNORMAL
EOSINOPHIL # BLD AUTO: 0.1 K/UL
EOSINOPHIL NFR BLD: 0.7 %
ERYTHROCYTE [DISTWIDTH] IN BLOOD BY AUTOMATED COUNT: 14.4 %
HCT VFR BLD AUTO: 26 %
HGB BLD-MCNC: 8.6 G/DL
LYMPHOCYTES # BLD AUTO: 3.4 K/UL
LYMPHOCYTES NFR BLD: 29.3 %
MCH RBC QN AUTO: 28.3 PG
MCHC RBC AUTO-ENTMCNC: 33.1 G/DL
MCV RBC AUTO: 86 FL
MONOCYTES # BLD AUTO: 0.9 K/UL
MONOCYTES NFR BLD: 8 %
NEUTROPHILS # BLD AUTO: 7 K/UL
NEUTROPHILS NFR BLD: 61.6 %
PLATELET # BLD AUTO: 156 K/UL
PMV BLD AUTO: 11.5 FL
RBC # BLD AUTO: 3.04 M/UL
WBC # BLD AUTO: 11.42 K/UL

## 2018-05-01 PROCEDURE — 11000001 HC ACUTE MED/SURG PRIVATE ROOM

## 2018-05-01 PROCEDURE — 85025 COMPLETE CBC W/AUTO DIFF WBC: CPT

## 2018-05-01 PROCEDURE — 25000003 PHARM REV CODE 250: Performed by: OBSTETRICS & GYNECOLOGY

## 2018-05-01 PROCEDURE — 99231 SBSQ HOSP IP/OBS SF/LOW 25: CPT | Mod: ,,, | Performed by: OBSTETRICS & GYNECOLOGY

## 2018-05-01 PROCEDURE — 36415 COLL VENOUS BLD VENIPUNCTURE: CPT

## 2018-05-01 RX ORDER — IBUPROFEN 400 MG/1
800 TABLET ORAL EVERY 8 HOURS PRN
Status: DISCONTINUED | OUTPATIENT
Start: 2018-05-01 | End: 2018-05-02 | Stop reason: HOSPADM

## 2018-05-01 RX ADMIN — IBUPROFEN 800 MG: 400 TABLET, FILM COATED ORAL at 03:05

## 2018-05-01 RX ADMIN — IBUPROFEN 800 MG: 400 TABLET, FILM COATED ORAL at 09:05

## 2018-05-01 RX ADMIN — OXYCODONE HYDROCHLORIDE AND ACETAMINOPHEN 1 TABLET: 5; 325 TABLET ORAL at 06:05

## 2018-05-01 RX ADMIN — OXYCODONE HYDROCHLORIDE AND ACETAMINOPHEN 1 TABLET: 5; 325 TABLET ORAL at 10:05

## 2018-05-01 RX ADMIN — IBUPROFEN 600 MG: 600 TABLET, FILM COATED ORAL at 06:05

## 2018-05-01 NOTE — ANESTHESIA POSTPROCEDURE EVALUATION
"Anesthesia Post Evaluation    Patient: Brannon Lei    Procedure(s) Performed: * No procedures listed *      Patient location: mother-baby unit.  Patient participation: Yes- Able to Participate  Level of consciousness: awake and alert  Post-procedure vital signs: reviewed and stable  Pain management: adequate  Airway patency: patent  PONV status at discharge: No PONV  Anesthetic complications: no      Cardiovascular status: stable  Respiratory status: unassisted, spontaneous ventilation and room air  Hydration status: euvolemic  Follow-up not needed.  Comments: No catheter in back  No headache/neckache/backache  Full return of neurological function  Able to urinate  Advised patient to report any new problems of back pain, especially with fever or decreasing bladder function occurring during coming days to weeks          Visit Vitals  /81 (BP Location: Right arm, Patient Position: Lying)   Pulse 96   Temp 36.7 °C (98.1 °F) (Oral)   Resp 18   Ht 5' 1" (1.549 m)   Wt 112.9 kg (249 lb)   LMP 08/05/2017   SpO2 100%   Breastfeeding? Yes   BMI 47.05 kg/m²       Pain/Mariella Score: Pain Rating Prior to Med Admin: 6 (5/1/2018  6:29 AM)  Pain Rating Post Med Admin: 0 (5/1/2018  7:20 AM)      "

## 2018-05-01 NOTE — PROGRESS NOTES
PPD #1    S: patient doing well. Pain improved with PO pain meds. Tired. Baby up all night with feedings.    O  Temp:  [97.5 °F (36.4 °C)-98.3 °F (36.8 °C)]   Pulse:  []   Resp:  [16-18]   BP: (112-147)/(68-98)   SpO2:  [98 %-100 %]    Gen: a&ox3, nAD - breastfeeding now  Abd: soft, fundus firm below umbilicus, nontender  Ext: 1+ pitting edema    Lab Results   Component Value Date    WBC 11.42 2018    HGB 8.6 (L) 2018    HCT 26.0 (L) 2018    MCV 86 2018     2018     A POS    AP: 26 yo now  female s/p , doing well  Continue routine pp care  Male infant for circ prior to discharge home.      KEON Cuevas MD

## 2018-05-01 NOTE — LACTATION NOTE
05/01/18 0910   Infant Information   Infant's Name Guzman   Infant's Medical Care Provider Abraham   Maternal Infant Assessment   Breast Density Bilateral:;soft  (per pt.)   Nipple Symptoms (denies redness/tenderness to nipples)   Infant Assessment   Sucking Reflex present  (per pt.)   Rooting Reflex present  (per pt.)   Swallow Reflex present  (per pt.)   Breasts WDL   Breasts WDL WDL   Pain/Comfort Assessments   Pain Assessment Performed Yes       Number Scale   Presence of Pain denies  (denies pain to nipples when BF)   Location - Side Bilateral   Location nipple(s)   Pain Rating: Rest 0   Pain Rating: Activity 0   Maternal Infant Feeding   Maternal Preparation breast care;hand hygiene   Maternal Emotional State relaxed   Infant Positioning (baby in crib sleeping)   Presence of Pain no   Time Spent (min) 15-30 min   Latch Assistance no  (mom enc. to call for latch check w/ fdg.)   Engorgement Measures complete emptying encouraged;supportive bra encouraged   Breastfeeding Education adequate infant intake;adequate milk volume;importance of skin-to-skin contact;increasing milk supply;other (see comments)  (s/d,s2s,fdg.freq/yared,I&O,nipp care,etc.)   Breastfeeding History   Breastfeeding History no   Infant First Feeding   Skin-to-Skin Contact Offered but patient/parent declined   Feeding Infant   Satiety Cues sleeping after feeding   Audible Swallow yes  (per pt.)   Suck/Swallow Coordination present   Lactation Referrals   Lactation Consult Follow up;Knowledge deficit   Lactation Interventions   Attachment Promotion counseling provided;environment adjusted;face-to-face positioning promoted;family involvement promoted;infant-mother separation minimized;privacy provided;role responsibility promoted;rooming-in promoted;skin-to-skin contact encouraged   Breastfeeding Assistance support offered;feeding on demand promoted;feeding cue recognition promoted   Maternal Breastfeeding Support diary/feeding log  utilized;encouragement offered;infant-mother separation minimized;lactation counseling provided

## 2018-05-02 ENCOUNTER — TELEPHONE (OUTPATIENT)
Dept: OBSTETRICS AND GYNECOLOGY | Facility: CLINIC | Age: 28
End: 2018-05-02

## 2018-05-02 VITALS
WEIGHT: 249 LBS | OXYGEN SATURATION: 100 % | BODY MASS INDEX: 47.01 KG/M2 | HEART RATE: 85 BPM | RESPIRATION RATE: 20 BRPM | SYSTOLIC BLOOD PRESSURE: 135 MMHG | HEIGHT: 61 IN | TEMPERATURE: 98 F | DIASTOLIC BLOOD PRESSURE: 92 MMHG

## 2018-05-02 DIAGNOSIS — R52 POSTPARTUM PAIN: Primary | ICD-10-CM

## 2018-05-02 PROCEDURE — 99238 HOSP IP/OBS DSCHRG MGMT 30/<: CPT | Mod: ,,, | Performed by: OBSTETRICS & GYNECOLOGY

## 2018-05-02 RX ORDER — AMOXICILLIN 250 MG
1 CAPSULE ORAL DAILY
Status: DISCONTINUED | OUTPATIENT
Start: 2018-05-02 | End: 2018-05-02 | Stop reason: HOSPADM

## 2018-05-02 RX ORDER — IBUPROFEN 800 MG/1
800 TABLET ORAL EVERY 8 HOURS PRN
Qty: 30 TABLET | Refills: 2 | Status: SHIPPED | OUTPATIENT
Start: 2018-05-02 | End: 2018-06-04

## 2018-05-02 RX ORDER — OXYCODONE AND ACETAMINOPHEN 5; 325 MG/1; MG/1
1 TABLET ORAL EVERY 4 HOURS PRN
Qty: 20 TABLET | Refills: 0 | Status: SHIPPED | OUTPATIENT
Start: 2018-05-02 | End: 2018-06-04

## 2018-05-02 NOTE — TELEPHONE ENCOUNTER
Pt was discharged before being seen by lactation. No stools noted in last 24 hrs. Pt states she has an appointment with ped on Friday. Stressed importance of going to the appt especially for a weight check. Instructed pt to monitor strict I&O and to contact pediatrician to be seen tomorrow if baby is not feeding well and does not wake easily, has low output. Discussed if supplementation is needed to pump and provide expressed breast milk over formula. Informed pt on available resources to obtain a breast pump including WIC and Medicaid. DC teaching completed. Pt denies questions or needs.

## 2018-05-02 NOTE — PHYSICIAN QUERY
PT Name: Brannon Lei  MR #: [id number]     Physician Query Form - Documentation Clarification      CDS/: TAYLER Vyas,[email or web address]           Contact information:[email or web address]    This form is a permanent document in the medical record.     Query Date: May 2, 2018    By submitting this query, we are merely seeking further clarification of documentation. Please utilize your independent clinical judgment when addressing the question(s) below.    The Medical record reflects the following:    Supporting Clinical Findings Location in Medical Record   Brannon Lei is a 27 y.o. female  at 38.2 wks EGA who presents here in active labor. Reports SROM at 310am. Clear. Patient was admitted and noted to be 3cm dilated. She advanced quickly and is now 10cm dilated.    Assessment:  IUP @ 38.2 weeks, SROM, labor    Procedure: s/p     Final Active Diagnoses:  Premature rupture of membranes    H&P                   L&D Delivery note     Discharge Summaries                                                                                           Doctor, Please specify diagnosis or diagnoses associated with above clinical findings.    Provider Use Only      [  ] SROM occurring after the onset of labor  [  x] PROM with onset of labor within 24 hours  [  ] PROM with onset of labor after 24 hours  [  ] PROM, other or unspecified  [  ] Other, please specify:_________________________________________                                                                                                                   [  ] Clinically undetermined

## 2018-05-02 NOTE — PLAN OF CARE
"In patient's room, pt in tears and very upset. Pt reports, "my  fell at work and have to go to [facility] to have surgery". Expressed my sincerity and notified pt that discharge paper worked will be gathered quickly in order to go home.   0915- Back in pt room. Pt no longer crying and fully dressed. Said waiting for the medication to come from pharmacy.  0920- Called pharmacy, medication should be up on unit in the next 15 minutes.   0930- Urged the importance of calling Dr. Cuevas office and scheduling an appointment next week for blood pressure check or going to the emergency room if feel bad. Pt verbalized understanding. Educated on pre-eclamptic precautions and handout given.  1040- Dr. Cuevas called unit. Informed her of patient's  situation at discharge.  "

## 2018-05-02 NOTE — PHYSICIAN QUERY
"PT Name: Brannon Lei  MR #: [id number]    Physician Query Form - Hematology Clarification      CDS/: TAYLER Vyas,RNC-MNN           Contact information:[email or web address]    This form is a permanent document in the medical record.      Query Date: May 2, 2018    By submitting this query, we are merely seeking further clarification of documentation. Please utilize your independent clinical judgment when addressing the question(s) below.    The Medical record contains the following:   Indicators  Supporting Clinical Findings Location in Medical Record   X "Anemia" documented Anemia Anesthesia note    X H & H = Hgb=8.6-11.4  Hct=26.0-33.5 LAB -    BP =                     HR=      "GI bleeding" documented     X Acute bleeding (Non GI site) Estimated Blood Loss: 300 cc L&D Delivery note     Transfusion(s)      Treatment:     X Other:  Procedure: s/p  L&D Delivery note      Provider, please specify diagnosis or diagnoses associated with above clinical findings.    [ x ] Acute blood loss anemia  [  ] Other Hematological Diagnosis (please specify): _________________________________  [  ] Clinically Undetermined       Please document in your progress notes daily for the duration of treatment, until resolved, and include in your discharge summary.                                                                                                      "

## 2018-05-02 NOTE — DISCHARGE INSTRUCTIONS
"Patient Discharge Instructions for Postpartum Women    Resume Regular Diet  Increase activity gradually, no heavy lifting  Shower  No tampons, douching or sexual intercourse.  Discuss birth control options with your physician.  Wear a support bra  Return to work/school when you've been cleared by a physician    Call your physician if     *Fever of 100.4 or higher  *Persistent nausea/ vomiting  *Incisional drainage  *Heavy vaginal bleeding or large clots (Heavy bleeding is soaking 1 pad in an hour)  *Swelling and pain in arms or legs  *Severe headaches, blurred vision or fainting  *Shortness of breath  *Frequency and burning with urination  *Signs of postpartum depression, discuss these signs with your physician    Call lactation services for questions regarding feeding, nipple and breast care, and general questions about lactation.  They can be reached at [phone]         Understanding Postpartum Depression    You've just had a baby.  You know you should be excited and happy.  But instead you find yourself crying for no reason.  You may have trouble coping with your daily tasks.  You feel sad, tired, and hopeless most of the time.  You may even feel ashamed or guilty.  But what you're going through is not your fault and you can feel better.  Talk to your doctor.  He or she can help.    Depression After Childbirth    You may be weepy and tired right after giving birth.  These feelings are normal.  They're sometimes called the "baby blues."  These blues go away 2-3 weeks.  However, postpartum (meaning "after birth") depression lasts much longer and is more sever than the "baby blues."  It can make you feel sad and hopeless.  You may also fear that your baby will be harmed and worry about being a bad mother.      What is Depression?    Depression is a mood disorder that affects the way you think and feel.  The most common symptom is a feeling of deep sadness.  You may also feel as if you just can't cope with life.  "   Other symptoms include:      * Gaining or loosing weight  * Sleeping too much or too little  * Feeling tired all the time  * Feeling restless  * Fears of harming your baby   * Lack of interest in your baby  * Feeling worthless or guilty  * No longer finding pleasure in things you used to  * Having trouble thinking clearly or making decisions  * Thoughts of hurting yourself or your baby    What Causes Postpartum Depression    The exact causes of postpartum depression isn't known.  It may be due to changes in your hormones during and after childbirth.  You may also be tired from caring for your baby and adjusting to being a mother.  All these factors may make you feel depressed.  In some cases, your genes may also play a role.    Depression Can Be Treated    The good news is that there are many ways to treat postpartum depression.  Talking to your doctor is the first step toward feeling better.    Resources:    * National [place] of Mental Health  -- 539.954.2917    www.nimh.nih.gov    * National [place] on Mental Illness --988.217.5345    Www.yash.org    * Mental Health [place] -- 555.607.1099     Www.[facility].org    * National Suicide Hotline --801.391.3192 (800-SUICIDE)    0126-6127 [employer]  All rights reserved.  This information is not intended as a substitute for professional medical care.  Always follow up with your healthcare professional's instructions.      Breastfeeding Discharge Instructions       Feed the baby at the earliest sign of hunger or comfort  o Hands to mouth, sucking motions  o Rooting or searching for something to suck on  o Dont wait for crying - it is a sign of distress     The feedings may be 8-12 times per 24hrs and will not follow a schedule   Avoid pacifiers and bottles for the first 4 weeks   Alternate the breast you start the feeding with, or start with the breast that feels the fullest   Switch breasts when the baby takes himself off the breast or falls  asleep   Keep offering breasts until the baby looks full, no longer gives hunger signs, and stays asleep when placed on his back in the crib   If the baby is sleepy and wont wake for a feeding, put the baby skin-to-skin dressed in a diaper against the mothers bare chest   Sleep near your baby   The baby should be positioned and latched on to the breast correctly  o Chest-to-chest, chin in the breast  o Babys lips are flipped outward  o Babys mouth is stretched open wide like a shout  o Babys sucking should feel like tugging to the mother  - The baby should be drinking at the breast:  o You should hear swallowing or gulping throughout the feeding  o You should see milk on the babys lips when he comes off the breast  o Your breasts should be softer when the baby is finished feeding  o The baby should look relaxed at the end of feedings  o After the 4th day and your milk is in:  o The babys poop should turn bright yellow and be loose, watery, and seedy  o The baby should have at least 3-4 poops the size of the palm of your hand per day  o The baby should have at least 5-6 wet diapers per day  o The urine should be light yellow in color  You should drink when you are thirsty and eat a healthy diet when you are    hungry.     Take naps to get the rest you need.   Take medications and/or drink alcohol only with permission of your obstetrician    or the babys pediatrician.  You can also call the [facility] Risk [facility],   ([phone]), Monday-Friday, 8am-5pm Central time, to get the most   up-to-date evidence-based information on the use of medications during   pregnancy and breastfeeding.      The baby should be examined by a pediatrician at 3-5 days of age.  Once your   milk comes in, the baby should be gaining at least ½ - 1oz each day and should be back to birthweight no later than 10-14 days of age.          Community Resources    [facility] Breastfeeding Warmline: 123.675.6073  [facility]  clinics: provide incentives and breastpumps to eligible mothers  [employer] (LLLI):  mother-to-mother support group website        www.lll.org  [facility] La [employer]eche [employer]ary mother-to-mother support groups:        www.llivyMyAppConverter.[employer]        [employer] [facility]   Dr. Kimani Pate website for latch videos and general information:        www.breastfeedinginc.ca  [facility] is a call center that provides information about the safety of taking medications while breastfeeding.  Call 1-227.346.6878, M-F, 8am-5pm, CT.  International Lactation Consultant Association provides resources for assistance:        www.ilca.org  [facility] Breastfeeding Coalition provides informationand resources for parents  and the community    http://[facility].org     Orquidea Wright is a mom-to-mom support group:                             www.nosrinivasanMedical Connections.[employer]//breastfeedng-support/  Partners for Healthy Babies:  0-293-687-BABY(9012)  [place] [employer] [place]: a breastfeeding support group for women of color, 811.914.5363

## 2018-05-02 NOTE — PLAN OF CARE
Problem: Patient Care Overview  Goal: Plan of Care Review  Outcome: Ongoing (interventions implemented as appropriate)  Patient lying in bed, denies pain or discomfort. Currently feeding . Will continue to exclusively breastfeed  8 or more times per 24 hr period.

## 2018-05-02 NOTE — PHYSICIAN QUERY
"PT Name: Brannon Lei  MR #: [id number]     Physician Query Form - Documentation Clarification      CDS/: TAYLER Vyas,RNC-MNN          Contact information:[email or web address]    This form is a permanent document in the medical record.     Query Date: May 2, 2018    By submitting this query, we are merely seeking further clarification of documentation. Please utilize your independent clinical judgment when addressing the question(s) below.    The Medical record reflects the following:    Supporting Clinical Findings Location in Medical Record   General:  Well nourished, Obesity     BMI=47.1  Ht=5' 1" (1.549 m)  Qn=851.9 kg (249 lb)    Procedure: s/p  Anesthesia note       Anthropometrics         L&D Delivery note                                                                                       Doctor, Please specify diagnosis or diagnoses associated with above clinical findings.    Provider Use Only        [  ] Morbid obesity complicating childbirth  [  ] Other, please specify:___________________________________________                                                                                                                 [ x ] Clinically undetermined            "

## 2018-05-02 NOTE — PROGRESS NOTES
PPD #2    S: patient doing well. Pain improved with PO pain meds. Tired. Baby still eating a lot.    O  Temp:  [97.5 °F (36.4 °C)-98.3 °F (36.8 °C)]   Pulse:  []   Resp:  [16-20]   BP: (112-147)/(68-98)   SpO2:  [98 %-100 %]    Gen: a&ox3, nAD   Abd: soft, fundus firm below umbilicus, nontender  Ext: 1+ pitting edema    Lab Results   Component Value Date    WBC 11.42 2018    HGB 8.6 (L) 2018    HCT 26.0 (L) 2018    MCV 86 2018     2018     A POS    AP: 28 yo now  female s/p , doing well  Continue routine pp care  Male infants/p circ      KEON Cuevas MD

## 2018-05-02 NOTE — DISCHARGE SUMMARY
[facility]  Obstetrics  Discharge Summary      Patient Name: Brannon Lei  MRN: [id number]  Admission Date: 2018  Hospital Length of Stay: 2 days  Discharge Date and Time:  2018 7:10 AM  Attending Physician: Kaila Cuevas MD   Discharging Provider: Kaila Cuevas MD  Primary Care Provider: Primary Doctor No    HPI: 26 yo now  female admitted with SROM and active labor at 38+ weeks gestation.    Hospital Course: The patient's labor course was uncomplicated. She progressed quickly and is now s/p uncomplicated . Her postpartum course was also uncomplicated. On day of discharge, she was tolerating PO. Pain was controlled. She was ambulating, voiding, and passing flatus.    Final Active Diagnoses:    Diagnosis Date Noted POA    PRINCIPAL PROBLEM:   (normal spontaneous vaginal delivery) [O80] 2018 Not Applicable    Premature rupture of membranes [O42.90] 2018 Yes      Problems Resolved During this Admission:    Diagnosis Date Noted Date Resolved POA      Lab Results   Component Value Date    WBC 11.42 2018    HGB 8.6 (L) 2018    HCT 26.0 (L) 2018    MCV 86 2018     2018     A POS    Feeding Method: breast    Immunizations     Date Immunization Status Dose Route/Site Given by    18 0956 MMR Incomplete 0.5 mL Subcutaneous/Left deltoid     18 0956 Tdap Incomplete 0.5 mL Intramuscular/Left deltoid           Delivery:    Episiotomy: None   Lacerations: None   Repair suture:     Repair # of packets: 4   Blood loss (ml): 376     Birth information:  YOB: 2017   Time of birth: 7:42 AM   Sex: male   Delivery type: Vaginal, Spontaneous Delivery   Gestational Age: 38w2d    Delivery Clinician:      Other providers:       Additional  information:  Forceps:    Vacuum:    Breech:    Observed anomalies      Living?:           APGARS  One minute Five minutes Ten minutes   Skin color:         Heart rate:          Grimace:         Muscle tone:         Breathing:         Totals: 8  9        Placenta: Delivered:       appearance    Pending Diagnostic Studies:     None          Discharged Condition: good    Disposition: Home or Self Care    Follow Up:  Follow-up Information     Kaila Cuevas MD On 6/4/2018.    Specialties:  Obstetrics, Obstetrics and Gynecology  Why:  1115am for pp visit  Contact information:  [address]  [address]  [place] [address]  [phone]                 Patient Instructions:     Activity as tolerated       Medications:  Current Discharge Medication List      START taking these medications    Details   !! ibuprofen (ADVIL,MOTRIN) 800 MG tablet Take 1 tablet (800 mg total) by mouth every 8 (eight) hours as needed for Pain.  Qty: 30 tablet, Refills: 2    Associated Diagnoses: Postpartum pain      oxyCODONE-acetaminophen (PERCOCET) 5-325 mg per tablet Take 1 tablet by mouth every 4 (four) hours as needed.  Qty: 20 tablet, Refills: 0    Associated Diagnoses: Postpartum pain       !! - Potential duplicate medications found. Please discuss with provider.      CONTINUE these medications which have NOT CHANGED    Details   acetaminophen (TYLENOL 8 HOUR) 650 MG TbSR Take 1 tablet (650 mg total) by mouth every 8 (eight) hours.  Qty: 30 tablet, Refills: 0      ferrous gluconate (FERGON) 324 MG tablet Take 324 mg by mouth daily with breakfast.      fluticasone (FLONASE ALLERGY RELIEF) 50 mcg/actuation nasal spray 1 spray (50 mcg total) by Each Nare route once daily.  Qty: 1 Bottle, Refills: 0      !! ibuprofen (ADVIL,MOTRIN) 800 MG tablet       PRENATAL VIT/IRON FUM/FOLIC AC (PRENATAL 1+1 ORAL) Take by mouth.       !! - Potential duplicate medications found. Please discuss with provider.      STOP taking these medications       amoxicillin-clavulanate 875-125mg (AUGMENTIN) 875-125 mg per tablet Comments:   Reason for Stopping:         loratadine (CLARITIN) 10 mg tablet Comments:   Reason for  Stopping:               Kaila Cuevas MD  Obstetrics  [facility]

## 2018-05-04 ENCOUNTER — TELEPHONE (OUTPATIENT)
Dept: OBSTETRICS AND GYNECOLOGY | Facility: CLINIC | Age: 28
End: 2018-05-04

## 2018-05-04 NOTE — TELEPHONE ENCOUNTER
I spoke with the patient at the request of Dr. Cuevas and asked her how she and the baby were doing.  She said that she was fine.  I told her that we knew her  had been in an accident and she said he was ok.  I told her that if she needed anything or had questions to call us.  She said she would.

## 2018-05-05 ENCOUNTER — TELEPHONE (OUTPATIENT)
Dept: OBSTETRICS AND GYNECOLOGY | Facility: CLINIC | Age: 28
End: 2018-05-05

## 2018-06-04 ENCOUNTER — POSTPARTUM VISIT (OUTPATIENT)
Dept: OBSTETRICS AND GYNECOLOGY | Facility: CLINIC | Age: 28
End: 2018-06-04
Payer: MEDICAID

## 2018-06-04 ENCOUNTER — TELEPHONE (OUTPATIENT)
Dept: OBSTETRICS AND GYNECOLOGY | Facility: CLINIC | Age: 28
End: 2018-06-04

## 2018-06-04 VITALS
HEIGHT: 61 IN | DIASTOLIC BLOOD PRESSURE: 60 MMHG | WEIGHT: 219.81 LBS | SYSTOLIC BLOOD PRESSURE: 110 MMHG | BODY MASS INDEX: 41.5 KG/M2

## 2018-06-04 DIAGNOSIS — R30.0 DYSURIA: ICD-10-CM

## 2018-06-04 DIAGNOSIS — G44.019 EPISODIC CLUSTER HEADACHE, NOT INTRACTABLE: ICD-10-CM

## 2018-06-04 DIAGNOSIS — Z30.011 ENCOUNTER FOR INITIAL PRESCRIPTION OF CONTRACEPTIVE PILLS: ICD-10-CM

## 2018-06-04 PROCEDURE — 99213 OFFICE O/P EST LOW 20 MIN: CPT | Mod: PBBFAC,PO | Performed by: OBSTETRICS & GYNECOLOGY

## 2018-06-04 PROCEDURE — 87086 URINE CULTURE/COLONY COUNT: CPT

## 2018-06-04 PROCEDURE — 87088 URINE BACTERIA CULTURE: CPT

## 2018-06-04 PROCEDURE — 87147 CULTURE TYPE IMMUNOLOGIC: CPT

## 2018-06-04 PROCEDURE — 99999 PR PBB SHADOW E&M-EST. PATIENT-LVL III: CPT | Mod: PBBFAC,,, | Performed by: OBSTETRICS & GYNECOLOGY

## 2018-06-04 RX ORDER — BUTALBITAL, ACETAMINOPHEN AND CAFFEINE 50; 325; 40 MG/1; MG/1; MG/1
TABLET ORAL
COMMUNITY
End: 2018-06-04 | Stop reason: SDUPTHER

## 2018-06-04 RX ORDER — AZITHROMYCIN 250 MG/1
TABLET, FILM COATED ORAL
COMMUNITY
End: 2018-06-04

## 2018-06-04 RX ORDER — BUTALBITAL, ACETAMINOPHEN AND CAFFEINE 50; 325; 40 MG/1; MG/1; MG/1
TABLET ORAL
Qty: 30 TABLET | Refills: 0 | Status: SHIPPED | OUTPATIENT
Start: 2018-06-04 | End: 2018-06-04 | Stop reason: SDUPTHER

## 2018-06-04 RX ORDER — BUTALBITAL, ACETAMINOPHEN AND CAFFEINE 50; 325; 40 MG/1; MG/1; MG/1
1 TABLET ORAL EVERY 6 HOURS PRN
Qty: 30 TABLET | Refills: 0 | Status: SHIPPED | OUTPATIENT
Start: 2018-06-04

## 2018-06-04 RX ORDER — ACETAMINOPHEN AND CODEINE PHOSPHATE 120; 12 MG/5ML; MG/5ML
1 SOLUTION ORAL DAILY
Qty: 30 TABLET | Refills: 11 | Status: SHIPPED | OUTPATIENT
Start: 2018-06-04 | End: 2019-11-21

## 2018-06-04 RX ORDER — LORATADINE 10 MG/1
10 TABLET ORAL DAILY
Refills: 0 | COMMUNITY
Start: 2018-05-19 | End: 2018-06-04

## 2018-06-04 RX ORDER — CITALOPRAM 20 MG/1
TABLET, FILM COATED ORAL
COMMUNITY
End: 2018-06-04

## 2018-06-04 NOTE — PROGRESS NOTES
"  The patient presents for her postpartum visit. Reports slight burning with urination.   Denies fever, chills. She denies pain with urination.  Denies diarrhea. Denies constipation.     Type of Delivery:  She had   Delivery Date: 18  Delivery MD: neil cuevas  Gender: Male  Baby Name: Guzman  Breast Feeding and bottlefeeding  Vaginal Bleeding: none  Incontinence: no  Depression: no  Protection yet: no  Contraception discussed and patient desires OCP      ROS: negative    PE:   Vitals: /60   Ht 5' 1" (1.549 m)   Wt 99.7 kg (219 lb 12.8 oz)   LMP 2017   Breastfeeding? Yes   BMI 41.53 kg/m²   APPEARANCE: Well nourished, well developed, in no acute distress.  ABDOMEN: Soft. No tenderness or masses. No hepatosplenomegaly. No hernias.   PELVIC: Normal external female genitalia without lesions. Normal hair distribution. Adequate perineal body, normal urethral meatus. Vagina moist and well rugated without lesions or discharge. Cervix pink and without lesions. No significant cystocele or rectocele. Bimanual exam showed uterus normal size, shape, position, mobile and nontender. Adnexa without masses or tenderness. Urethra and bladder normal.  EXTREMITIES: No clubbing cyanosis or edema.      A/P: Postpartum visit  -patient doing well,   -contraception: rx for micronor provided; instructed that she would have to take daily at same time to be effective    Urine cx sent  rx for fiorcet sent for HAs    F/u in Dec 2018 for annual exam    KEON Cuevas MD    "

## 2018-06-04 NOTE — TELEPHONE ENCOUNTER
butalbital-acetaminophen-caffeine -40 mg (FIORICET, ESGIC) -40 mg per tablet 30 tablet 0 6/4/2018     Sig: butalbital-acetaminophen-caffeine 50 mg-325 mg-40 mg tablet    E-Prescribing Status: Receipt confirmed by pharmacy (6/4/2018 12:43 PM CDT        Pharmacy needs directions:   Please advice

## 2018-06-06 LAB — BACTERIA UR CULT: NORMAL

## 2018-06-08 ENCOUNTER — TELEPHONE (OUTPATIENT)
Dept: OBSTETRICS AND GYNECOLOGY | Facility: CLINIC | Age: 28
End: 2018-06-08

## 2018-06-08 DIAGNOSIS — N30.00 ACUTE CYSTITIS WITHOUT HEMATURIA: Primary | ICD-10-CM

## 2018-06-08 RX ORDER — NITROFURANTOIN 25; 75 MG/1; MG/1
100 CAPSULE ORAL 2 TIMES DAILY
Qty: 14 CAPSULE | Refills: 0 | Status: SHIPPED | OUTPATIENT
Start: 2018-06-08 | End: 2018-06-15

## 2018-06-08 NOTE — TELEPHONE ENCOUNTER
----- Message from Kaila Cuevas MD sent at 6/8/2018  6:03 AM CDT -----  Inform patient that urine is positive for infection. rx for macrobid sent.    Dr cuevas

## 2018-06-25 ENCOUNTER — PATIENT MESSAGE (OUTPATIENT)
Dept: OBSTETRICS AND GYNECOLOGY | Facility: CLINIC | Age: 28
End: 2018-06-25

## 2018-06-25 NOTE — TELEPHONE ENCOUNTER
Good morning,   I wanted to inform you that i finished my Rx for the infection over a week ago and i still feel the same, i still feel discomfort when i urinate and Im still having a lot of vaginal discharge.

## 2018-06-26 ENCOUNTER — TELEPHONE (OUTPATIENT)
Dept: OBSTETRICS AND GYNECOLOGY | Facility: CLINIC | Age: 28
End: 2018-06-26

## 2018-06-26 DIAGNOSIS — N76.0 ACUTE VAGINITIS: ICD-10-CM

## 2018-06-26 DIAGNOSIS — R30.0 DYSURIA: Primary | ICD-10-CM

## 2018-06-26 RX ORDER — METRONIDAZOLE 500 MG/1
500 TABLET ORAL
Qty: 14 TABLET | Refills: 0 | Status: SHIPPED | OUTPATIENT
Start: 2018-06-26 | End: 2018-07-03

## 2018-06-26 RX ORDER — CEPHALEXIN 500 MG/1
500 CAPSULE ORAL EVERY 12 HOURS
Qty: 20 CAPSULE | Refills: 0 | Status: SHIPPED | OUTPATIENT
Start: 2018-06-26 | End: 2018-07-06

## 2018-06-26 NOTE — TELEPHONE ENCOUNTER
Spoke with pt. Who will  her meds. For her infections, and if they don't help she will call me back to set up for a urine sample to the lab.    I'm sorry that you still are having problems with urination and that  You now have vaginal discharge.     Rx for keflex was sent to treat possible UTI; if this does not clear up that infection - then I will need you to leave a urine sample in the lab.     rx for flagyl sent to pharmacy to treat vaginal discharge.

## 2018-07-19 ENCOUNTER — PATIENT MESSAGE (OUTPATIENT)
Dept: OBSTETRICS AND GYNECOLOGY | Facility: CLINIC | Age: 28
End: 2018-07-19

## 2018-07-19 NOTE — TELEPHONE ENCOUNTER
Good morning,   I would like for my birth control prescription to be changed. Im no longer giving breast milk. I would like to do the patches. So if you can pls send them ASAP i would appreciate it since Im on my menstrual now. Thank you.   [facility] [place].

## 2018-07-20 DIAGNOSIS — Z30.016 ENCOUNTER FOR INITIAL PRESCRIPTION OF TRANSDERMAL PATCH HORMONAL CONTRACEPTIVE DEVICE: Primary | ICD-10-CM

## 2018-07-20 RX ORDER — NORELGESTROMIN AND ETHINYL ESTRADIOL 35; 150 UG/MG; UG/MG
1 PATCH TRANSDERMAL
Qty: 3 PATCH | Refills: 11 | Status: SHIPPED | OUTPATIENT
Start: 2018-07-20 | End: 2019-11-21

## 2018-10-08 PROBLEM — O09.299 PRIOR POOR OBSTETRICAL HISTORY, ANTEPARTUM: Status: RESOLVED | Noted: 2017-11-01 | Resolved: 2018-10-08

## 2018-10-09 ENCOUNTER — TELEPHONE (OUTPATIENT)
Dept: OBSTETRICS AND GYNECOLOGY | Facility: CLINIC | Age: 28
End: 2018-10-09

## 2018-10-09 DIAGNOSIS — N94.6 DYSMENORRHEA: Primary | ICD-10-CM

## 2018-10-09 NOTE — TELEPHONE ENCOUNTER
Patient is calling to get refills on her medication sent to [facility]/pharmacy #[id number] - [place], [place] - [address] [phone] (Phone)   [phone] (Fax)         1. ibuprofen (ADVIL,MOTRIN) 800 MG tablet            PLEASE ADVICE.....................

## 2018-10-10 RX ORDER — IBUPROFEN 800 MG/1
800 TABLET ORAL EVERY 8 HOURS PRN
Qty: 30 TABLET | Refills: 3 | Status: SHIPPED | OUTPATIENT
Start: 2018-10-10 | End: 2019-11-21

## 2018-10-10 RX ORDER — IBUPROFEN 800 MG/1
TABLET ORAL
Status: CANCELLED | OUTPATIENT
Start: 2018-10-10

## 2018-10-26 RX ORDER — IBUPROFEN 800 MG/1
TABLET ORAL
Qty: 30 TABLET | Refills: 0 | Status: SHIPPED | OUTPATIENT
Start: 2018-10-26 | End: 2019-11-21

## 2019-02-20 ENCOUNTER — HOSPITAL ENCOUNTER (EMERGENCY)
Facility: HOSPITAL | Age: 29
Discharge: HOME | End: 2019-02-20
Attending: EMERGENCY MEDICINE
Payer: MEDICAID

## 2019-02-20 VITALS
OXYGEN SATURATION: 99 % | HEART RATE: 91 BPM | DIASTOLIC BLOOD PRESSURE: 68 MMHG | RESPIRATION RATE: 18 BRPM | HEIGHT: 61 IN | WEIGHT: 230 LBS | SYSTOLIC BLOOD PRESSURE: 130 MMHG | BODY MASS INDEX: 43.43 KG/M2 | TEMPERATURE: 98 F

## 2019-02-20 DIAGNOSIS — R51.9 NONINTRACTABLE EPISODIC HEADACHE, UNSPECIFIED HEADACHE TYPE: Primary | ICD-10-CM

## 2019-02-20 LAB
B-HCG UR QL: NEGATIVE
CTP QC/QA: YES

## 2019-02-20 PROCEDURE — 96375 TX/PRO/DX INJ NEW DRUG ADDON: CPT | Mod: ER

## 2019-02-20 PROCEDURE — 63600175 PHARM REV CODE 636 W HCPCS: Mod: ER | Performed by: EMERGENCY MEDICINE

## 2019-02-20 PROCEDURE — 81025 URINE PREGNANCY TEST: CPT | Mod: ER | Performed by: EMERGENCY MEDICINE

## 2019-02-20 PROCEDURE — 96374 THER/PROPH/DIAG INJ IV PUSH: CPT | Mod: ER

## 2019-02-20 PROCEDURE — 99284 EMERGENCY DEPT VISIT MOD MDM: CPT | Mod: 25,ER

## 2019-02-20 RX ORDER — PROMETHAZINE HYDROCHLORIDE 25 MG/1
25 TABLET ORAL EVERY 6 HOURS PRN
Qty: 15 TABLET | Refills: 0 | Status: SHIPPED | OUTPATIENT
Start: 2019-02-20

## 2019-02-20 RX ORDER — KETOROLAC TROMETHAMINE 30 MG/ML
30 INJECTION, SOLUTION INTRAMUSCULAR; INTRAVENOUS
Status: COMPLETED | OUTPATIENT
Start: 2019-02-20 | End: 2019-02-20

## 2019-02-20 RX ORDER — PROCHLORPERAZINE EDISYLATE 5 MG/ML
10 INJECTION INTRAMUSCULAR; INTRAVENOUS ONCE
Status: COMPLETED | OUTPATIENT
Start: 2019-02-20 | End: 2019-02-20

## 2019-02-20 RX ORDER — BUTALBITAL, ACETAMINOPHEN AND CAFFEINE 50; 325; 40 MG/1; MG/1; MG/1
1 TABLET ORAL EVERY 4 HOURS PRN
Qty: 15 TABLET | Refills: 0 | Status: SHIPPED | OUTPATIENT
Start: 2019-02-20 | End: 2019-03-22

## 2019-02-20 RX ADMIN — KETOROLAC TROMETHAMINE 30 MG: 30 INJECTION, SOLUTION INTRAMUSCULAR at 11:02

## 2019-02-20 RX ADMIN — PROCHLORPERAZINE EDISYLATE 10 MG: 5 INJECTION INTRAMUSCULAR; INTRAVENOUS at 11:02

## 2019-02-20 NOTE — ED PROVIDER NOTES
"Encounter Date: 2/20/2019    SCRIBE #1 NOTE: I, Michael Rich, am scribing for, and in the presence of,  Dr. Bates. I have scribed the following portions of the note - Other sections scribed: HPI, ROS, PE.       History     Chief Complaint   Patient presents with    Headache     pt reports she has been having an intermittent headache x 2 days. pt reports taking ibuprofen this morning at 7am but vomiting shortly after. denies blurred vision, dizziness, chest pain or any other symptoms     This is a 28 y.o. female who presents to the ED with a complaint of an intermittent right forehead "sinus" HA that began two days ago.  Light worsens her HA.  Vomiting provides relief.  She has taken ibuprofen 800 mg but threw this medication up and Allegra without relief. She rates her pain an eight out of ten in severity. Pt has been dx 'd with migraines in the past.  Patient says that she feels that she is in a foggy state.  Pt endorses blurred vision, nasal congestion, sinus pressure, nausea, one episode emesis, and dizziness that she describes as "feeling lightheaded".  She denies fever, coughing, CP, abdominal pain, neck pain, dysuria, rash, or speech difficulty. Pt reports numbness to her right arm that went away on its own. She denies weakness.       The history is provided by the patient.     Review of patient's allergies indicates:  No Known Allergies  Past Medical History:   Diagnosis Date    Migraines      History reviewed. No pertinent surgical history.  History reviewed. No pertinent family history.  Social History     Tobacco Use    Smoking status: Never Smoker   Substance Use Topics    Alcohol use: No    Drug use: No     Review of Systems   Constitutional: Negative for fever.   HENT: Positive for congestion and sinus pressure.    Eyes: Positive for photophobia and visual disturbance (Blurred).   Respiratory: Negative for cough.    Cardiovascular: Negative for chest pain.   Gastrointestinal: Positive for " nausea and vomiting. Negative for abdominal pain.   Genitourinary: Negative for dysuria.   Musculoskeletal: Negative for neck pain.   Skin: Negative for rash.   Neurological: Positive for dizziness, light-headedness, numbness (Resolved) and headaches. Negative for speech difficulty and weakness.       Physical Exam     Initial Vitals [02/20/19 0921]   BP Pulse Resp Temp SpO2   125/71 76 18 97.9 °F (36.6 °C) 98 %      MAP       --         Physical Exam    Nursing note and vitals reviewed.  Constitutional: She appears well-developed and well-nourished.   HENT:   Head: Normocephalic and atraumatic.   Mouth/Throat: Uvula is midline, oropharynx is clear and moist and mucous membranes are normal. No oropharyngeal exudate, posterior oropharyngeal edema or posterior oropharyngeal erythema.   Eyes: Conjunctivae and EOM are normal. Pupils are equal, round, and reactive to light.   Neck: Normal range of motion. Neck supple. No spinous process tenderness and no muscular tenderness present. No edema, no erythema and normal range of motion present. No neck rigidity.   Cardiovascular: Normal rate, regular rhythm, normal heart sounds and intact distal pulses. Exam reveals no gallop and no friction rub.    No murmur heard.  Pulmonary/Chest: Effort normal and breath sounds normal. No respiratory distress. She has no wheezes. She has no rhonchi. She has no rales. She exhibits no tenderness.   Musculoskeletal: Normal range of motion.        Lumbar back: She exhibits normal range of motion, no tenderness, no bony tenderness, no swelling and no edema.   Neurological: She is alert and oriented to person, place, and time. She has normal strength. No cranial nerve deficit. GCS score is 15. GCS eye subscore is 4. GCS verbal subscore is 5. GCS motor subscore is 6.   Skin: Skin is warm and dry.   Psychiatric: She has a normal mood and affect.         ED Course   Procedures  Labs Reviewed   POCT URINE PREGNANCY          Imaging Results     None          Medical Decision Making:   Initial Assessment:   This is a 28 y.o. female who presents to the ED with a complaint of right and forehead HA, blurred vision, nasal congestion, sinus pressure, nausea, emesis, and dizziness.       The pt's physical examination produced no significant findings.  GCS is 15, no neurological deficits, normal speech  Differential Diagnosis:      Clinical Tests:   Lab Tests: Ordered  ED Management:  I will order a UPT.    Patient will be treated with saline lock IV, ketorolac, and prochlorperazine.  Patient's symptoms appear typical for migraine headache.  Patient will be discharged on Fioricet and Phenergan.  Patient's symptoms improved if the medication            Scribe Attestation:   Scribe #1: I performed the above scribed service and the documentation accurately describes the services I performed. I attest to the accuracy of the note.       I, Dr. Amisha Bates, personally performed the services described in this documentation. All medical record entries made by the scribe were at my direction and in my presence.  I have reviewed the chart and agree that the record reflects my personal performance and is accurate and complete. Amisha Bates MD.  10:24 AM 02/20/2019             Clinical Impression:     1. Nonintractable episodic headache, unspecified headache type                                  Amisha Bates MD  02/20/19 1026       Amisha Bates MD  02/20/19 1206

## 2019-11-06 ENCOUNTER — TELEPHONE (OUTPATIENT)
Dept: OBSTETRICS AND GYNECOLOGY | Facility: CLINIC | Age: 29
End: 2019-11-06

## 2019-11-06 NOTE — TELEPHONE ENCOUNTER
Pt needs an appt for amenorrhea  Advised pt first available for Dr. Cuevas isn't until after the first week of December- but i informed her that i can schedule her to see one of the other doctors while Dr. Cuevas is out- pt accepted - appt set for her to see Dr. Thomas on 11/21/19.

## 2019-11-06 NOTE — TELEPHONE ENCOUNTER
----- Message from Magda Lua sent at 11/6/2019 10:09 AM CST -----  Contact: [phone]/ self   Patient requesting to speak with you regarding getting a sooner appointment scheduled to confirm a possible pregnancy. Please call.

## 2019-11-18 NOTE — DISCHARGE INSTRUCTIONS
Rest.  Drink plenty of fluids.  Return here at any time.  Call your doctor for close follow-up   You can access the FollowMyHealth Patient Portal offered by [facility] by registering at the following website: [email or web address]. By joining [employer]’s FollowMyHealth portal, you will also be able to view your health information using other applications (apps) compatible with our system.

## 2019-11-21 ENCOUNTER — OFFICE VISIT (OUTPATIENT)
Dept: OBSTETRICS AND GYNECOLOGY | Facility: CLINIC | Age: 29
End: 2019-11-21
Payer: MEDICAID

## 2019-11-21 ENCOUNTER — LAB VISIT (OUTPATIENT)
Dept: LAB | Facility: HOSPITAL | Age: 29
End: 2019-11-21
Attending: OBSTETRICS & GYNECOLOGY
Payer: MEDICAID

## 2019-11-21 ENCOUNTER — TELEPHONE (OUTPATIENT)
Dept: OBSTETRICS AND GYNECOLOGY | Facility: CLINIC | Age: 29
End: 2019-11-21

## 2019-11-21 VITALS
DIASTOLIC BLOOD PRESSURE: 80 MMHG | HEIGHT: 61 IN | BODY MASS INDEX: 44.87 KG/M2 | WEIGHT: 237.63 LBS | SYSTOLIC BLOOD PRESSURE: 120 MMHG

## 2019-11-21 DIAGNOSIS — N91.2 AMENORRHEA: Primary | ICD-10-CM

## 2019-11-21 DIAGNOSIS — R68.89 COLD INTOLERANCE: ICD-10-CM

## 2019-11-21 DIAGNOSIS — N91.2 AMENORRHEA: ICD-10-CM

## 2019-11-21 DIAGNOSIS — R12 HEARTBURN DURING PREGNANCY IN FIRST TRIMESTER: ICD-10-CM

## 2019-11-21 DIAGNOSIS — O26.891 HEARTBURN DURING PREGNANCY IN FIRST TRIMESTER: ICD-10-CM

## 2019-11-21 DIAGNOSIS — O09.299 HISTORY OF MISCARRIAGE, CURRENTLY PREGNANT: ICD-10-CM

## 2019-11-21 LAB
ABO + RH BLD: NORMAL
BLD GP AB SCN CELLS X3 SERPL QL: NORMAL
HCG INTACT+B SERPL-ACNC: NORMAL MIU/ML
PROGEST SERPL-MCNC: 14 NG/ML
TSH SERPL DL<=0.005 MIU/L-ACNC: 2.12 UIU/ML (ref 0.4–4)

## 2019-11-21 PROCEDURE — 99999 PR PBB SHADOW E&M-EST. PATIENT-LVL III: ICD-10-PCS | Mod: PBBFAC,,, | Performed by: OBSTETRICS & GYNECOLOGY

## 2019-11-21 PROCEDURE — 84443 ASSAY THYROID STIM HORMONE: CPT

## 2019-11-21 PROCEDURE — 84702 CHORIONIC GONADOTROPIN TEST: CPT

## 2019-11-21 PROCEDURE — 99213 OFFICE O/P EST LOW 20 MIN: CPT | Mod: PBBFAC,TH,PO | Performed by: OBSTETRICS & GYNECOLOGY

## 2019-11-21 PROCEDURE — 99213 OFFICE O/P EST LOW 20 MIN: CPT | Mod: S$PBB,TH,, | Performed by: OBSTETRICS & GYNECOLOGY

## 2019-11-21 PROCEDURE — 36415 COLL VENOUS BLD VENIPUNCTURE: CPT

## 2019-11-21 PROCEDURE — 84144 ASSAY OF PROGESTERONE: CPT

## 2019-11-21 PROCEDURE — 99213 PR OFFICE/OUTPT VISIT, EST, LEVL III, 20-29 MIN: ICD-10-PCS | Mod: S$PBB,TH,, | Performed by: OBSTETRICS & GYNECOLOGY

## 2019-11-21 PROCEDURE — 99999 PR PBB SHADOW E&M-EST. PATIENT-LVL III: CPT | Mod: PBBFAC,,, | Performed by: OBSTETRICS & GYNECOLOGY

## 2019-11-21 PROCEDURE — 86850 RBC ANTIBODY SCREEN: CPT

## 2019-11-21 RX ORDER — PANTOPRAZOLE SODIUM 40 MG/1
40 TABLET, DELAYED RELEASE ORAL DAILY
Qty: 30 TABLET | Refills: 12 | Status: SHIPPED | OUTPATIENT
Start: 2019-11-21 | End: 2020-11-20

## 2019-11-21 NOTE — PROGRESS NOTES
"  Subjective:       Patient ID: Brannon Lei is a 29 y.o. female.    Chief Complaint:  Absent Menses      History of Present Illness  28yo  with amenorrhea, 6w4d by LMP of 10/6/19.  Today only c/o heartburn and feeling cold.  No fevers/chills.  Taking tums for heartburn, but has to take them multiple times a day.  No n/v.  No vb/spotting.  H/o SAB followed by full term uncomplicated .  Nonsmoker.  Taking PNV.  Patient of Dr. Cuevas's.      GYN & OB History  Patient's last menstrual period was 10/06/2019.   Date of Last Pap: 2016    OB History    Para Term  AB Living   2 1 1 0 1 1   SAB TAB Ectopic Multiple Live Births   1 0 0 0 1      # Outcome Date GA Lbr Ender/2nd Weight Sex Delivery Anes PTL Lv   2 Term 18 38w2d 04:18 / 00:14 3.397 kg (7 lb 7.8 oz) M Vag-Spont EPI, Spinal N LAURO   1 SAB 2016 11w0d       ND       Review of Systems  Review of Systems: neg x 10, except as per HPI        Objective:    Physical Exam     /80 (BP Location: Left arm, Patient Position: Sitting, BP Method: Medium (Manual))   Ht 5' 1" (1.549 m)   Wt 107.8 kg (237 lb 10.5 oz)   LMP 10/06/2019   BMI 44.90 kg/m²     UPT positive    Gen: NAD  Resp: Normal respiratory effort  Abd: soft, NT  Pelvic: deferred  Ext: normal ROM  Psych: appropriate affect  Neuro: grossly intact    Assessment:        1. Amenorrhea    2. History of miscarriage, currently pregnant    3. Cold intolerance    4. Heartburn during pregnancy in first trimester              Plan:      1.  UPT positive today - 6w4d by LMP.  Will get dating US next available.   2.  Continue daily PNV.  Discussed routine prenatal visits.  3.  eRx Protonix for heartburn.  Tums prn.  4.  H/o SAB - will get beta/prog today.    5.  Counseling done, precautions given, all questions answered.  RTC 2 wks for dating US and new OB visit with Dr. Cuevas.      "

## 2019-11-21 NOTE — TELEPHONE ENCOUNTER
Called pt and informed her of f/u ob visit. Pt is scheduled to be seen 12/12/2019. Pt verbalized understanding.

## 2019-12-05 ENCOUNTER — PROCEDURE VISIT (OUTPATIENT)
Dept: OBSTETRICS AND GYNECOLOGY | Facility: CLINIC | Age: 29
End: 2019-12-05
Payer: MEDICAID

## 2019-12-05 DIAGNOSIS — N91.2 AMENORRHEA: ICD-10-CM

## 2019-12-05 PROCEDURE — 99499 NO LOS: ICD-10-PCS | Mod: S$PBB,,, | Performed by: PEDIATRICS

## 2019-12-05 PROCEDURE — 76817 TRANSVAGINAL US OBSTETRIC: CPT | Mod: PBBFAC,PO | Performed by: PEDIATRICS

## 2019-12-05 PROCEDURE — 76815 PR  US,PREGNANT UTERUS,LIMITED, 1/> FETUSES: ICD-10-PCS | Mod: 26,S$PBB,, | Performed by: PEDIATRICS

## 2019-12-05 PROCEDURE — 99499 UNLISTED E&M SERVICE: CPT | Mod: S$PBB,,, | Performed by: PEDIATRICS

## 2019-12-05 PROCEDURE — 76817 TRANSVAGINAL US OBSTETRIC: CPT | Mod: 26,S$PBB,, | Performed by: PEDIATRICS

## 2019-12-05 PROCEDURE — 76817 PR US, OB, TRANSVAG APPROACH: ICD-10-PCS | Mod: 26,S$PBB,, | Performed by: PEDIATRICS

## 2019-12-05 PROCEDURE — 76815 OB US LIMITED FETUS(S): CPT | Mod: PBBFAC,PO | Performed by: PEDIATRICS

## 2019-12-05 PROCEDURE — 76815 OB US LIMITED FETUS(S): CPT | Mod: 26,S$PBB,, | Performed by: PEDIATRICS

## 2019-12-12 ENCOUNTER — ROUTINE PRENATAL (OUTPATIENT)
Dept: OBSTETRICS AND GYNECOLOGY | Facility: CLINIC | Age: 29
End: 2019-12-12
Payer: MEDICAID

## 2019-12-12 ENCOUNTER — LAB VISIT (OUTPATIENT)
Dept: LAB | Facility: HOSPITAL | Age: 29
End: 2019-12-12
Attending: OBSTETRICS & GYNECOLOGY
Payer: MEDICAID

## 2019-12-12 VITALS — SYSTOLIC BLOOD PRESSURE: 98 MMHG | WEIGHT: 239.19 LBS | DIASTOLIC BLOOD PRESSURE: 80 MMHG | BODY MASS INDEX: 45.2 KG/M2

## 2019-12-12 DIAGNOSIS — Z3A.09 9 WEEKS GESTATION OF PREGNANCY: ICD-10-CM

## 2019-12-12 DIAGNOSIS — Z34.81 ENCOUNTER FOR SUPERVISION OF OTHER NORMAL PREGNANCY IN FIRST TRIMESTER: ICD-10-CM

## 2019-12-12 DIAGNOSIS — Z34.81 ENCOUNTER FOR SUPERVISION OF OTHER NORMAL PREGNANCY IN FIRST TRIMESTER: Primary | ICD-10-CM

## 2019-12-12 LAB
BASOPHILS # BLD AUTO: 0.01 K/UL (ref 0–0.2)
BASOPHILS NFR BLD: 0.1 % (ref 0–1.9)
DIFFERENTIAL METHOD: ABNORMAL
EOSINOPHIL # BLD AUTO: 0.1 K/UL (ref 0–0.5)
EOSINOPHIL NFR BLD: 1 % (ref 0–8)
ERYTHROCYTE [DISTWIDTH] IN BLOOD BY AUTOMATED COUNT: 13.1 % (ref 11.5–14.5)
HCT VFR BLD AUTO: 36.5 % (ref 37–48.5)
HGB BLD-MCNC: 12.3 G/DL (ref 12–16)
LYMPHOCYTES # BLD AUTO: 2.5 K/UL (ref 1–4.8)
LYMPHOCYTES NFR BLD: 31.9 % (ref 18–48)
MCH RBC QN AUTO: 29.4 PG (ref 27–31)
MCHC RBC AUTO-ENTMCNC: 33.7 G/DL (ref 32–36)
MCV RBC AUTO: 87 FL (ref 82–98)
MONOCYTES # BLD AUTO: 0.5 K/UL (ref 0.3–1)
MONOCYTES NFR BLD: 6.2 % (ref 4–15)
NEUTROPHILS # BLD AUTO: 4.8 K/UL (ref 1.8–7.7)
NEUTROPHILS NFR BLD: 60.8 % (ref 38–73)
PLATELET # BLD AUTO: 271 K/UL (ref 150–350)
PMV BLD AUTO: 11.1 FL (ref 9.2–12.9)
RBC # BLD AUTO: 4.18 M/UL (ref 4–5.4)
WBC # BLD AUTO: 7.91 K/UL (ref 3.9–12.7)

## 2019-12-12 PROCEDURE — 36415 COLL VENOUS BLD VENIPUNCTURE: CPT

## 2019-12-12 PROCEDURE — 85025 COMPLETE CBC W/AUTO DIFF WBC: CPT

## 2019-12-12 PROCEDURE — 86592 SYPHILIS TEST NON-TREP QUAL: CPT

## 2019-12-12 PROCEDURE — 87491 CHLMYD TRACH DNA AMP PROBE: CPT

## 2019-12-12 PROCEDURE — 86703 HIV-1/HIV-2 1 RESULT ANTBDY: CPT

## 2019-12-12 PROCEDURE — 99212 OFFICE O/P EST SF 10 MIN: CPT | Mod: PBBFAC,TH,PO | Performed by: OBSTETRICS & GYNECOLOGY

## 2019-12-12 PROCEDURE — 88175 CYTOPATH C/V AUTO FLUID REDO: CPT

## 2019-12-12 PROCEDURE — 99213 OFFICE O/P EST LOW 20 MIN: CPT | Mod: TH,S$PBB,, | Performed by: OBSTETRICS & GYNECOLOGY

## 2019-12-12 PROCEDURE — 86762 RUBELLA ANTIBODY: CPT

## 2019-12-12 PROCEDURE — 99213 PR OFFICE/OUTPT VISIT, EST, LEVL III, 20-29 MIN: ICD-10-PCS | Mod: TH,S$PBB,, | Performed by: OBSTETRICS & GYNECOLOGY

## 2019-12-12 PROCEDURE — 99999 PR PBB SHADOW E&M-EST. PATIENT-LVL II: CPT | Mod: PBBFAC,,, | Performed by: OBSTETRICS & GYNECOLOGY

## 2019-12-12 PROCEDURE — 87340 HEPATITIS B SURFACE AG IA: CPT

## 2019-12-12 PROCEDURE — 99999 PR PBB SHADOW E&M-EST. PATIENT-LVL II: ICD-10-PCS | Mod: PBBFAC,,, | Performed by: OBSTETRICS & GYNECOLOGY

## 2019-12-12 RX ORDER — TOPIRAMATE 25 MG/1
25 TABLET ORAL DAILY
COMMUNITY
Start: 2019-12-10

## 2019-12-12 RX ORDER — SERTRALINE HYDROCHLORIDE 50 MG/1
TABLET, FILM COATED ORAL
COMMUNITY
Start: 2019-12-10

## 2019-12-13 LAB
C TRACH DNA SPEC QL NAA+PROBE: NOT DETECTED
HBV SURFACE AG SERPL QL IA: NEGATIVE
HIV 1+2 AB+HIV1 P24 AG SERPL QL IA: NEGATIVE
N GONORRHOEA DNA SPEC QL NAA+PROBE: NOT DETECTED
RPR SER QL: NORMAL
RUBV IGG SER-ACNC: 9.9 IU/ML
RUBV IGG SER-IMP: ABNORMAL

## 2019-12-18 ENCOUNTER — PATIENT MESSAGE (OUTPATIENT)
Dept: OBSTETRICS AND GYNECOLOGY | Facility: CLINIC | Age: 29
End: 2019-12-18

## 2020-01-02 ENCOUNTER — PROCEDURE VISIT (OUTPATIENT)
Dept: MATERNAL FETAL MEDICINE | Facility: HOSPITAL | Age: 30
End: 2020-01-02
Payer: MEDICAID

## 2020-01-02 ENCOUNTER — LAB VISIT (OUTPATIENT)
Dept: LAB | Facility: HOSPITAL | Age: 30
End: 2020-01-02
Attending: OBSTETRICS & GYNECOLOGY
Payer: MEDICAID

## 2020-01-02 VITALS — SYSTOLIC BLOOD PRESSURE: 118 MMHG | DIASTOLIC BLOOD PRESSURE: 76 MMHG | WEIGHT: 237 LBS | BODY MASS INDEX: 44.78 KG/M2

## 2020-01-02 DIAGNOSIS — Z36.82 ENCOUNTER FOR NUCHAL TRANSLUCENCY TESTING: Primary | ICD-10-CM

## 2020-01-02 DIAGNOSIS — Z36.82 ENCOUNTER FOR NUCHAL TRANSLUCENCY TESTING: ICD-10-CM

## 2020-01-02 DIAGNOSIS — Z3A.09 9 WEEKS GESTATION OF PREGNANCY: ICD-10-CM

## 2020-01-02 DIAGNOSIS — Z34.81 ENCOUNTER FOR SUPERVISION OF OTHER NORMAL PREGNANCY IN FIRST TRIMESTER: ICD-10-CM

## 2020-01-02 PROCEDURE — 99499 NO LOS: ICD-10-PCS | Mod: ,,, | Performed by: PEDIATRICS

## 2020-01-02 PROCEDURE — 76813 OB US NUCHAL MEAS 1 GEST: CPT | Mod: 26,,, | Performed by: PEDIATRICS

## 2020-01-02 PROCEDURE — 36415 COLL VENOUS BLD VENIPUNCTURE: CPT

## 2020-01-02 PROCEDURE — 99499 UNLISTED E&M SERVICE: CPT | Mod: ,,, | Performed by: PEDIATRICS

## 2020-01-02 PROCEDURE — 81508 FTL CGEN ABNOR TWO PROTEINS: CPT

## 2020-01-02 PROCEDURE — 76813 PR US, OB NUCHAL, TRANSABDOM/TRANSVAG, FIRST GESTATION: ICD-10-PCS | Mod: 26,,, | Performed by: PEDIATRICS

## 2020-01-02 PROCEDURE — 76801 OB US < 14 WKS SINGLE FETUS: CPT | Mod: 26,,, | Performed by: PEDIATRICS

## 2020-01-02 PROCEDURE — 76801 PR US, OB <14WKS, TRANSABD, SINGLE GESTATION: ICD-10-PCS | Mod: 26,,, | Performed by: PEDIATRICS

## 2020-01-02 PROCEDURE — 76813 OB US NUCHAL MEAS 1 GEST: CPT

## 2020-01-04 LAB
FINAL PATHOLOGIC DIAGNOSIS: NORMAL
Lab: NORMAL

## 2020-01-06 LAB
# FETUSES US: NORMAL
AGE AT DELIVERY: 28
B-HCG MOM SERPL: NORMAL
B-HCG SERPL-ACNC: 110.8 IU/ML
FET CRL US.MEAS: 69.3 MM
FET NASAL BONE LENGTH US.MEAS: NORMAL MM
FET NUCHAL FOLD MOM THICKNESS US.MEAS: NORMAL
FET NUCHAL FOLD THICKNESS US.MEAS: 1.5 MM
FET TS 21 RISK FROM MAT AGE: NORMAL
GA (DAYS): 1 D
GA (WEEKS): 13 WK
IDDM PATIENT QL: NORMAL
INTEGRATED SCN PATIENT-IMP NAR: NORMAL
INTEGRATED SCN PATIENT-IMP: NEGATIVE
PAPP-A MOM SERPL: NORMAL
PAPP-A SERPL-MCNC: 579.7 NG/ML
SMOKING STATUS FTND: NO
TS 18 RISK FETUS: NORMAL
TS 21 RISK FETUS: NORMAL
US DATE: NORMAL

## 2020-01-20 ENCOUNTER — HOSPITAL ENCOUNTER (EMERGENCY)
Facility: HOSPITAL | Age: 30
Discharge: HOME | End: 2020-01-20
Attending: EMERGENCY MEDICINE
Payer: MEDICAID

## 2020-01-20 ENCOUNTER — PATIENT MESSAGE (OUTPATIENT)
Dept: OBSTETRICS AND GYNECOLOGY | Facility: CLINIC | Age: 30
End: 2020-01-20

## 2020-01-20 VITALS
SYSTOLIC BLOOD PRESSURE: 110 MMHG | WEIGHT: 230 LBS | BODY MASS INDEX: 43.43 KG/M2 | DIASTOLIC BLOOD PRESSURE: 70 MMHG | HEIGHT: 61 IN | OXYGEN SATURATION: 96 % | RESPIRATION RATE: 18 BRPM | TEMPERATURE: 99 F | HEART RATE: 90 BPM

## 2020-01-20 DIAGNOSIS — M79.18 MUSCULOSKELETAL PAIN: Primary | ICD-10-CM

## 2020-01-20 DIAGNOSIS — O26.892: ICD-10-CM

## 2020-01-20 DIAGNOSIS — R10.84: ICD-10-CM

## 2020-01-20 LAB
BILIRUBIN, POC UA: ABNORMAL
BLOOD, POC UA: NEGATIVE
CLARITY, POC UA: CLEAR
COLOR, POC UA: YELLOW
GLUCOSE, POC UA: NEGATIVE
KETONES, POC UA: NEGATIVE
LEUKOCYTE EST, POC UA: NEGATIVE
NITRITE, POC UA: NEGATIVE
PH UR STRIP: 5.5 [PH]
PROTEIN, POC UA: ABNORMAL
SPECIFIC GRAVITY, POC UA: >=1.03
UROBILINOGEN, POC UA: 0.2 E.U./DL

## 2020-01-20 PROCEDURE — 99282 EMERGENCY DEPT VISIT SF MDM: CPT | Mod: ER

## 2020-01-20 PROCEDURE — 81003 URINALYSIS AUTO W/O SCOPE: CPT | Mod: ER

## 2020-01-20 RX ORDER — ACETAMINOPHEN 500 MG
500 TABLET ORAL EVERY 6 HOURS PRN
Qty: 30 TABLET | Refills: 0 | Status: SHIPPED | OUTPATIENT
Start: 2020-01-20

## 2020-01-20 NOTE — ED PROVIDER NOTES
Encounter Date: 2020    SCRIBE #1 NOTE: I, Anna Blanchard, am scribing for, and in the presence of,  Dr. Wilson. I have scribed the following portions of the note - Other sections scribed: HPI, ROS, PE.       History     Chief Complaint   Patient presents with    Abdominal Pain     15 weeks pregnant and had abdominal pain yesterday  today it is more of a soreness,  obgyn  Nahomi Cuevas.    sent her a message through the portal.  denies bleeding.  no discharge.      Brannon Lei is a 29 y.o. Female who is 15 weeks pregnant who presents to the ED complaining of abdominal pain x 1 day.  Patient states last night her 2-year-old was playing around and rammed her in the belly with his head.  Patient states now her muscles are sore whenever she moves.  Describes pain as a musculoskeletal soreness with movement and occasional light cramps. Her OBGYN referred her to ER. Denies vaginal bleeding, vaginal discharge, and frequency. A1.     The history is provided by the patient. No  was used.     Review of patient's allergies indicates:  No Known Allergies  Past Medical History:   Diagnosis Date    Migraines      No past surgical history on file.  No family history on file.  Social History     Tobacco Use    Smoking status: Never Smoker   Substance Use Topics    Alcohol use: No    Drug use: No     Review of Systems   Constitutional: Negative for fever.   HENT: Negative for sore throat.    Respiratory: Negative for shortness of breath.    Cardiovascular: Negative for chest pain.   Gastrointestinal: Positive for abdominal pain. Negative for nausea.   Genitourinary: Negative for dysuria, frequency, vaginal bleeding and vaginal discharge.   Musculoskeletal: Negative for back pain.   Skin: Negative for rash.   Neurological: Negative for weakness.   Hematological: Does not bruise/bleed easily.   All other systems reviewed and are negative.      Physical Exam     Initial Vitals [20 1052]    BP Pulse Resp Temp SpO2   110/70 90 18 98.6 °F (37 °C) 96 %      MAP       --         Patient gave consent to have physical exam performed.    Physical Exam    Nursing note and vitals reviewed.  Constitutional: She appears well-developed and well-nourished. She is Obese .   HENT:   Head: Normocephalic and atraumatic.   Right Ear: External ear normal.   Left Ear: External ear normal.   Nose: Nose normal.   Mouth/Throat: Oropharynx is clear and moist.   Eyes: Conjunctivae and EOM are normal. Pupils are equal, round, and reactive to light.   Neck: Normal range of motion and phonation normal. Neck supple.   Cardiovascular: Normal rate, regular rhythm, normal heart sounds and intact distal pulses. Exam reveals no gallop and no friction rub.    No murmur heard.  Pulmonary/Chest: Effort normal and breath sounds normal. No stridor. No respiratory distress. She has no wheezes. She has no rhonchi. She has no rales. She exhibits no tenderness.   Abdominal: Soft. Bowel sounds are normal. She exhibits no distension. There is no tenderness. There is no rigidity, no rebound and no guarding.   Musculoskeletal: Normal range of motion. She exhibits no edema or tenderness.   Neurological: She is alert and oriented to person, place, and time. She has normal strength. No cranial nerve deficit or sensory deficit. GCS score is 15. GCS eye subscore is 4. GCS verbal subscore is 5. GCS motor subscore is 6.   Skin: Skin is warm and dry. Capillary refill takes less than 2 seconds. No rash noted.   Psychiatric: She has a normal mood and affect. Her behavior is normal.         ED Course   Procedures  Labs Reviewed   POCT URINALYSIS W/O SCOPE - Abnormal; Notable for the following components:       Result Value    Bilirubin, UA 1+ (*)     Spec Grav UA >=1.030 (*)     Protein, UA 1+ (*)     All other components within normal limits   POCT URINALYSIS W/O SCOPE     11:55 AM  Patient was offered, but declined ultrasound. Discussed fetal heart tones  with patient.             Medical Decision Making:   History:   Old Medical Records: I decided to obtain old medical records.  Clinical Tests:   Lab Tests: Ordered and Reviewed  Chief complaint: abdominal pain  Differential diagnosis: pregnancy, miscarriage, musculoskeletal pain, UTI  Patient refused OB ultrasound in the ER today.  Offered patient Tylenol for her abdominal pain but she declined.  Ordered UA  Discussed treatment, prescriptions, and labs results.    Fill and take prescriptions as directed.  Return to the ED if symptoms worsen or do not resolve.   Answered questions and discussed discharge plan.    Patient feels better and is ready for discharge.  Follow up with PCP/specialist in 1 day.          Scribe Attestation:   Scribe #1: I performed the above scribed service and the documentation accurately describes the services I performed. I attest to the accuracy of the note.     I, Dr. Pita Wilson, personally performed the services described in this documentation. This document was produced by a scribe under my direction and in my presence. All medical record entries made by the scribe were at my direction and in my presence.  I have reviewed the chart and agree that the record reflects my personal performance and is accurate and complete. Pita Wilson DO.     01/20/2020 6:07 PM                        Clinical Impression:     1. Musculoskeletal pain    2. Generalized abdominal pain affecting pregnancy in second trimester                                Pita Wilson DO  01/20/20 9099

## 2020-01-20 NOTE — DISCHARGE INSTRUCTIONS
Please follow up with her OBGYN in 1 day.  Go directly to hospital that your OBGYN works at if your symptoms worsen or do not improve.

## 2020-01-23 ENCOUNTER — ROUTINE PRENATAL (OUTPATIENT)
Dept: OBSTETRICS AND GYNECOLOGY | Facility: CLINIC | Age: 30
End: 2020-01-23
Payer: MEDICAID

## 2020-01-23 ENCOUNTER — LAB VISIT (OUTPATIENT)
Dept: LAB | Facility: HOSPITAL | Age: 30
End: 2020-01-23
Attending: OBSTETRICS & GYNECOLOGY
Payer: MEDICAID

## 2020-01-23 VITALS
DIASTOLIC BLOOD PRESSURE: 70 MMHG | SYSTOLIC BLOOD PRESSURE: 110 MMHG | BODY MASS INDEX: 45.03 KG/M2 | WEIGHT: 238.31 LBS

## 2020-01-23 DIAGNOSIS — Z3A.15 15 WEEKS GESTATION OF PREGNANCY: ICD-10-CM

## 2020-01-23 DIAGNOSIS — Z34.82 ENCOUNTER FOR SUPERVISION OF OTHER NORMAL PREGNANCY IN SECOND TRIMESTER: ICD-10-CM

## 2020-01-23 DIAGNOSIS — Z34.82 ENCOUNTER FOR SUPERVISION OF OTHER NORMAL PREGNANCY IN SECOND TRIMESTER: Primary | ICD-10-CM

## 2020-01-23 PROCEDURE — 99999 PR PBB SHADOW E&M-EST. PATIENT-LVL II: ICD-10-PCS | Mod: PBBFAC,,, | Performed by: OBSTETRICS & GYNECOLOGY

## 2020-01-23 PROCEDURE — 99212 OFFICE O/P EST SF 10 MIN: CPT | Mod: PBBFAC,TH,PO | Performed by: OBSTETRICS & GYNECOLOGY

## 2020-01-23 PROCEDURE — 99213 OFFICE O/P EST LOW 20 MIN: CPT | Mod: TH,S$PBB,, | Performed by: OBSTETRICS & GYNECOLOGY

## 2020-01-23 PROCEDURE — 36415 COLL VENOUS BLD VENIPUNCTURE: CPT

## 2020-01-23 PROCEDURE — 99999 PR PBB SHADOW E&M-EST. PATIENT-LVL II: CPT | Mod: PBBFAC,,, | Performed by: OBSTETRICS & GYNECOLOGY

## 2020-01-23 PROCEDURE — 99213 PR OFFICE/OUTPT VISIT, EST, LEVL III, 20-29 MIN: ICD-10-PCS | Mod: TH,S$PBB,, | Performed by: OBSTETRICS & GYNECOLOGY

## 2020-01-23 PROCEDURE — 81511 FTL CGEN ABNOR FOUR ANAL: CPT

## 2020-01-23 NOTE — PROGRESS NOTES
Reports reflux improved with protonix and tums    30 yo  female @ 15.5 wks (EDC 2020) who presents for f/u OB visit.    1) SIUP  -s/p official dating scan  -Rh positive  -pap wnl  -wants genetic testing: ordered    F/u in 4 wks, anatomy scan    margarito ordaz MD

## 2020-01-27 LAB
# FETUSES US: NORMAL
AFP MOM SERPL: 0.81
AFP SERPL-MCNC: 20.4 NG/ML
AGE AT DELIVERY: 28
B-HCG MOM SERPL: 1.41
B-HCG SERPL-ACNC: 38.8 IU/ML
COLLECT DATE BLD: NORMAL
COLLECT DATE: NORMAL
FET NASAL BONE LENGTH US.MEAS: NORMAL MM
FET NUCHAL FOLD MOM THICKNESS US.MEAS: 1.02
FET NUCHAL FOLD THICKNESS US.MEAS: 1.5 MM
FET TS 21 RISK FROM MAT AGE: NORMAL
GA (DAYS): 1 D
GA (WEEKS): 13 WK
GA METHOD: NORMAL
GEST. AGE (DAYS) 2ND SAMPLE (SS2): 1
GEST. AGE (WKS) 2ND SAMPLE (SS2): 16
IDDM PATIENT QL: NORMAL
INHIBIN A MOM SERPL: 2.41
INHIBIN A SERPL-MCNC: 309.5 PG/ML
INTEGRATED SCN PATIENT-IMP: NEGATIVE
PAPP-A MOM SERPL: 0.9
PAPP-A SERPL-MCNC: 579.7 NG/ML
SEQUENTIAL SCREEN PART 2 INTERP: NORMAL
TS 18 RISK FETUS: NORMAL
TS 21 RISK FETUS: NORMAL
U ESTRIOL MOM SERPL: 0.92
U ESTRIOL SERPL-MCNC: 0.72 NG/ML

## 2020-02-03 ENCOUNTER — PATIENT MESSAGE (OUTPATIENT)
Dept: OBSTETRICS AND GYNECOLOGY | Facility: CLINIC | Age: 30
End: 2020-02-03

## 2020-02-04 ENCOUNTER — PATIENT MESSAGE (OUTPATIENT)
Dept: OBSTETRICS AND GYNECOLOGY | Facility: CLINIC | Age: 30
End: 2020-02-04

## 2020-02-20 ENCOUNTER — ROUTINE PRENATAL (OUTPATIENT)
Dept: OBSTETRICS AND GYNECOLOGY | Facility: CLINIC | Age: 30
End: 2020-02-20
Payer: MEDICAID

## 2020-02-20 ENCOUNTER — PROCEDURE VISIT (OUTPATIENT)
Dept: OBSTETRICS AND GYNECOLOGY | Facility: CLINIC | Age: 30
End: 2020-02-20
Payer: MEDICAID

## 2020-02-20 VITALS — SYSTOLIC BLOOD PRESSURE: 102 MMHG | WEIGHT: 239 LBS | DIASTOLIC BLOOD PRESSURE: 60 MMHG | BODY MASS INDEX: 45.15 KG/M2

## 2020-02-20 DIAGNOSIS — Z3A.15 15 WEEKS GESTATION OF PREGNANCY: ICD-10-CM

## 2020-02-20 DIAGNOSIS — Z3A.19 19 WEEKS GESTATION OF PREGNANCY: ICD-10-CM

## 2020-02-20 DIAGNOSIS — Z34.82 ENCOUNTER FOR SUPERVISION OF OTHER NORMAL PREGNANCY IN SECOND TRIMESTER: Primary | ICD-10-CM

## 2020-02-20 DIAGNOSIS — Z34.82 ENCOUNTER FOR SUPERVISION OF OTHER NORMAL PREGNANCY IN SECOND TRIMESTER: ICD-10-CM

## 2020-02-20 PROCEDURE — 99213 PR OFFICE/OUTPT VISIT, EST, LEVL III, 20-29 MIN: ICD-10-PCS | Mod: TH,S$PBB,, | Performed by: OBSTETRICS & GYNECOLOGY

## 2020-02-20 PROCEDURE — 99499 UNLISTED E&M SERVICE: CPT | Mod: S$PBB,,, | Performed by: PEDIATRICS

## 2020-02-20 PROCEDURE — 76805 OB US >/= 14 WKS SNGL FETUS: CPT | Mod: 26,S$PBB,, | Performed by: PEDIATRICS

## 2020-02-20 PROCEDURE — 99499 NO LOS: ICD-10-PCS | Mod: S$PBB,,, | Performed by: PEDIATRICS

## 2020-02-20 PROCEDURE — 99213 OFFICE O/P EST LOW 20 MIN: CPT | Mod: TH,S$PBB,, | Performed by: OBSTETRICS & GYNECOLOGY

## 2020-02-20 PROCEDURE — 99999 PR PBB SHADOW E&M-EST. PATIENT-LVL II: CPT | Mod: PBBFAC,,, | Performed by: OBSTETRICS & GYNECOLOGY

## 2020-02-20 PROCEDURE — 76805 OB US >/= 14 WKS SNGL FETUS: CPT | Mod: PBBFAC,PO | Performed by: PEDIATRICS

## 2020-02-20 PROCEDURE — 76805 PR US, OB 14+WKS, TRANSABD, SINGLE GESTATION: ICD-10-PCS | Mod: 26,S$PBB,, | Performed by: PEDIATRICS

## 2020-02-20 PROCEDURE — 99212 OFFICE O/P EST SF 10 MIN: CPT | Mod: PBBFAC,TH,PO,25 | Performed by: OBSTETRICS & GYNECOLOGY

## 2020-02-20 PROCEDURE — 99999 PR PBB SHADOW E&M-EST. PATIENT-LVL II: ICD-10-PCS | Mod: PBBFAC,,, | Performed by: OBSTETRICS & GYNECOLOGY

## 2020-02-20 NOTE — PROGRESS NOTES
Doing well.    28 yo  female @ 19.5 wks (EDC 2020) who presents for f/u OB visit.    1) SIUP (it's a boy)  -s/p normal anatomy   -Rh positive  -pap wnl  -genetic testing: neg  -consents for vag/blood/circ signed 2020    F/u in 5 wks, 1 hr GTT    margarito ordaz MD

## 2020-04-27 ENCOUNTER — TELEPHONE (OUTPATIENT)
Dept: OBSTETRICS AND GYNECOLOGY | Facility: CLINIC | Age: 30
End: 2020-04-27

## 2020-04-27 ENCOUNTER — PATIENT MESSAGE (OUTPATIENT)
Dept: OBSTETRICS AND GYNECOLOGY | Facility: CLINIC | Age: 30
End: 2020-04-27

## 2020-04-27 NOTE — TELEPHONE ENCOUNTER
Called pt back and asked if she could come in tomorrow at 1015 a.m pt confirmed. If pain gets worst head to L&D- per sushma. Pt verbalized understanding.

## 2020-04-28 ENCOUNTER — LAB VISIT (OUTPATIENT)
Dept: LAB | Facility: HOSPITAL | Age: 30
End: 2020-04-28
Attending: OBSTETRICS & GYNECOLOGY
Payer: MEDICAID

## 2020-04-28 ENCOUNTER — ROUTINE PRENATAL (OUTPATIENT)
Dept: OBSTETRICS AND GYNECOLOGY | Facility: CLINIC | Age: 30
End: 2020-04-28
Payer: MEDICAID

## 2020-04-28 ENCOUNTER — TELEPHONE (OUTPATIENT)
Dept: OBSTETRICS AND GYNECOLOGY | Facility: CLINIC | Age: 30
End: 2020-04-28

## 2020-04-28 VITALS — SYSTOLIC BLOOD PRESSURE: 108 MMHG | DIASTOLIC BLOOD PRESSURE: 80 MMHG | BODY MASS INDEX: 45.4 KG/M2 | WEIGHT: 240.31 LBS

## 2020-04-28 DIAGNOSIS — Z34.83 ENCOUNTER FOR SUPERVISION OF OTHER NORMAL PREGNANCY IN THIRD TRIMESTER: ICD-10-CM

## 2020-04-28 DIAGNOSIS — O26.843 SIZE OF FETUS INCONSISTENT WITH DATES IN THIRD TRIMESTER: ICD-10-CM

## 2020-04-28 DIAGNOSIS — Z3A.29 29 WEEKS GESTATION OF PREGNANCY: ICD-10-CM

## 2020-04-28 DIAGNOSIS — Z34.83 ENCOUNTER FOR SUPERVISION OF OTHER NORMAL PREGNANCY IN THIRD TRIMESTER: Primary | ICD-10-CM

## 2020-04-28 LAB
BASOPHILS # BLD AUTO: 0.02 K/UL (ref 0–0.2)
BASOPHILS NFR BLD: 0.2 % (ref 0–1.9)
DIFFERENTIAL METHOD: ABNORMAL
EOSINOPHIL # BLD AUTO: 0.1 K/UL (ref 0–0.5)
EOSINOPHIL NFR BLD: 0.8 % (ref 0–8)
ERYTHROCYTE [DISTWIDTH] IN BLOOD BY AUTOMATED COUNT: 12.9 % (ref 11.5–14.5)
GLUCOSE SERPL-MCNC: 126 MG/DL (ref 70–140)
HCT VFR BLD AUTO: 30.3 % (ref 37–48.5)
HGB BLD-MCNC: 10.2 G/DL (ref 12–16)
IMM GRANULOCYTES # BLD AUTO: 0.03 K/UL (ref 0–0.04)
IMM GRANULOCYTES NFR BLD AUTO: 0.4 % (ref 0–0.5)
LYMPHOCYTES # BLD AUTO: 2.2 K/UL (ref 1–4.8)
LYMPHOCYTES NFR BLD: 26.1 % (ref 18–48)
MCH RBC QN AUTO: 28.4 PG (ref 27–31)
MCHC RBC AUTO-ENTMCNC: 33.7 G/DL (ref 32–36)
MCV RBC AUTO: 84 FL (ref 82–98)
MONOCYTES # BLD AUTO: 0.5 K/UL (ref 0.3–1)
MONOCYTES NFR BLD: 5.4 % (ref 4–15)
NEUTROPHILS # BLD AUTO: 5.8 K/UL (ref 1.8–7.7)
NEUTROPHILS NFR BLD: 67.1 % (ref 38–73)
NRBC BLD-RTO: 0 /100 WBC
PLATELET # BLD AUTO: 225 K/UL (ref 150–350)
PMV BLD AUTO: 11.9 FL (ref 9.2–12.9)
RBC # BLD AUTO: 3.59 M/UL (ref 4–5.4)
WBC # BLD AUTO: 8.57 K/UL (ref 3.9–12.7)

## 2020-04-28 PROCEDURE — 36415 COLL VENOUS BLD VENIPUNCTURE: CPT

## 2020-04-28 PROCEDURE — 99999 PR PBB SHADOW E&M-EST. PATIENT-LVL III: CPT | Mod: PBBFAC,,, | Performed by: OBSTETRICS & GYNECOLOGY

## 2020-04-28 PROCEDURE — 99213 OFFICE O/P EST LOW 20 MIN: CPT | Mod: PBBFAC,TH,PO | Performed by: OBSTETRICS & GYNECOLOGY

## 2020-04-28 PROCEDURE — 85025 COMPLETE CBC W/AUTO DIFF WBC: CPT

## 2020-04-28 PROCEDURE — 99213 OFFICE O/P EST LOW 20 MIN: CPT | Mod: TH,S$PBB,, | Performed by: OBSTETRICS & GYNECOLOGY

## 2020-04-28 PROCEDURE — 99999 PR PBB SHADOW E&M-EST. PATIENT-LVL III: ICD-10-PCS | Mod: PBBFAC,,, | Performed by: OBSTETRICS & GYNECOLOGY

## 2020-04-28 PROCEDURE — 99213 PR OFFICE/OUTPT VISIT, EST, LEVL III, 20-29 MIN: ICD-10-PCS | Mod: TH,S$PBB,, | Performed by: OBSTETRICS & GYNECOLOGY

## 2020-04-28 PROCEDURE — 82950 GLUCOSE TEST: CPT

## 2020-04-28 NOTE — PATIENT INSTRUCTIONS
Constipation  Dulcolax  Senna  Senokot  Metamucil  Enema  Suppository  Stay well hydrated  Fiber (vegetables)

## 2020-04-28 NOTE — LETTER
[facility]  [address], [place] [address]  Phone: [phone] April 28, 2020     Patient: Brannon Lei   YOB: 1990   Date of Visit: 4/28/2020       To Whom It May Concern:    My patient is currently pregnant with a due date of July 11, 2020.  She is concerned about Covid-19 exposure and possible harm to her health and her pregnancy if she contracts this virus.  She desires to refrain from going to work for the duration of her pregnancy.    Thank you in advance for working with me and my patient to ensure a safe, healthy pregnancy.    If you have any questions or concerns, please don't hesitate to contact my office.    Sincerely,        Kaila Cuevas MD

## 2020-04-28 NOTE — PROGRESS NOTES
Inform patient that her 1 hr glucose test was normal  Your blood count also looks ok at 10.2 (not low enough to case dizziness).    Dr ordaz

## 2020-04-28 NOTE — TELEPHONE ENCOUNTER
----- Message from Kaila Cuevas MD sent at 4/28/2020  2:25 PM CDT -----  Inform patient that her 1 hr glucose test was normal  Your blood count also looks ok at 10.2 (not low enough to case dizziness).    Dr cuevas

## 2020-04-28 NOTE — PROGRESS NOTES
Great fetal movement.  Dizziness. Constipation. Ctxs.  Size>>>dates (growth scan ordered)    30 yo  female @ 29.3 wks (EDC 2020) who presents for f/u OB visit.    1) SIUP (it's a boy - Cj)  -s/p normal anatomy   -Rh positive  -pap wnl  -genetic testing: neg  -consents for vag/blood/circ signed 2020  - 1 hr GTT today    2) PP  Contraception:undecided  -provided with info about tdap    F/u in 3 wks, growth scan    margarito ordaz MD

## 2020-05-22 ENCOUNTER — PROCEDURE VISIT (OUTPATIENT)
Dept: OBSTETRICS AND GYNECOLOGY | Facility: CLINIC | Age: 30
End: 2020-05-22
Payer: MEDICAID

## 2020-05-22 ENCOUNTER — ROUTINE PRENATAL (OUTPATIENT)
Dept: OBSTETRICS AND GYNECOLOGY | Facility: CLINIC | Age: 30
End: 2020-05-22
Payer: MEDICAID

## 2020-05-22 VITALS
SYSTOLIC BLOOD PRESSURE: 110 MMHG | DIASTOLIC BLOOD PRESSURE: 78 MMHG | BODY MASS INDEX: 46.82 KG/M2 | WEIGHT: 247.81 LBS

## 2020-05-22 DIAGNOSIS — Z34.83 ENCOUNTER FOR SUPERVISION OF OTHER NORMAL PREGNANCY IN THIRD TRIMESTER: Primary | ICD-10-CM

## 2020-05-22 DIAGNOSIS — O26.843 SIZE OF FETUS INCONSISTENT WITH DATES IN THIRD TRIMESTER: ICD-10-CM

## 2020-05-22 DIAGNOSIS — Z3A.32 32 WEEKS GESTATION OF PREGNANCY: ICD-10-CM

## 2020-05-22 PROCEDURE — 99213 OFFICE O/P EST LOW 20 MIN: CPT | Mod: TH,S$PBB,, | Performed by: OBSTETRICS & GYNECOLOGY

## 2020-05-22 PROCEDURE — 99999 PR PBB SHADOW E&M-EST. PATIENT-LVL II: ICD-10-PCS | Mod: PBBFAC,,, | Performed by: OBSTETRICS & GYNECOLOGY

## 2020-05-22 PROCEDURE — 76816 OB US FOLLOW-UP PER FETUS: CPT | Mod: PBBFAC,PO | Performed by: PEDIATRICS

## 2020-05-22 PROCEDURE — 99213 PR OFFICE/OUTPT VISIT, EST, LEVL III, 20-29 MIN: ICD-10-PCS | Mod: TH,S$PBB,, | Performed by: OBSTETRICS & GYNECOLOGY

## 2020-05-22 PROCEDURE — 99499 UNLISTED E&M SERVICE: CPT | Mod: S$PBB,,, | Performed by: PEDIATRICS

## 2020-05-22 PROCEDURE — 99499 NO LOS: ICD-10-PCS | Mod: S$PBB,,, | Performed by: PEDIATRICS

## 2020-05-22 PROCEDURE — 99212 OFFICE O/P EST SF 10 MIN: CPT | Mod: PBBFAC,TH,PO,25 | Performed by: OBSTETRICS & GYNECOLOGY

## 2020-05-22 PROCEDURE — 87086 URINE CULTURE/COLONY COUNT: CPT

## 2020-05-22 PROCEDURE — 76816 PR  US,PREGNANT UTERUS,F/U,TRANSABD APP: ICD-10-PCS | Mod: 26,S$PBB,, | Performed by: PEDIATRICS

## 2020-05-22 PROCEDURE — 76816 OB US FOLLOW-UP PER FETUS: CPT | Mod: 26,S$PBB,, | Performed by: PEDIATRICS

## 2020-05-22 PROCEDURE — 99999 PR PBB SHADOW E&M-EST. PATIENT-LVL II: CPT | Mod: PBBFAC,,, | Performed by: OBSTETRICS & GYNECOLOGY

## 2020-05-22 NOTE — PROGRESS NOTES
Great fetal movement. No OB complaints today.    30 yo  female @ 32.6 wks (EDC 2020) who presents for f/u OB visit.    1) SIUP (it's a boy - Fiorradhalilli)  -s/p normal anatomy   -Rh positive  -pap wnl  -genetic testing: neg  -consents for vag/blood/circ signed 2020  - 1 hr GTT wnl    2) PP  Contraception:unsure  Declines tdap  RNI    F/u in 3 wks, 3rd trimester labs    margarito ordaz MD

## 2020-05-24 LAB
BACTERIA UR CULT: NORMAL
BACTERIA UR CULT: NORMAL

## 2020-06-10 ENCOUNTER — PROCEDURE VISIT (OUTPATIENT)
Dept: OBSTETRICS AND GYNECOLOGY | Facility: CLINIC | Age: 30
End: 2020-06-10
Payer: MEDICAID

## 2020-06-10 ENCOUNTER — LAB VISIT (OUTPATIENT)
Dept: LAB | Facility: HOSPITAL | Age: 30
End: 2020-06-10
Attending: OBSTETRICS & GYNECOLOGY
Payer: MEDICAID

## 2020-06-10 ENCOUNTER — ROUTINE PRENATAL (OUTPATIENT)
Dept: OBSTETRICS AND GYNECOLOGY | Facility: CLINIC | Age: 30
End: 2020-06-10
Payer: MEDICAID

## 2020-06-10 VITALS
WEIGHT: 255.75 LBS | DIASTOLIC BLOOD PRESSURE: 78 MMHG | SYSTOLIC BLOOD PRESSURE: 102 MMHG | BODY MASS INDEX: 48.32 KG/M2

## 2020-06-10 DIAGNOSIS — O26.843 SIGNIFICANT DISCREPANCY BETWEEN UTERINE SIZE AND CLINICAL DATES, ANTEPARTUM, THIRD TRIMESTER: ICD-10-CM

## 2020-06-10 DIAGNOSIS — Z3A.35 35 WEEKS GESTATION OF PREGNANCY: ICD-10-CM

## 2020-06-10 DIAGNOSIS — Z34.83 ENCOUNTER FOR SUPERVISION OF OTHER NORMAL PREGNANCY IN THIRD TRIMESTER: Primary | ICD-10-CM

## 2020-06-10 DIAGNOSIS — Z34.83 ENCOUNTER FOR SUPERVISION OF OTHER NORMAL PREGNANCY IN THIRD TRIMESTER: ICD-10-CM

## 2020-06-10 LAB
BASOPHILS # BLD AUTO: 0.02 K/UL (ref 0–0.2)
BASOPHILS NFR BLD: 0.3 % (ref 0–1.9)
DIFFERENTIAL METHOD: ABNORMAL
EOSINOPHIL # BLD AUTO: 0.1 K/UL (ref 0–0.5)
EOSINOPHIL NFR BLD: 1 % (ref 0–8)
ERYTHROCYTE [DISTWIDTH] IN BLOOD BY AUTOMATED COUNT: 13.6 % (ref 11.5–14.5)
HCT VFR BLD AUTO: 32.5 % (ref 37–48.5)
HGB BLD-MCNC: 10.5 G/DL (ref 12–16)
IMM GRANULOCYTES # BLD AUTO: 0.02 K/UL (ref 0–0.04)
IMM GRANULOCYTES NFR BLD AUTO: 0.3 % (ref 0–0.5)
LYMPHOCYTES # BLD AUTO: 2.5 K/UL (ref 1–4.8)
LYMPHOCYTES NFR BLD: 32 % (ref 18–48)
MCH RBC QN AUTO: 26.4 PG (ref 27–31)
MCHC RBC AUTO-ENTMCNC: 32.3 G/DL (ref 32–36)
MCV RBC AUTO: 82 FL (ref 82–98)
MONOCYTES # BLD AUTO: 0.7 K/UL (ref 0.3–1)
MONOCYTES NFR BLD: 8.6 % (ref 4–15)
NEUTROPHILS # BLD AUTO: 4.5 K/UL (ref 1.8–7.7)
NEUTROPHILS NFR BLD: 57.8 % (ref 38–73)
NRBC BLD-RTO: 0 /100 WBC
PLATELET # BLD AUTO: 183 K/UL (ref 150–350)
PMV BLD AUTO: 12.5 FL (ref 9.2–12.9)
RBC # BLD AUTO: 3.98 M/UL (ref 4–5.4)
WBC # BLD AUTO: 7.75 K/UL (ref 3.9–12.7)

## 2020-06-10 PROCEDURE — 99213 OFFICE O/P EST LOW 20 MIN: CPT | Mod: TH,S$PBB,, | Performed by: OBSTETRICS & GYNECOLOGY

## 2020-06-10 PROCEDURE — 87491 CHLMYD TRACH DNA AMP PROBE: CPT

## 2020-06-10 PROCEDURE — 87081 CULTURE SCREEN ONLY: CPT

## 2020-06-10 PROCEDURE — 99999 PR PBB SHADOW E&M-EST. PATIENT-LVL II: ICD-10-PCS | Mod: PBBFAC,,, | Performed by: OBSTETRICS & GYNECOLOGY

## 2020-06-10 PROCEDURE — 99212 OFFICE O/P EST SF 10 MIN: CPT | Mod: PBBFAC,TH,PO | Performed by: OBSTETRICS & GYNECOLOGY

## 2020-06-10 PROCEDURE — 86592 SYPHILIS TEST NON-TREP QUAL: CPT

## 2020-06-10 PROCEDURE — 85025 COMPLETE CBC W/AUTO DIFF WBC: CPT

## 2020-06-10 PROCEDURE — 99999 PR PBB SHADOW E&M-EST. PATIENT-LVL II: CPT | Mod: PBBFAC,,, | Performed by: OBSTETRICS & GYNECOLOGY

## 2020-06-10 PROCEDURE — 99213 PR OFFICE/OUTPT VISIT, EST, LEVL III, 20-29 MIN: ICD-10-PCS | Mod: TH,S$PBB,, | Performed by: OBSTETRICS & GYNECOLOGY

## 2020-06-10 PROCEDURE — 86703 HIV-1/HIV-2 1 RESULT ANTBDY: CPT

## 2020-06-10 PROCEDURE — 36415 COLL VENOUS BLD VENIPUNCTURE: CPT

## 2020-06-10 NOTE — PROGRESS NOTES
Great fetal movement. No OB complaints today. Cervix closed.  Size>>>dates, growth scan ordered    28 yo  female @ 35.4 wks (EDC 2020) who presents for f/u OB visit.    1) SIUP (it's a boy - Cj)  -s/p normal anatomy   -Rh positive  -pap wnl  -genetic testing: neg  -consents for vag/blood/circ signed 2020  - 1 hr GTT wnl  -3rd trimester labs today    2) PP  Contraception:unsure  Declines tdap  RNI    F/u in 2 wks OB visit, growth scan  margarito ordaz MD

## 2020-06-11 LAB
HIV 1+2 AB+HIV1 P24 AG SERPL QL IA: NEGATIVE
RPR SER QL: NORMAL

## 2020-06-13 LAB
BACTERIA SPEC AEROBE CULT: NORMAL
C TRACH DNA SPEC QL NAA+PROBE: NOT DETECTED
N GONORRHOEA DNA SPEC QL NAA+PROBE: NOT DETECTED

## 2020-06-22 ENCOUNTER — PROCEDURE VISIT (OUTPATIENT)
Dept: OBSTETRICS AND GYNECOLOGY | Facility: CLINIC | Age: 30
End: 2020-06-22
Payer: MEDICAID

## 2020-06-22 ENCOUNTER — ROUTINE PRENATAL (OUTPATIENT)
Dept: OBSTETRICS AND GYNECOLOGY | Facility: CLINIC | Age: 30
End: 2020-06-22
Payer: MEDICAID

## 2020-06-22 VITALS — BODY MASS INDEX: 49.2 KG/M2 | SYSTOLIC BLOOD PRESSURE: 110 MMHG | DIASTOLIC BLOOD PRESSURE: 72 MMHG | WEIGHT: 260.38 LBS

## 2020-06-22 DIAGNOSIS — Z34.83 ENCOUNTER FOR SUPERVISION OF OTHER NORMAL PREGNANCY IN THIRD TRIMESTER: Primary | ICD-10-CM

## 2020-06-22 DIAGNOSIS — Z34.83 ENCOUNTER FOR SUPERVISION OF OTHER NORMAL PREGNANCY IN THIRD TRIMESTER: ICD-10-CM

## 2020-06-22 DIAGNOSIS — Z3A.37 37 WEEKS GESTATION OF PREGNANCY: ICD-10-CM

## 2020-06-22 PROCEDURE — 99999 PR PBB SHADOW E&M-EST. PATIENT-LVL II: CPT | Mod: PBBFAC,,, | Performed by: OBSTETRICS & GYNECOLOGY

## 2020-06-22 PROCEDURE — 76816 PR  US,PREGNANT UTERUS,F/U,TRANSABD APP: ICD-10-PCS | Mod: 26,S$PBB,, | Performed by: PEDIATRICS

## 2020-06-22 PROCEDURE — 99213 OFFICE O/P EST LOW 20 MIN: CPT | Mod: TH,S$PBB,, | Performed by: OBSTETRICS & GYNECOLOGY

## 2020-06-22 PROCEDURE — 76816 OB US FOLLOW-UP PER FETUS: CPT | Mod: 26,S$PBB,, | Performed by: PEDIATRICS

## 2020-06-22 PROCEDURE — 99999 PR PBB SHADOW E&M-EST. PATIENT-LVL II: ICD-10-PCS | Mod: PBBFAC,,, | Performed by: OBSTETRICS & GYNECOLOGY

## 2020-06-22 PROCEDURE — 76816 OB US FOLLOW-UP PER FETUS: CPT | Mod: PBBFAC,PO | Performed by: PEDIATRICS

## 2020-06-22 PROCEDURE — 99212 OFFICE O/P EST SF 10 MIN: CPT | Mod: PBBFAC,TH,PO | Performed by: OBSTETRICS & GYNECOLOGY

## 2020-06-22 PROCEDURE — 99213 PR OFFICE/OUTPT VISIT, EST, LEVL III, 20-29 MIN: ICD-10-PCS | Mod: TH,S$PBB,, | Performed by: OBSTETRICS & GYNECOLOGY

## 2020-06-22 NOTE — PROGRESS NOTES
Great fetal movement. No OB complaints today. Cervix 1.5cm dilated  Vertex  EFW 31% on 2020    30 yo  female @ 37.2 wks (EDC 2020) who presents for f/u OB visit.    1) SIUP (it's a boy - Cj)  -s/p normal anatomy   -Rh positive  -pap wnl  -genetic testing: neg  -consents for vag/blood/circ signed 2020  - 1 hr GTT wnl  -gbs neg    2) PP  Contraception:unsure, considering ortho evra patch  Declines tdap  RNI    F/u in 2 wks OB visit    margarito ordaz MD

## 2020-06-27 ENCOUNTER — ANESTHESIA EVENT (OUTPATIENT)
Dept: OBSTETRICS AND GYNECOLOGY | Facility: HOSPITAL | Age: 30
End: 2020-06-27
Payer: MEDICAID

## 2020-06-27 ENCOUNTER — ANESTHESIA (OUTPATIENT)
Dept: OBSTETRICS AND GYNECOLOGY | Facility: HOSPITAL | Age: 30
End: 2020-06-27
Payer: MEDICAID

## 2020-06-27 ENCOUNTER — HOSPITAL ENCOUNTER (INPATIENT)
Facility: HOSPITAL | Age: 30
LOS: 4 days | Discharge: HOME | End: 2020-07-01
Attending: OBSTETRICS & GYNECOLOGY | Admitting: OBSTETRICS & GYNECOLOGY
Payer: MEDICAID

## 2020-06-27 DIAGNOSIS — O14.13 PREECLAMPSIA, SEVERE, THIRD TRIMESTER: ICD-10-CM

## 2020-06-27 PROBLEM — R10.9 ABDOMINAL PAIN DURING PREGNANCY: Status: ACTIVE | Noted: 2020-06-27

## 2020-06-27 PROBLEM — O26.899 ABDOMINAL PAIN DURING PREGNANCY: Status: ACTIVE | Noted: 2020-06-27

## 2020-06-27 LAB
ABO + RH BLD: NORMAL
ALBUMIN SERPL BCP-MCNC: 2.2 G/DL (ref 3.5–5.2)
ALBUMIN SERPL BCP-MCNC: 2.2 G/DL (ref 3.5–5.2)
ALP SERPL-CCNC: 193 U/L (ref 55–135)
ALP SERPL-CCNC: 202 U/L (ref 55–135)
ALT SERPL W/O P-5'-P-CCNC: 165 U/L (ref 10–44)
ALT SERPL W/O P-5'-P-CCNC: 48 U/L (ref 10–44)
ANION GAP SERPL CALC-SCNC: 11 MMOL/L (ref 8–16)
ANION GAP SERPL CALC-SCNC: 7 MMOL/L (ref 8–16)
AST SERPL-CCNC: 358 U/L (ref 10–40)
AST SERPL-CCNC: 78 U/L (ref 10–40)
BACTERIA #/AREA URNS HPF: ABNORMAL /HPF
BASOPHILS # BLD AUTO: 0.01 K/UL (ref 0–0.2)
BASOPHILS # BLD AUTO: 0.01 K/UL (ref 0–0.2)
BASOPHILS NFR BLD: 0.1 % (ref 0–1.9)
BASOPHILS NFR BLD: 0.1 % (ref 0–1.9)
BILIRUB SERPL-MCNC: 0.3 MG/DL (ref 0.1–1)
BILIRUB SERPL-MCNC: 1.1 MG/DL (ref 0.1–1)
BILIRUB UR QL STRIP: NEGATIVE
BLD GP AB SCN CELLS X3 SERPL QL: NORMAL
BUN SERPL-MCNC: 10 MG/DL (ref 6–20)
BUN SERPL-MCNC: 11 MG/DL (ref 6–20)
CALCIUM SERPL-MCNC: 8.3 MG/DL (ref 8.7–10.5)
CALCIUM SERPL-MCNC: 9 MG/DL (ref 8.7–10.5)
CHLORIDE SERPL-SCNC: 102 MMOL/L (ref 95–110)
CHLORIDE SERPL-SCNC: 105 MMOL/L (ref 95–110)
CLARITY UR: CLEAR
CO2 SERPL-SCNC: 19 MMOL/L (ref 23–29)
CO2 SERPL-SCNC: 22 MMOL/L (ref 23–29)
COLOR UR: YELLOW
CREAT SERPL-MCNC: 0.6 MG/DL (ref 0.5–1.4)
CREAT SERPL-MCNC: 0.7 MG/DL (ref 0.5–1.4)
CREAT UR-MCNC: 111.3 MG/DL (ref 15–325)
DIFFERENTIAL METHOD: ABNORMAL
DIFFERENTIAL METHOD: ABNORMAL
EOSINOPHIL # BLD AUTO: 0 K/UL (ref 0–0.5)
EOSINOPHIL # BLD AUTO: 0 K/UL (ref 0–0.5)
EOSINOPHIL NFR BLD: 0 % (ref 0–8)
EOSINOPHIL NFR BLD: 0 % (ref 0–8)
ERYTHROCYTE [DISTWIDTH] IN BLOOD BY AUTOMATED COUNT: 14.2 % (ref 11.5–14.5)
ERYTHROCYTE [DISTWIDTH] IN BLOOD BY AUTOMATED COUNT: 14.4 % (ref 11.5–14.5)
EST. GFR  (AFRICAN AMERICAN): >60 ML/MIN/1.73 M^2
EST. GFR  (AFRICAN AMERICAN): >60 ML/MIN/1.73 M^2
EST. GFR  (NON AFRICAN AMERICAN): >60 ML/MIN/1.73 M^2
EST. GFR  (NON AFRICAN AMERICAN): >60 ML/MIN/1.73 M^2
GLUCOSE SERPL-MCNC: 84 MG/DL (ref 70–110)
GLUCOSE SERPL-MCNC: 89 MG/DL (ref 70–110)
GLUCOSE UR QL STRIP: NEGATIVE
HAPTOGLOB SERPL-MCNC: 42 MG/DL (ref 30–250)
HCT VFR BLD AUTO: 31.1 % (ref 37–48.5)
HCT VFR BLD AUTO: 31.4 % (ref 37–48.5)
HGB BLD-MCNC: 10.3 G/DL (ref 12–16)
HGB BLD-MCNC: 10.3 G/DL (ref 12–16)
HGB UR QL STRIP: ABNORMAL
HYALINE CASTS #/AREA URNS LPF: 0 /LPF
IMM GRANULOCYTES # BLD AUTO: 0.05 K/UL (ref 0–0.04)
IMM GRANULOCYTES # BLD AUTO: 0.06 K/UL (ref 0–0.04)
IMM GRANULOCYTES NFR BLD AUTO: 0.4 % (ref 0–0.5)
IMM GRANULOCYTES NFR BLD AUTO: 0.5 % (ref 0–0.5)
KETONES UR QL STRIP: NEGATIVE
LEUKOCYTE ESTERASE UR QL STRIP: NEGATIVE
LYMPHOCYTES # BLD AUTO: 1.4 K/UL (ref 1–4.8)
LYMPHOCYTES # BLD AUTO: 1.7 K/UL (ref 1–4.8)
LYMPHOCYTES NFR BLD: 10.9 % (ref 18–48)
LYMPHOCYTES NFR BLD: 14.6 % (ref 18–48)
MAGNESIUM SERPL-MCNC: 1.4 MG/DL (ref 1.6–2.6)
MCH RBC QN AUTO: 26.1 PG (ref 27–31)
MCH RBC QN AUTO: 26.3 PG (ref 27–31)
MCHC RBC AUTO-ENTMCNC: 32.8 G/DL (ref 32–36)
MCHC RBC AUTO-ENTMCNC: 33.1 G/DL (ref 32–36)
MCV RBC AUTO: 80 FL (ref 82–98)
MCV RBC AUTO: 80 FL (ref 82–98)
MICROSCOPIC COMMENT: ABNORMAL
MONOCYTES # BLD AUTO: 0.6 K/UL (ref 0.3–1)
MONOCYTES # BLD AUTO: 1 K/UL (ref 0.3–1)
MONOCYTES NFR BLD: 5 % (ref 4–15)
MONOCYTES NFR BLD: 7.6 % (ref 4–15)
NEUTROPHILS # BLD AUTO: 10.7 K/UL (ref 1.8–7.7)
NEUTROPHILS # BLD AUTO: 9.4 K/UL (ref 1.8–7.7)
NEUTROPHILS NFR BLD: 79.9 % (ref 38–73)
NEUTROPHILS NFR BLD: 80.9 % (ref 38–73)
NITRITE UR QL STRIP: NEGATIVE
NRBC BLD-RTO: 0 /100 WBC
NRBC BLD-RTO: 0 /100 WBC
PH UR STRIP: 7 [PH] (ref 5–8)
PLATELET # BLD AUTO: 157 K/UL (ref 150–350)
PLATELET # BLD AUTO: 90 K/UL (ref 150–350)
PLATELET BLD QL SMEAR: ABNORMAL
PMV BLD AUTO: 13.1 FL (ref 9.2–12.9)
PMV BLD AUTO: 13.3 FL (ref 9.2–12.9)
POTASSIUM SERPL-SCNC: 3.7 MMOL/L (ref 3.5–5.1)
POTASSIUM SERPL-SCNC: 3.9 MMOL/L (ref 3.5–5.1)
PROT SERPL-MCNC: 5.9 G/DL (ref 6–8.4)
PROT SERPL-MCNC: 6.1 G/DL (ref 6–8.4)
PROT UR QL STRIP: ABNORMAL
PROT UR-MCNC: 115 MG/DL (ref 0–15)
PROT/CREAT UR: 1.03 MG/G{CREAT} (ref 0–0.2)
RBC # BLD AUTO: 3.91 M/UL (ref 4–5.4)
RBC # BLD AUTO: 3.95 M/UL (ref 4–5.4)
RBC #/AREA URNS HPF: 35 /HPF (ref 0–4)
SODIUM SERPL-SCNC: 132 MMOL/L (ref 136–145)
SODIUM SERPL-SCNC: 134 MMOL/L (ref 136–145)
SP GR UR STRIP: 1.02 (ref 1–1.03)
SQUAMOUS #/AREA URNS HPF: 2 /HPF
URN SPEC COLLECT METH UR: ABNORMAL
UROBILINOGEN UR STRIP-ACNC: NEGATIVE EU/DL
WBC # BLD AUTO: 11.74 K/UL (ref 3.9–12.7)
WBC # BLD AUTO: 13.16 K/UL (ref 3.9–12.7)
WBC #/AREA URNS HPF: 5 /HPF (ref 0–5)

## 2020-06-27 PROCEDURE — 25000003 PHARM REV CODE 250: Performed by: HEALTH CARE PROVIDER

## 2020-06-27 PROCEDURE — 62326 NJX INTERLAMINAR LMBR/SAC: CPT | Performed by: HEALTH CARE PROVIDER

## 2020-06-27 PROCEDURE — 72100002 HC LABOR CARE, 1ST 8 HOURS

## 2020-06-27 PROCEDURE — 63600175 PHARM REV CODE 636 W HCPCS: Performed by: OBSTETRICS & GYNECOLOGY

## 2020-06-27 PROCEDURE — 25000003 PHARM REV CODE 250: Performed by: STUDENT IN AN ORGANIZED HEALTH CARE EDUCATION/TRAINING PROGRAM

## 2020-06-27 PROCEDURE — 72200004 HC VAGINAL DELIVERY LEVEL I

## 2020-06-27 PROCEDURE — 27800516 HC TRAY, EPIDURAL COMBO: Performed by: ANESTHESIOLOGY

## 2020-06-27 PROCEDURE — 84156 ASSAY OF PROTEIN URINE: CPT

## 2020-06-27 PROCEDURE — 36415 COLL VENOUS BLD VENIPUNCTURE: CPT

## 2020-06-27 PROCEDURE — 51702 INSERT TEMP BLADDER CATH: CPT

## 2020-06-27 PROCEDURE — 83735 ASSAY OF MAGNESIUM: CPT

## 2020-06-27 PROCEDURE — 11000001 HC ACUTE MED/SURG PRIVATE ROOM

## 2020-06-27 PROCEDURE — 59409 OBSTETRICAL CARE: CPT | Mod: AT,,, | Performed by: OBSTETRICS & GYNECOLOGY

## 2020-06-27 PROCEDURE — 25000003 PHARM REV CODE 250: Performed by: OBSTETRICS & GYNECOLOGY

## 2020-06-27 PROCEDURE — 85025 COMPLETE CBC W/AUTO DIFF WBC: CPT

## 2020-06-27 PROCEDURE — 86850 RBC ANTIBODY SCREEN: CPT

## 2020-06-27 PROCEDURE — 81000 URINALYSIS NONAUTO W/SCOPE: CPT

## 2020-06-27 PROCEDURE — 27200710 HC EPIDURAL INFUSION PUMP SET: Performed by: ANESTHESIOLOGY

## 2020-06-27 PROCEDURE — 63600175 PHARM REV CODE 636 W HCPCS: Performed by: STUDENT IN AN ORGANIZED HEALTH CARE EDUCATION/TRAINING PROGRAM

## 2020-06-27 PROCEDURE — 83010 ASSAY OF HAPTOGLOBIN QUANT: CPT

## 2020-06-27 PROCEDURE — 59409 PR OBSTETRICAL CARE,VAG DELIV ONLY: ICD-10-PCS | Mod: AT,,, | Performed by: OBSTETRICS & GYNECOLOGY

## 2020-06-27 PROCEDURE — 80053 COMPREHEN METABOLIC PANEL: CPT

## 2020-06-27 RX ORDER — DOCUSATE SODIUM 100 MG/1
200 CAPSULE, LIQUID FILLED ORAL 2 TIMES DAILY PRN
Status: DISCONTINUED | OUTPATIENT
Start: 2020-06-27 | End: 2020-07-01 | Stop reason: HOSPADM

## 2020-06-27 RX ORDER — CYCLOBENZAPRINE HCL 5 MG
10 TABLET ORAL 3 TIMES DAILY PRN
Status: DISCONTINUED | OUTPATIENT
Start: 2020-06-27 | End: 2020-07-01 | Stop reason: HOSPADM

## 2020-06-27 RX ORDER — TOPIRAMATE 25 MG/1
25 TABLET ORAL DAILY
Status: DISCONTINUED | OUTPATIENT
Start: 2020-06-27 | End: 2020-07-01 | Stop reason: HOSPADM

## 2020-06-27 RX ORDER — OXYCODONE AND ACETAMINOPHEN 5; 325 MG/1; MG/1
1 TABLET ORAL EVERY 4 HOURS PRN
Status: DISCONTINUED | OUTPATIENT
Start: 2020-06-27 | End: 2020-07-01 | Stop reason: HOSPADM

## 2020-06-27 RX ORDER — LABETALOL HYDROCHLORIDE 5 MG/ML
20 INJECTION, SOLUTION INTRAVENOUS ONCE AS NEEDED
Status: DISCONTINUED | OUTPATIENT
Start: 2020-06-27 | End: 2020-06-27

## 2020-06-27 RX ORDER — LABETALOL HYDROCHLORIDE 5 MG/ML
40 INJECTION, SOLUTION INTRAVENOUS ONCE AS NEEDED
Status: DISCONTINUED | OUTPATIENT
Start: 2020-06-27 | End: 2020-06-27

## 2020-06-27 RX ORDER — FAMOTIDINE 20 MG/1
20 TABLET, FILM COATED ORAL 2 TIMES DAILY
Status: DISCONTINUED | OUTPATIENT
Start: 2020-06-27 | End: 2020-07-01 | Stop reason: HOSPADM

## 2020-06-27 RX ORDER — LABETALOL HYDROCHLORIDE 5 MG/ML
80 INJECTION, SOLUTION INTRAVENOUS ONCE AS NEEDED
Status: COMPLETED | OUTPATIENT
Start: 2020-06-27 | End: 2020-06-27

## 2020-06-27 RX ORDER — NIFEDIPINE 30 MG/1
30 TABLET, EXTENDED RELEASE ORAL DAILY
Status: DISCONTINUED | OUTPATIENT
Start: 2020-06-27 | End: 2020-06-27

## 2020-06-27 RX ORDER — NIFEDIPINE 30 MG/1
30 TABLET, EXTENDED RELEASE ORAL ONCE
Status: COMPLETED | OUTPATIENT
Start: 2020-06-27 | End: 2020-06-27

## 2020-06-27 RX ORDER — ONDANSETRON 8 MG/1
8 TABLET, ORALLY DISINTEGRATING ORAL EVERY 8 HOURS PRN
Status: DISCONTINUED | OUTPATIENT
Start: 2020-06-27 | End: 2020-07-01 | Stop reason: HOSPADM

## 2020-06-27 RX ORDER — LABETALOL HYDROCHLORIDE 5 MG/ML
INJECTION, SOLUTION INTRAVENOUS
Status: DISPENSED
Start: 2020-06-27 | End: 2020-06-27

## 2020-06-27 RX ORDER — CALCIUM GLUCONATE 98 MG/ML
1 INJECTION, SOLUTION INTRAVENOUS
Status: DISCONTINUED | OUTPATIENT
Start: 2020-06-27 | End: 2020-07-01 | Stop reason: HOSPADM

## 2020-06-27 RX ORDER — SODIUM CHLORIDE, SODIUM LACTATE, POTASSIUM CHLORIDE, CALCIUM CHLORIDE 600; 310; 30; 20 MG/100ML; MG/100ML; MG/100ML; MG/100ML
INJECTION, SOLUTION INTRAVENOUS CONTINUOUS
Status: DISCONTINUED | OUTPATIENT
Start: 2020-06-27 | End: 2020-06-29

## 2020-06-27 RX ORDER — NIFEDIPINE 30 MG/1
30 TABLET, EXTENDED RELEASE ORAL DAILY
Status: DISCONTINUED | OUTPATIENT
Start: 2020-06-28 | End: 2020-06-28

## 2020-06-27 RX ORDER — NIFEDIPINE 10 MG/1
10 CAPSULE ORAL EVERY 6 HOURS PRN
Status: DISCONTINUED | OUTPATIENT
Start: 2020-06-27 | End: 2020-06-27

## 2020-06-27 RX ORDER — HYDRALAZINE HYDROCHLORIDE 20 MG/ML
10 INJECTION INTRAMUSCULAR; INTRAVENOUS ONCE AS NEEDED
Status: COMPLETED | OUTPATIENT
Start: 2020-06-27 | End: 2020-06-27

## 2020-06-27 RX ORDER — FENTANYL/BUPIVACAINE/NS/PF 2MCG/ML-.1
PLASTIC BAG, INJECTION (ML) INJECTION CONTINUOUS PRN
Status: DISCONTINUED | OUTPATIENT
Start: 2020-06-27 | End: 2020-06-29

## 2020-06-27 RX ORDER — LABETALOL HYDROCHLORIDE 5 MG/ML
80 INJECTION, SOLUTION INTRAVENOUS ONCE
Status: COMPLETED | OUTPATIENT
Start: 2020-06-27 | End: 2020-06-27

## 2020-06-27 RX ORDER — NIFEDIPINE 10 MG/1
10 CAPSULE ORAL ONCE
Status: COMPLETED | OUTPATIENT
Start: 2020-06-27 | End: 2020-06-27

## 2020-06-27 RX ORDER — OXYTOCIN/RINGER'S LACTATE 30/500 ML
PLASTIC BAG, INJECTION (ML) INTRAVENOUS
Status: DISPENSED
Start: 2020-06-27 | End: 2020-06-27

## 2020-06-27 RX ORDER — SERTRALINE HYDROCHLORIDE 25 MG/1
50 TABLET, FILM COATED ORAL DAILY
Status: DISCONTINUED | OUTPATIENT
Start: 2020-06-27 | End: 2020-07-01 | Stop reason: HOSPADM

## 2020-06-27 RX ORDER — NIFEDIPINE 10 MG/1
20 CAPSULE ORAL EVERY 8 HOURS
Status: DISCONTINUED | OUTPATIENT
Start: 2020-06-27 | End: 2020-06-27

## 2020-06-27 RX ORDER — NIFEDIPINE 10 MG/1
20 CAPSULE ORAL ONCE
Status: COMPLETED | OUTPATIENT
Start: 2020-06-27 | End: 2020-06-27

## 2020-06-27 RX ORDER — MAGNESIUM SULFATE HEPTAHYDRATE 40 MG/ML
2 INJECTION, SOLUTION INTRAVENOUS CONTINUOUS
Status: DISCONTINUED | OUTPATIENT
Start: 2020-06-27 | End: 2020-06-29

## 2020-06-27 RX ORDER — FAMOTIDINE 10 MG/ML
40 INJECTION INTRAVENOUS DAILY
Status: DISCONTINUED | OUTPATIENT
Start: 2020-06-27 | End: 2020-06-27

## 2020-06-27 RX ORDER — DIPHENHYDRAMINE HCL 25 MG
25 CAPSULE ORAL EVERY 4 HOURS PRN
Status: DISCONTINUED | OUTPATIENT
Start: 2020-06-27 | End: 2020-07-01 | Stop reason: HOSPADM

## 2020-06-27 RX ORDER — LABETALOL HYDROCHLORIDE 5 MG/ML
40 INJECTION, SOLUTION INTRAVENOUS ONCE
Status: COMPLETED | OUTPATIENT
Start: 2020-06-27 | End: 2020-06-27

## 2020-06-27 RX ORDER — HYDROCORTISONE 25 MG/G
CREAM TOPICAL 3 TIMES DAILY PRN
Status: DISCONTINUED | OUTPATIENT
Start: 2020-06-27 | End: 2020-07-01 | Stop reason: HOSPADM

## 2020-06-27 RX ORDER — IBUPROFEN 600 MG/1
600 TABLET ORAL EVERY 6 HOURS PRN
Status: DISCONTINUED | OUTPATIENT
Start: 2020-06-27 | End: 2020-06-27

## 2020-06-27 RX ORDER — HYDROMORPHONE HYDROCHLORIDE 1 MG/ML
1 INJECTION, SOLUTION INTRAMUSCULAR; INTRAVENOUS; SUBCUTANEOUS
Status: DISCONTINUED | OUTPATIENT
Start: 2020-06-27 | End: 2020-07-01 | Stop reason: HOSPADM

## 2020-06-27 RX ORDER — MAGNESIUM SULFATE HEPTAHYDRATE 40 MG/ML
4 INJECTION, SOLUTION INTRAVENOUS ONCE
Status: COMPLETED | OUTPATIENT
Start: 2020-06-27 | End: 2020-06-27

## 2020-06-27 RX ORDER — OXYTOCIN/RINGER'S LACTATE 30/500 ML
334 PLASTIC BAG, INJECTION (ML) INTRAVENOUS ONCE
Status: COMPLETED | OUTPATIENT
Start: 2020-06-27 | End: 2020-06-27

## 2020-06-27 RX ORDER — SIMETHICONE 80 MG
1 TABLET,CHEWABLE ORAL EVERY 6 HOURS PRN
Status: DISCONTINUED | OUTPATIENT
Start: 2020-06-27 | End: 2020-07-01 | Stop reason: HOSPADM

## 2020-06-27 RX ORDER — MISOPROSTOL 200 UG/1
800 TABLET ORAL ONCE
Status: COMPLETED | OUTPATIENT
Start: 2020-06-27 | End: 2020-06-27

## 2020-06-27 RX ORDER — HYDROMORPHONE HYDROCHLORIDE 1 MG/ML
1 INJECTION, SOLUTION INTRAMUSCULAR; INTRAVENOUS; SUBCUTANEOUS ONCE
Status: COMPLETED | OUTPATIENT
Start: 2020-06-27 | End: 2020-06-27

## 2020-06-27 RX ORDER — HYDRALAZINE HYDROCHLORIDE 20 MG/ML
10 INJECTION INTRAMUSCULAR; INTRAVENOUS EVERY 30 MIN PRN
Status: DISCONTINUED | OUTPATIENT
Start: 2020-06-27 | End: 2020-07-01 | Stop reason: HOSPADM

## 2020-06-27 RX ORDER — OXYTOCIN/RINGER'S LACTATE 30/500 ML
95 PLASTIC BAG, INJECTION (ML) INTRAVENOUS ONCE
Status: COMPLETED | OUTPATIENT
Start: 2020-06-27 | End: 2020-06-27

## 2020-06-27 RX ORDER — FENTANYL/BUPIVACAINE/NS/PF 2MCG/ML-.1
PLASTIC BAG, INJECTION (ML) INJECTION
Status: DISPENSED
Start: 2020-06-27 | End: 2020-06-27

## 2020-06-27 RX ORDER — MISOPROSTOL 200 UG/1
TABLET ORAL
Status: DISPENSED
Start: 2020-06-27 | End: 2020-06-27

## 2020-06-27 RX ADMIN — NIFEDIPINE 30 MG: 30 TABLET, FILM COATED, EXTENDED RELEASE ORAL at 09:06

## 2020-06-27 RX ADMIN — HYDRALAZINE HYDROCHLORIDE 10 MG: 20 INJECTION INTRAMUSCULAR; INTRAVENOUS at 09:06

## 2020-06-27 RX ADMIN — LABETALOL HYDROCHLORIDE 80 MG: 5 INJECTION, SOLUTION INTRAVENOUS at 08:06

## 2020-06-27 RX ADMIN — HYDROMORPHONE HYDROCHLORIDE 1 MG: 1 INJECTION, SOLUTION INTRAMUSCULAR; INTRAVENOUS; SUBCUTANEOUS at 10:06

## 2020-06-27 RX ADMIN — HYDROMORPHONE HYDROCHLORIDE 1 MG: 1 INJECTION, SOLUTION INTRAMUSCULAR; INTRAVENOUS; SUBCUTANEOUS at 07:06

## 2020-06-27 RX ADMIN — CYCLOBENZAPRINE HYDROCHLORIDE 10 MG: 5 TABLET, FILM COATED ORAL at 06:06

## 2020-06-27 RX ADMIN — Medication 12 ML/HR: at 03:06

## 2020-06-27 RX ADMIN — TOPIRAMATE 25 MG: 25 TABLET, FILM COATED ORAL at 08:06

## 2020-06-27 RX ADMIN — MAGNESIUM SULFATE HEPTAHYDRATE 2 G/HR: 40 INJECTION, SOLUTION INTRAVENOUS at 08:06

## 2020-06-27 RX ADMIN — NIFEDIPINE 20 MG: 10 CAPSULE ORAL at 01:06

## 2020-06-27 RX ADMIN — LABETALOL HYDROCHLORIDE 40 MG: 5 INJECTION, SOLUTION INTRAVENOUS at 07:06

## 2020-06-27 RX ADMIN — PROMETHAZINE HYDROCHLORIDE 12.5 MG: 25 INJECTION INTRAMUSCULAR; INTRAVENOUS at 08:06

## 2020-06-27 RX ADMIN — OXYCODONE AND ACETAMINOPHEN 1 TABLET: 5; 325 TABLET ORAL at 06:06

## 2020-06-27 RX ADMIN — FAMOTIDINE 20 MG: 20 TABLET ORAL at 08:06

## 2020-06-27 RX ADMIN — MAGNESIUM SULFATE HEPTAHYDRATE 4 G: 40 INJECTION, SOLUTION INTRAVENOUS at 08:06

## 2020-06-27 RX ADMIN — SIMETHICONE CHEW TAB 80 MG 80 MG: 80 TABLET ORAL at 07:06

## 2020-06-27 RX ADMIN — LABETALOL HYDROCHLORIDE 80 MG: 5 INJECTION, SOLUTION INTRAVENOUS at 10:06

## 2020-06-27 RX ADMIN — OXYCODONE AND ACETAMINOPHEN 1 TABLET: 5; 325 TABLET ORAL at 07:06

## 2020-06-27 RX ADMIN — SODIUM CHLORIDE, SODIUM LACTATE, POTASSIUM CHLORIDE, AND CALCIUM CHLORIDE: .6; .31; .03; .02 INJECTION, SOLUTION INTRAVENOUS at 03:06

## 2020-06-27 RX ADMIN — FAMOTIDINE 20 MG: 20 TABLET ORAL at 10:06

## 2020-06-27 RX ADMIN — NIFEDIPINE 30 MG: 30 TABLET, FILM COATED, EXTENDED RELEASE ORAL at 02:06

## 2020-06-27 RX ADMIN — OXYCODONE AND ACETAMINOPHEN 1 TABLET: 5; 325 TABLET ORAL at 11:06

## 2020-06-27 RX ADMIN — SODIUM CHLORIDE, SODIUM LACTATE, POTASSIUM CHLORIDE, AND CALCIUM CHLORIDE: .6; .31; .03; .02 INJECTION, SOLUTION INTRAVENOUS at 04:06

## 2020-06-27 RX ADMIN — NIFEDIPINE 10 MG: 10 CAPSULE ORAL at 11:06

## 2020-06-27 RX ADMIN — IBUPROFEN 600 MG: 600 TABLET, FILM COATED ORAL at 07:06

## 2020-06-27 RX ADMIN — Medication 95 MILLI-UNITS/MIN: at 05:06

## 2020-06-27 RX ADMIN — ONDANSETRON 8 MG: 8 TABLET, ORALLY DISINTEGRATING ORAL at 01:06

## 2020-06-27 RX ADMIN — HYDRALAZINE HYDROCHLORIDE 10 MG: 20 INJECTION INTRAMUSCULAR; INTRAVENOUS at 10:06

## 2020-06-27 RX ADMIN — Medication 334 MILLI-UNITS/MIN: at 04:06

## 2020-06-27 RX ADMIN — MISOPROSTOL 800 MCG: 200 TABLET ORAL at 04:06

## 2020-06-27 NOTE — PROGRESS NOTES
Labs reviewed. HELLP diagnosed. Labs Q6hrs. Discussed with nursing BP control with immediate released procardia 10mg and may need additional procardia 30mg of XL this afternoon and switch to 60mg in AM. Attention to avoid opposite end of spectrum with hypotension with use of multiple agents. Hospital Medicine also consulted for assistance with BP control. Continue to closely monitor UOP. Labs will toni around 24-48hr s/p delivery.  < BR>Dr. Martinez

## 2020-06-27 NOTE — LACTATION NOTE
While checking infant feeding chart, saw 0 br attempts and FF only. Mother  Is sick, poss HELLP synd. Went in to see Mother to discuss desire or ability to pump for baby which was just transferred to NICU. Mother stated she would like to pump. Discussed with Mother's RN AIYANA MYERS    RN busy with lab draws with Patient.   1430 Pump brought to Mother's bedside. RN will call if Lact needed to educate on pump use when pt is avail and ready to pump and/or receive education.   1530 pump set up with instructions. Mother stated her plan was to BR and FF. Mother stated infant went to breast once this am and latched well did 15 min each side.  [employer] MOBILE Symphony pump, tubing, collections containers brought to bedside.  Discussed proper pump setting of initiation phase.  Instructed on proper usage of pump and to adjust suction according to maximum comfort level.  Verified appropriate flange fit.  Educated on the frequency and duration of pumping in order to promote and maintain a full milk supply.  Hands on pumping technique reviewed.  Encouraged hand expression after pumping.  Instructed on cleaning of breast pump parts.  Written instructions also given.  Pt verbalized understanding and appropriate recall for proper milk handling, collection, labeling, storage and transportation.   Mom will continue to pump/hand express at least 8+ times/24 hrs for  nicu baby. Symphony pump at bs. Reviewed use/cleaning. Stressed importance of hand hygiene & keeping pump kit clean. Will collect, label, store & transport EBM as instructed. Will call for any needs.

## 2020-06-27 NOTE — PLAN OF CARE
Lab phoned and notified that the three ldh samples were hemolyzed, I phoned dr mojica, she said its ok.  Dont result it cause it will not be accurate.  BPS rev.  Labs ordered for the am.

## 2020-06-27 NOTE — NURSING
Late entry: 0449: Cytotec 800 mcg administered rectally per Dr. Cuevas prophylactically for precipitous delivery.

## 2020-06-27 NOTE — LACTATION NOTE
[facility]  Lactation Note - Mom    SUMMARY     Maternal Assessment    Breast Size Issue: none  Breast Shape: Bilateral:, round, pendulous  Breast Density: Bilateral:, soft  Areola: Bilateral:, elastic  Nipples: Bilateral:, everted, graspable  Left Nipple Symptoms: other (see comments)(EDNIES)  Right Nipple Symptoms: other (see comments)(DENIES)  Preferred Pain Scale: number (Numeric Rating Pain Scale)  Comfort/Acceptable Pain Level: 5  Pain Body Location: abdomen  Pain Rating (0-10): Rest: 0  Pain Rating (0-10): Activity: 0  Frequency: constant  Quality: aching  Pain Management Interventions: pain management plan reviewed with patient/caregiver  POSS (Pasero Opioid-Induced Sed Scale): 1 - Awake and alert    LATCH Score         Breasts WDL    Breast WDL: WDL  Left Nipple Symptoms: other (see comments)(EDNIES)  Right Nipple Symptoms: other (see comments)(DENIES)    Maternal Infant Feeding    Maternal Preparation: hand hygiene, breast care  Maternal Emotional State: independent, relaxed    Lactation Referrals         Lactation Interventions    Breastfeeding Assistance: electric breast pump used, support offered  Breastfeeding Support: electric breast pump used, encouragement provided, support offered  Breastfeeding Assistance: electric breast pump used, support offered       Breastfeeding Session    Breast Pumping Interventions: early pumping promoted, frequent pumping encouraged    Maternal Information    Person Making Referral: nurse  Maternal Reason for Referral: breastfeeding currently, other (see comments)(baby transfr to NICU)  Infant Reason for Referral: other (see comments)(HYPOGLYCEMIA:NICU )

## 2020-06-27 NOTE — NURSING
Late entry: 0205: 29 year old G 3 P 1 at 38 wks with c/o ctxs q3-4 mins and abdominal pain rating 10/10, pt unable to remain still during ctx from the amount of pain.  History/complications of anxiety/depression,  x1. Deneis vaginal bleeding, denies leaking fluid. Fetus: fetal heart tones 155bpm, moderate variability, + fetal movements. Contractions moderate to touch. Abdomen soft/nontender. Vital signs stable. Cervix: 6.5/80/0. Reviewed plan of care. Educated on electronic fetal monitoring and assessments. Questions answered. Will update Dr. Cuevas.

## 2020-06-27 NOTE — PROGRESS NOTES
06/27/20 1220 06/27/20 1240   Vital Signs   BP (!) 171/90 (!) 189/100   MAP (mmHg) 119 137   Dr. Martinez notified of pt's latest BP's. Instructed to add one time dose of Procadia immediate release 20 mg PO. Recheck a pressure in 15-20 minutes and admin 10 mg Procardia immediate release if needed. WCT.    UOP reported to MD. 55 cc/hr.

## 2020-06-27 NOTE — PLAN OF CARE
pts bp elevated, complains of epigastric pain, sweating, bp elevated, cuff changed, still elevated, will notify dr ordaz   normal

## 2020-06-27 NOTE — ASSESSMENT & PLAN NOTE
-Patient not controlled by labetalol 200mg total today, or PO procardia  -Please give additional 30mg nifedipine   --Give 10mg hydralazine q30 for SBP>160, goal of <160 for up to 8 doses  -Thank you for the consult, family medicine will continue to follow.

## 2020-06-27 NOTE — SUBJECTIVE & OBJECTIVE
Past Medical History:   Diagnosis Date    Mental disorder     anxiety/depression     Migraines        History reviewed. No pertinent surgical history.    Review of patient's allergies indicates:  No Known Allergies    No current facility-administered medications on file prior to encounter.      Current Outpatient Medications on File Prior to Encounter   Medication Sig    acetaminophen (TYLENOL) 500 MG tablet Take 1 tablet (500 mg total) by mouth every 6 (six) hours as needed for Pain. (Patient not taking: Reported on 1/23/2020)    butalbital-acetaminophen-caffeine -40 mg (FIORICET, ESGIC) -40 mg per tablet Take 1 tablet by mouth every 6 (six) hours as needed for Pain (headache). (Patient not taking: Reported on 12/12/2019)    pantoprazole (PROTONIX) 40 MG tablet Take 1 tablet (40 mg total) by mouth once daily. (Patient not taking: Reported on 12/12/2019)    promethazine (PHENERGAN) 25 MG tablet Take 1 tablet (25 mg total) by mouth every 6 (six) hours as needed for Nausea. (Patient not taking: Reported on 12/12/2019)    sertraline (ZOLOFT) 50 MG tablet     topiramate (TOPAMAX) 25 MG tablet      Family History     None        Tobacco Use    Smoking status: Never Smoker   Substance and Sexual Activity    Alcohol use: No    Drug use: No    Sexual activity: Yes     Review of Systems   Constitutional: Negative.    HENT: Negative.    Eyes: Negative.    Respiratory: Negative for apnea, cough, choking, chest tightness, shortness of breath, wheezing and stridor.    Cardiovascular: Positive for leg swelling. Negative for chest pain.        Mild BLE edema   Gastrointestinal: Positive for abdominal distention, nausea and vomiting. Negative for abdominal pain.        Postpartum stomach, uterus firm at umbilicus. 5x emesis episodes today.    Genitourinary: Negative for dysuria and flank pain.        S/P vaginal delivery   Musculoskeletal: Negative for back pain and joint swelling.   Skin: Negative for color  change, pallor, rash and wound.   Neurological: Negative for dizziness, seizures, facial asymmetry, speech difficulty and headaches.   Psychiatric/Behavioral: Negative.      Objective:     Vital Signs (Most Recent):  Temp: 97.5 °F (36.4 °C) (06/27/20 1351)  Pulse: 77 (06/27/20 1359)  Resp: 18 (06/27/20 1050)  BP: (!) 145/91 (06/27/20 1359)  SpO2: 96 % (06/27/20 1359) Vital Signs (24h Range):  Temp:  [97.5 °F (36.4 °C)-97.6 °F (36.4 °C)] 97.5 °F (36.4 °C)  Pulse:  [59-96] 77  Resp:  [18-20] 18  SpO2:  [92 %-100 %] 96 %  BP: (127-191)/() 145/91     Weight: 117.9 kg (260 lb)  Body mass index is 49.13 kg/m².    Physical Exam  Vitals signs and nursing note reviewed.   Constitutional:       General: She is not in acute distress.     Appearance: Normal appearance. She is obese. She is not ill-appearing or diaphoretic.   HENT:      Head: Normocephalic and atraumatic.      Right Ear: External ear normal.      Left Ear: External ear normal.      Nose: Nose normal. No congestion or rhinorrhea.      Mouth/Throat:      Mouth: Mucous membranes are moist.      Pharynx: Oropharynx is clear.   Eyes:      Extraocular Movements: Extraocular movements intact.      Pupils: Pupils are equal, round, and reactive to light.   Neck:      Musculoskeletal: Normal range of motion.   Cardiovascular:      Rate and Rhythm: Normal rate and regular rhythm.      Pulses: Normal pulses.      Heart sounds: Normal heart sounds. No murmur. No friction rub. No gallop.    Pulmonary:      Effort: Pulmonary effort is normal. No respiratory distress.      Breath sounds: Normal breath sounds. No wheezing or rales.   Abdominal:      General: Bowel sounds are normal.      Palpations: Abdomen is soft.      Tenderness: There is no abdominal tenderness. There is no right CVA tenderness, left CVA tenderness or rebound.   Musculoskeletal: Normal range of motion.   Skin:     General: Skin is warm and dry.   Neurological:      General: No focal deficit present.       Mental Status: She is alert and oriented to person, place, and time.   Psychiatric:         Mood and Affect: Mood normal.         Behavior: Behavior normal.         Significant Labs:   CBC:   Recent Labs   Lab 06/27/20  0813 06/27/20  1250   WBC 11.74 13.16*   HGB 10.3* 10.3*   HCT 31.1* 31.4*    90*     CMP:   Recent Labs   Lab 06/27/20  0813 06/27/20  1250   * 132*   K 3.9 3.7    102   CO2 22* 19*   GLU 89 84   BUN 11 10   CREATININE 0.7 0.6   CALCIUM 9.0 8.3*   PROT 6.1 5.9*   ALBUMIN 2.2* 2.2*   BILITOT 0.3 1.1*   ALKPHOS 193* 202*   AST 78* 358*   ALT 48* 165*   ANIONGAP 7* 11   EGFRNONAA >60 >60     All pertinent labs within the past 24 hours have been reviewed.    Significant Imaging: I have reviewed and interpreted all pertinent imaging results/findings within the past 24 hours.

## 2020-06-27 NOTE — NURSING
Late entry: 0243 Dr. Cuevas called to notify her of pts arrival to unit c/o ctxs q3-4mins since midnight, and SVE 6.5/80/0 with BBOW. MD stated to admit pt, get a type and screen and CBC and to recheck cervix after 30 mins.

## 2020-06-27 NOTE — PROGRESS NOTES
06/27/20 0947   Vital Signs   BP (!) 174/95   MAP (mmHg) 126   Dr. Martinez notified of pt's BP. Instructed to give 80 mg Labetalol IVP.

## 2020-06-27 NOTE — PLAN OF CARE
Report given to Dr. Cuevas, elevated bps 180-190/100-110s, pt pale, sweating, epigastric pain, bleeding normal, sats dropped to 89%, back up with deep breathing.  Orders rec for labetalol.

## 2020-06-27 NOTE — NURSING
Results for DÍAZ LUZMARIAMARK (MRN [id number]) as of 6/27/2020 10:56   Ref. Range 6/27/2020 12:50   Platelets Latest Ref Range: 150 - 350 K/uL 90 (L)   Dr. Martinez notified of pt's Platelet count.

## 2020-06-27 NOTE — CONSULTS
[facility] Hospital Medicine  Consult Note    Patient Name: Brannon Lei  MRN: [id number]  Admission Date: 2020  Hospital Length of Stay: 0 days  Attending Physician: Kaila Cuevas MD   Primary Care Provider: Primary Doctor No           Patient information was obtained from patient, past medical records and ER records.     Inpatient consult to Family Practice  Consult performed by: Nisreen Morse MD  Consult ordered by: Keyana Martinez MD  Reason for consult: Blood Pressure Control        Subjective:     Principal Problem: HELLP syndrome, delivered, current hospitalization    Chief Complaint:   Chief Complaint   Patient presents with    Contractions        HPI: Ms. Brannon Lei is a 30 yo female PMH of migraines ,  status post vaginal delivery this AM at 38w0d gestation to baby boy at 4:33am. After delivery, at approximately 7am patient became sweaty and hypertensive. She had elevated bps 180-190/100-110s, appeared pale, and had epigastric pain. Satting 89% and above. SBP continued to spike above 160: Patient recieved IV labetalol 40mg  then 80mg, hydralazine 10mg,  procardia 30XL PO, and IV labetalol 80mg. Patient initiated on magnesium. Labs consistent with HELLP syndrome. Ultrasound of abdomen demonstrates cholelithiasis with gallbladder wall thickening, negative sonographic Ferraro's sign. Hepatic parenchyma appears echogenic, indicative of infiltrative process or steatosis. Patient received dilaudid 1x, and abdominal pain resolved. Family medicine consulted for blood pressure control recommendations.     Patient was calm, NAD on exam. SBP of 155. No SOB, no chest pain. Some nausea and vomiting after delivery. No blurry vision. No abdominal pain to palpation.     Past Medical History:   Diagnosis Date    Mental disorder     anxiety/depression     Migraines        History reviewed. No pertinent surgical history.    Review of patient's allergies indicates:  No  Known Allergies    No current facility-administered medications on file prior to encounter.      Current Outpatient Medications on File Prior to Encounter   Medication Sig    acetaminophen (TYLENOL) 500 MG tablet Take 1 tablet (500 mg total) by mouth every 6 (six) hours as needed for Pain. (Patient not taking: Reported on 1/23/2020)    butalbital-acetaminophen-caffeine -40 mg (FIORICET, ESGIC) -40 mg per tablet Take 1 tablet by mouth every 6 (six) hours as needed for Pain (headache). (Patient not taking: Reported on 12/12/2019)    pantoprazole (PROTONIX) 40 MG tablet Take 1 tablet (40 mg total) by mouth once daily. (Patient not taking: Reported on 12/12/2019)    promethazine (PHENERGAN) 25 MG tablet Take 1 tablet (25 mg total) by mouth every 6 (six) hours as needed for Nausea. (Patient not taking: Reported on 12/12/2019)    sertraline (ZOLOFT) 50 MG tablet     topiramate (TOPAMAX) 25 MG tablet      Family History     None        Tobacco Use    Smoking status: Never Smoker   Substance and Sexual Activity    Alcohol use: No    Drug use: No    Sexual activity: Yes     Review of Systems   Constitutional: Negative.    HENT: Negative.    Eyes: Negative.    Respiratory: Negative for apnea, cough, choking, chest tightness, shortness of breath, wheezing and stridor.    Cardiovascular: Positive for leg swelling. Negative for chest pain.        Mild BLE edema   Gastrointestinal: Positive for abdominal distention, nausea and vomiting. Negative for abdominal pain.        Postpartum stomach, uterus firm at umbilicus. 5x emesis episodes today.    Genitourinary: Negative for dysuria and flank pain.        S/P vaginal delivery   Musculoskeletal: Negative for back pain and joint swelling.   Skin: Negative for color change, pallor, rash and wound.   Neurological: Negative for dizziness, seizures, facial asymmetry, speech difficulty and headaches.   Psychiatric/Behavioral: Negative.      Objective:     Vital Signs  (Most Recent):  Temp: 97.5 °F (36.4 °C) (06/27/20 1351)  Pulse: 77 (06/27/20 1359)  Resp: 18 (06/27/20 1050)  BP: (!) 145/91 (06/27/20 1359)  SpO2: 96 % (06/27/20 1359) Vital Signs (24h Range):  Temp:  [97.5 °F (36.4 °C)-97.6 °F (36.4 °C)] 97.5 °F (36.4 °C)  Pulse:  [59-96] 77  Resp:  [18-20] 18  SpO2:  [92 %-100 %] 96 %  BP: (127-191)/() 145/91     Weight: 117.9 kg (260 lb)  Body mass index is 49.13 kg/m².    Physical Exam  Vitals signs and nursing note reviewed.   Constitutional:       General: She is not in acute distress.     Appearance: Normal appearance. She is obese. She is not ill-appearing or diaphoretic.   HENT:      Head: Normocephalic and atraumatic.      Right Ear: External ear normal.      Left Ear: External ear normal.      Nose: Nose normal. No congestion or rhinorrhea.      Mouth/Throat:      Mouth: Mucous membranes are moist.      Pharynx: Oropharynx is clear.   Eyes:      Extraocular Movements: Extraocular movements intact.      Pupils: Pupils are equal, round, and reactive to light.   Neck:      Musculoskeletal: Normal range of motion.   Cardiovascular:      Rate and Rhythm: Normal rate and regular rhythm.      Pulses: Normal pulses.      Heart sounds: Normal heart sounds. No murmur. No friction rub. No gallop.    Pulmonary:      Effort: Pulmonary effort is normal. No respiratory distress.      Breath sounds: Normal breath sounds. No wheezing or rales.   Abdominal:      General: Bowel sounds are normal.      Palpations: Abdomen is soft.      Tenderness: There is no abdominal tenderness. There is no right CVA tenderness, left CVA tenderness or rebound.   Musculoskeletal: Normal range of motion.   Skin:     General: Skin is warm and dry.   Neurological:      General: No focal deficit present.      Mental Status: She is alert and oriented to person, place, and time.   Psychiatric:         Mood and Affect: Mood normal.         Behavior: Behavior normal.         Significant Labs:   CBC:   Recent  Labs   Lab 06/27/20  0813 06/27/20  1250   WBC 11.74 13.16*   HGB 10.3* 10.3*   HCT 31.1* 31.4*    90*     CMP:   Recent Labs   Lab 06/27/20  0813 06/27/20  1250   * 132*   K 3.9 3.7    102   CO2 22* 19*   GLU 89 84   BUN 11 10   CREATININE 0.7 0.6   CALCIUM 9.0 8.3*   PROT 6.1 5.9*   ALBUMIN 2.2* 2.2*   BILITOT 0.3 1.1*   ALKPHOS 193* 202*   AST 78* 358*   ALT 48* 165*   ANIONGAP 7* 11   EGFRNONAA >60 >60     All pertinent labs within the past 24 hours have been reviewed.    Significant Imaging: I have reviewed and interpreted all pertinent imaging results/findings within the past 24 hours.    Assessment/Plan:     * HELLP syndrome, delivered, current hospitalization  -Patient not controlled by labetalol 200mg total today, or PO procardia  -Please give additional 30mg nifedipine   --Give 10mg hydralazine q30 for SBP>160, goal of <160 for up to 8 doses  -Abdominal pain likely secondary to HELLP syndrome rather than acute cholecystitis  -Thank you for the consult, family medicine will continue to follow.          VTE Risk Mitigation (From admission, onward)         Ordered     Place GEORGIE hose  Until discontinued      06/27/20 0626                    Thank you for your consult. I will follow-up with patient. Please contact us if you have any additional questions.    Nisreen Morse MD   [employer] [facility] PGY-1  Department of Hospital Medicine   [facility]

## 2020-06-27 NOTE — HPI
Ms. Brannon Lei is a 30 yo female PMH of migraines ,  status post vaginal delivery this AM at 38w0d gestation to baby boy at 4:33am. After delivery, at approximately 7am patient became sweaty and hypertensive. She had elevated bps 180-190/100-110s, appeared pale, and had epigastric pain. Satting 89% and above. SBP continued to spike above 160: Patient recieved IV labetalol 40mg  then 80mg, hydralazine 10mg,  procardia 30XL PO, and IV labetalol 80mg. Patient initiated on magnesium. Labs consistent with HELLP syndrome. Ultrasound of abdomen demonstrates cholelithiasis with gallbladder wall thickening, negative sonographic Ferraro's sign. Hepatic parenchyma appears echogenic, indicative of infiltrative process or steatosis. Patient received dilaudid 1x, and abdominal pain resolved. Family medicine consulted for blood pressure control recommendations.     Patient was calm, NAD on exam. SBP of 155. No SOB, no chest pain. Some nausea and vomiting after delivery. No blurry vision. No abdominal pain to palpation.

## 2020-06-27 NOTE — ANESTHESIA PREPROCEDURE EVALUATION
06/27/2020  Brannon Lei is a 29 y.o., female.    Anesthesia Evaluation    I have reviewed the Patient Summary Reports.    I have reviewed the Nursing Notes.    I have reviewed the Medications.     Review of Systems  Anesthesia Hx:  No problems with previous Anesthesia  History of prior surgery of interest to airway management or planning: Denies Family Hx of Anesthesia complications.   Denies Personal Hx of Anesthesia complications.   Hematology/Oncology:  Hematology Normal   Oncology Normal     EENT/Dental:EENT/Dental Normal   Cardiovascular:  Cardiovascular Normal Exercise tolerance: good     Pulmonary:  Pulmonary Normal    Renal/:  Renal/ Normal     Hepatic/GI:  Hepatic/GI Normal    Musculoskeletal:  Musculoskeletal Normal    Neurological:  Neurology Normal    Dermatological:  Skin Normal    Psych:  Psychiatric Normal           Physical Exam  General:  Well nourished    Airway/Jaw/Neck:  Airway Findings: Mouth Opening: Normal Tongue: Normal  General Airway Assessment: Adult  Mallampati: II  Improves to II with phonation.  TM Distance: < 4 cm     Eyes/Ears/Nose:  EYES/EARS/NOSE FINDINGS: Normal   Dental:  Dental Findings: In tact   Chest/Lungs:  Chest/Lungs Clear    Heart/Vascular:  Heart Findings: Normal       Mental Status:  Mental Status Findings: Normal        Anesthesia Plan  Type of Anesthesia, risks & benefits discussed:  Anesthesia Type:  spinal, CSE, epidural  Patient's Preference:   Intra-op Monitoring Plan:   Intra-op Monitoring Plan Comments:   Post Op Pain Control Plan:   Post Op Pain Control Plan Comments:   Induction:   IV  Beta Blocker:  Patient is not currently on a Beta-Blocker (No further documentation required).       Informed Consent: Patient understands risks and agrees with Anesthesia plan.  Questions answered. Anesthesia consent signed with patient.  ASA Score: 2     Day of  Surgery Review of History & Physical:  There are no significant changes.          Ready For Surgery From Anesthesia Perspective.

## 2020-06-27 NOTE — NURSING
Patient report received from DERIK Patel and DERIK Blandon. Patient seen at bedside dozing, but awakens to verbal stimuli. Introduced self to patient and reviewed plan of care. Encouraged patient to pump her breasts 8 or more times in 24 hours or about every 2-3 hours. Patient stated she last pumped at about 3pm; encouraged patient to pump now to maintain supply. Patient c/o 6/10 headache and requesting medication. Communicated to patient she was given flexeril for headache recently. Patient requested additional dose; percocet 5mg given. Assessment performed and patient verbalizes comfort. Call light provided and educated on use. Will continue to assess.

## 2020-06-27 NOTE — ANESTHESIA PROCEDURE NOTES
CSE    Patient location during procedure: OB  Start time: 6/27/2020 3:25 AM  Timeout: 6/27/2020 3:20 AM  End time: 6/27/2020 3:35 AM    Staffing  Authorizing Provider: Chasidy Melvin MD  Performing Provider: Jeremiah Correa MD    Preanesthetic Checklist  Completed: patient identified, site marked, surgical consent, pre-op evaluation, timeout performed, IV checked, risks and benefits discussed and monitors and equipment checked  CSE  Patient position: sitting  Prep: ChloraPrep  Patient monitoring: heart rate, continuous pulse ox and frequent blood pressure checks  Approach: midline  Spinal Needle  Needle type: pencil-tip   Needle gauge: 24 G  Needle length: 3.5 in  Epidural Needle  Injection technique: KOURTNEY saline  Needle type: Tuohy   Needle gauge: 17 G  Needle length: 3.5 in  Needle insertion depth: 8 cm  Location: L3-4  Needle localization: anatomical landmarks  Catheter  Catheter type: springwound  Catheter size: 18 G  Catheter at skin depth: 13 cm  Test dose: lidocaine 1.5% with Epi 1-to-200,000  Additional Documentation: incremental injection, negative aspiration for heme, no paresthesia on injection and negative test dose  Assessment  Sensory level: T8   Dermatomal levels determined by alcohol swab

## 2020-06-27 NOTE — PROGRESS NOTES
Up to bedside for reevaluation. Pt denies headache, blurry vision. Still with 7/10 epigastric pain. CBC wnl but P/C ratio elevated. Still awaiting CMP results to check LFT/cr. Order for abdominal U/S placed. Will give IV med for pain control. Antihypertensive med review for what has been given: IV labetalol 40mg  then 80mg, hydralazine 10mg,  procardia 30XL PO, and IV labetalol 80mg. Will repeat labs in 4hrs. F/u U/S results. UOP- 300cc since cather, 100cc in the last hr. No si/sx of mag toxicity. Continue to monitor

## 2020-06-28 LAB
ALBUMIN SERPL BCP-MCNC: 2.2 G/DL (ref 3.5–5.2)
ALP SERPL-CCNC: 183 U/L (ref 55–135)
ALT SERPL W/O P-5'-P-CCNC: 156 U/L (ref 10–44)
ANION GAP SERPL CALC-SCNC: 8 MMOL/L (ref 8–16)
ANISOCYTOSIS BLD QL SMEAR: SLIGHT
AST SERPL-CCNC: 290 U/L (ref 10–40)
BASOPHILS # BLD AUTO: 0.02 K/UL (ref 0–0.2)
BASOPHILS NFR BLD: 0.2 % (ref 0–1.9)
BILIRUB SERPL-MCNC: 2.1 MG/DL (ref 0.1–1)
BUN SERPL-MCNC: 10 MG/DL (ref 6–20)
CALCIUM SERPL-MCNC: 7.4 MG/DL (ref 8.7–10.5)
CHLORIDE SERPL-SCNC: 100 MMOL/L (ref 95–110)
CO2 SERPL-SCNC: 24 MMOL/L (ref 23–29)
CREAT SERPL-MCNC: 0.6 MG/DL (ref 0.5–1.4)
DIFFERENTIAL METHOD: ABNORMAL
EOSINOPHIL # BLD AUTO: 0 K/UL (ref 0–0.5)
EOSINOPHIL NFR BLD: 0.3 % (ref 0–8)
ERYTHROCYTE [DISTWIDTH] IN BLOOD BY AUTOMATED COUNT: 15.5 % (ref 11.5–14.5)
EST. GFR  (AFRICAN AMERICAN): >60 ML/MIN/1.73 M^2
EST. GFR  (NON AFRICAN AMERICAN): >60 ML/MIN/1.73 M^2
GLUCOSE SERPL-MCNC: 97 MG/DL (ref 70–110)
HCT VFR BLD AUTO: 30 % (ref 37–48.5)
HGB BLD-MCNC: 10.1 G/DL (ref 12–16)
IMM GRANULOCYTES # BLD AUTO: 0.09 K/UL (ref 0–0.04)
IMM GRANULOCYTES NFR BLD AUTO: 0.9 % (ref 0–0.5)
LYMPHOCYTES # BLD AUTO: 1.7 K/UL (ref 1–4.8)
LYMPHOCYTES NFR BLD: 15.8 % (ref 18–48)
MCH RBC QN AUTO: 26.4 PG (ref 27–31)
MCHC RBC AUTO-ENTMCNC: 33.7 G/DL (ref 32–36)
MCV RBC AUTO: 78 FL (ref 82–98)
MONOCYTES # BLD AUTO: 0.5 K/UL (ref 0.3–1)
MONOCYTES NFR BLD: 4.7 % (ref 4–15)
NEUTROPHILS # BLD AUTO: 8.2 K/UL (ref 1.8–7.7)
NEUTROPHILS NFR BLD: 78.1 % (ref 38–73)
NRBC BLD-RTO: 0 /100 WBC
PLATELET # BLD AUTO: 32 K/UL (ref 150–350)
PLATELET BLD QL SMEAR: ABNORMAL
PMV BLD AUTO: ABNORMAL FL (ref 9.2–12.9)
POLYCHROMASIA BLD QL SMEAR: ABNORMAL
POTASSIUM SERPL-SCNC: 3.8 MMOL/L (ref 3.5–5.1)
PROT SERPL-MCNC: 6.1 G/DL (ref 6–8.4)
RBC # BLD AUTO: 3.83 M/UL (ref 4–5.4)
SODIUM SERPL-SCNC: 132 MMOL/L (ref 136–145)
WBC # BLD AUTO: 10.46 K/UL (ref 3.9–12.7)

## 2020-06-28 PROCEDURE — 25500020 PHARM REV CODE 255: Performed by: OBSTETRICS & GYNECOLOGY

## 2020-06-28 PROCEDURE — 63600175 PHARM REV CODE 636 W HCPCS: Performed by: STUDENT IN AN ORGANIZED HEALTH CARE EDUCATION/TRAINING PROGRAM

## 2020-06-28 PROCEDURE — 63600175 PHARM REV CODE 636 W HCPCS: Performed by: OBSTETRICS & GYNECOLOGY

## 2020-06-28 PROCEDURE — 36415 COLL VENOUS BLD VENIPUNCTURE: CPT

## 2020-06-28 PROCEDURE — 80053 COMPREHEN METABOLIC PANEL: CPT

## 2020-06-28 PROCEDURE — 85025 COMPLETE CBC W/AUTO DIFF WBC: CPT

## 2020-06-28 PROCEDURE — 25000003 PHARM REV CODE 250: Performed by: OBSTETRICS & GYNECOLOGY

## 2020-06-28 PROCEDURE — 99231 PR SUBSEQUENT HOSPITAL CARE,LEVL I: ICD-10-PCS | Mod: ,,, | Performed by: OBSTETRICS & GYNECOLOGY

## 2020-06-28 PROCEDURE — 11000001 HC ACUTE MED/SURG PRIVATE ROOM

## 2020-06-28 PROCEDURE — 99231 SBSQ HOSP IP/OBS SF/LOW 25: CPT | Mod: ,,, | Performed by: OBSTETRICS & GYNECOLOGY

## 2020-06-28 RX ORDER — BUTALBITAL, ACETAMINOPHEN AND CAFFEINE 50; 325; 40 MG/1; MG/1; MG/1
1 TABLET ORAL EVERY 6 HOURS PRN
Status: DISCONTINUED | OUTPATIENT
Start: 2020-06-28 | End: 2020-07-01 | Stop reason: HOSPADM

## 2020-06-28 RX ORDER — NIFEDIPINE 30 MG/1
30 TABLET, EXTENDED RELEASE ORAL DAILY
Status: DISCONTINUED | OUTPATIENT
Start: 2020-06-28 | End: 2020-07-01 | Stop reason: HOSPADM

## 2020-06-28 RX ADMIN — MAGNESIUM SULFATE HEPTAHYDRATE 2 G/HR: 40 INJECTION, SOLUTION INTRAVENOUS at 03:06

## 2020-06-28 RX ADMIN — TOPIRAMATE 25 MG: 25 TABLET, FILM COATED ORAL at 09:06

## 2020-06-28 RX ADMIN — IOHEXOL 100 ML: 350 INJECTION, SOLUTION INTRAVENOUS at 12:06

## 2020-06-28 RX ADMIN — FAMOTIDINE 20 MG: 20 TABLET ORAL at 09:06

## 2020-06-28 RX ADMIN — CYCLOBENZAPRINE HYDROCHLORIDE 10 MG: 5 TABLET, FILM COATED ORAL at 05:06

## 2020-06-28 RX ADMIN — PROMETHAZINE HYDROCHLORIDE 12.5 MG: 25 INJECTION INTRAMUSCULAR; INTRAVENOUS at 01:06

## 2020-06-28 RX ADMIN — BUTALBITAL, ACETAMINOPHEN, AND CAFFEINE 1 TABLET: 50; 325; 40 TABLET ORAL at 04:06

## 2020-06-28 RX ADMIN — CYCLOBENZAPRINE HYDROCHLORIDE 10 MG: 5 TABLET, FILM COATED ORAL at 12:06

## 2020-06-28 RX ADMIN — NIFEDIPINE 30 MG: 30 TABLET, FILM COATED, EXTENDED RELEASE ORAL at 06:06

## 2020-06-28 RX ADMIN — HYDRALAZINE HYDROCHLORIDE 10 MG: 20 INJECTION INTRAMUSCULAR; INTRAVENOUS at 12:06

## 2020-06-28 RX ADMIN — BUTALBITAL, ACETAMINOPHEN, AND CAFFEINE 1 TABLET: 50; 325; 40 TABLET ORAL at 09:06

## 2020-06-28 RX ADMIN — SODIUM CHLORIDE, SODIUM LACTATE, POTASSIUM CHLORIDE, AND CALCIUM CHLORIDE: .6; .31; .03; .02 INJECTION, SOLUTION INTRAVENOUS at 01:06

## 2020-06-28 NOTE — SUBJECTIVE & OBJECTIVE
Past Medical History:   Diagnosis Date    Mental disorder     anxiety/depression     Migraines        History reviewed. No pertinent surgical history.    Review of patient's allergies indicates:  No Known Allergies    No current facility-administered medications on file prior to encounter.      Current Outpatient Medications on File Prior to Encounter   Medication Sig    acetaminophen (TYLENOL) 500 MG tablet Take 1 tablet (500 mg total) by mouth every 6 (six) hours as needed for Pain. (Patient not taking: Reported on 1/23/2020)    butalbital-acetaminophen-caffeine -40 mg (FIORICET, ESGIC) -40 mg per tablet Take 1 tablet by mouth every 6 (six) hours as needed for Pain (headache). (Patient not taking: Reported on 12/12/2019)    pantoprazole (PROTONIX) 40 MG tablet Take 1 tablet (40 mg total) by mouth once daily. (Patient not taking: Reported on 12/12/2019)    promethazine (PHENERGAN) 25 MG tablet Take 1 tablet (25 mg total) by mouth every 6 (six) hours as needed for Nausea. (Patient not taking: Reported on 12/12/2019)    sertraline (ZOLOFT) 50 MG tablet     topiramate (TOPAMAX) 25 MG tablet Take 25 mg by mouth once daily.      Family History     None        Tobacco Use    Smoking status: Never Smoker   Substance and Sexual Activity    Alcohol use: No    Drug use: No    Sexual activity: Yes     Review of Systems   Constitutional: Negative for activity change, chills and fever.   HENT: Negative for congestion, ear pain, sinus pressure and sinus pain.    Eyes: Negative for visual disturbance.   Respiratory: Negative for cough, choking, chest tightness and shortness of breath.    Cardiovascular: Negative for chest pain and leg swelling.   Gastrointestinal: Negative for abdominal pain, constipation, diarrhea, nausea and vomiting.   Genitourinary: Negative for difficulty urinating, dysuria, flank pain, frequency and urgency.   Musculoskeletal: Negative for back pain, myalgias, neck pain and neck  stiffness.   Skin: Negative for wound.   Neurological: Negative for dizziness, light-headedness, numbness and headaches.     Objective:     Vital Signs (Most Recent):  Temp: 97.5 °F (36.4 °C) (06/27/20 1351)  Pulse: 91 (06/28/20 1012)  Resp: 17 (06/27/20 2337)  BP: 127/61 (06/28/20 0958)  SpO2: 97 % (06/28/20 1012) Vital Signs (24h Range):  Temp:  [97.5 °F (36.4 °C)] 97.5 °F (36.4 °C)  Pulse:  [] 91  Resp:  [16-18] 17  SpO2:  [95 %-98 %] 97 %  BP: (108-192)/() 127/61     Weight: 117.9 kg (260 lb)  Body mass index is 49.13 kg/m².    Physical Exam  Vitals signs and nursing note reviewed.   Constitutional:       Appearance: She is well-developed.   HENT:      Head: Normocephalic and atraumatic.      Right Ear: External ear normal.      Left Ear: External ear normal.      Nose: Nose normal.      Mouth/Throat:      Mouth: Mucous membranes are moist.   Eyes:      Conjunctiva/sclera: Conjunctivae normal.   Neck:      Musculoskeletal: Normal range of motion and neck supple.   Cardiovascular:      Rate and Rhythm: Normal rate and regular rhythm.      Heart sounds: Normal heart sounds.   Pulmonary:      Effort: Pulmonary effort is normal. No respiratory distress.      Breath sounds: Normal breath sounds.   Abdominal:      General: Bowel sounds are normal. There is no distension.      Palpations: Abdomen is soft.      Tenderness: There is no abdominal tenderness.   Musculoskeletal: Normal range of motion.   Skin:     General: Skin is warm and dry.      Capillary Refill: Capillary refill takes less than 2 seconds.   Neurological:      Mental Status: She is alert and oriented to person, place, and time.         Significant Labs:   CBC:   Recent Labs   Lab 06/27/20  0813 06/27/20  1250 06/28/20  0637   WBC 11.74 13.16* 10.46   HGB 10.3* 10.3* 10.1*   HCT 31.1* 31.4* 30.0*    90* 32*     CMP:   Recent Labs   Lab 06/27/20  0813 06/27/20  1250 06/28/20  0637   * 132* 132*   K 3.9 3.7 3.8    102 100    CO2 22* 19* 24   GLU 89 84 97   BUN 11 10 10   CREATININE 0.7 0.6 0.6   CALCIUM 9.0 8.3* 7.4*   PROT 6.1 5.9* 6.1   ALBUMIN 2.2* 2.2* 2.2*   BILITOT 0.3 1.1* 2.1*   ALKPHOS 193* 202* 183*   AST 78* 358* 290*   ALT 48* 165* 156*   ANIONGAP 7* 11 8   EGFRNONAA >60 >60 >60     Magnesium:   Recent Labs   Lab 06/27/20  0813   MG 1.4*     POCT Glucose: No results for input(s): POCTGLUCOSE in the last 48 hours.    Significant Imaging: I have reviewed and interpreted all pertinent imaging results/findings within the past 24 hours.

## 2020-06-28 NOTE — NURSING
Per Dr. Martinez orders, hold off am dose of Procardia unless pressures begin to creep up ~ 140s. Mag to be stopped @837 am. Orders for pain meds for headche to be put in.

## 2020-06-28 NOTE — NURSING
Patient pain reassessed, states pain is 2/10. Lights turned out and fan provided. Restful environment promoted.

## 2020-06-28 NOTE — NURSING
Patient BP decreased. Nonpharmacologic measures provided for additional headache relief including pressure with baby blanket, cold washcloth to face, and ice pack. Patient c/o additional nausea; medication provided. Will continue to assess.

## 2020-06-28 NOTE — PROGRESS NOTES
06/28/20 1722 06/28/20 1740 06/28/20 1802   Vital Signs   BP (!) 147/89 (!) 142/91 (!) 140/87   MAP (mmHg) 110 112 107   Notified Dr. Martinez of pt's BP. Orders to admin Procardia 30 mg PO. WCT monitor pt. POC discussed with pt.

## 2020-06-28 NOTE — NURSING
Reassessed patient's pain. Patient stated dilaudid did help initially, but then patient vomited and her headache came back 7/10.

## 2020-06-28 NOTE — NURSING
*Late entry due to system downtime    0520- Patient awake and c/o 3/10 headache. Cyclobenzaprine offered and accepted. See MAR. Patient currently pumping.

## 2020-06-28 NOTE — NURSING
Nursery gave okay for parent's to come see baby @1400. Mom agreed to try and breast feed when in nursery.

## 2020-06-28 NOTE — NURSING TRANSFER
Nursing Transfer Note      6/28/2020     Transfer To: CT    Transfer via wheelchair    Transported by Transport    Notified: spouse

## 2020-06-28 NOTE — NURSING
"Spoke with Marleen,  about no response from CHRISTOPHER Flores. Phone number given for [facility]. Page returned. Resident stated he will call family medicine resident since patient has already had consult for family medicine and he will "call right back. It will be quick"  "

## 2020-06-28 NOTE — PROGRESS NOTES
[facility] Hospital Medicine  Progress Note    Patient Name: Brannon Lei  MRN: [id number]  Patient Class: IP- Inpatient   Admission Date: 2020  Length of Stay: 1 days  Attending Physician: Kaila Cuevas MD  Primary Care Provider: Primary Doctor No        Subjective:     Principal Problem:HELLP syndrome, delivered, current hospitalization        HPI:  Ms. Brannon Lei is a 30 yo female PMH of migraines ,  status post vaginal delivery this AM at 38w0d gestation to baby boy at 4:33am. After delivery, at approximately 7am patient became sweaty and hypertensive. She had elevated bps 180-190/100-110s, appeared pale, and had epigastric pain. Satting 89% and above. SBP continued to spike above 160: Patient recieved IV labetalol 40mg  then 80mg, hydralazine 10mg,  procardia 30XL PO, and IV labetalol 80mg. Patient initiated on magnesium. Labs consistent with HELLP syndrome. Ultrasound of abdomen demonstrates cholelithiasis with gallbladder wall thickening, negative sonographic Ferraro's sign. Hepatic parenchyma appears echogenic, indicative of infiltrative process or steatosis. Patient received dilaudid 1x, and abdominal pain resolved. Family medicine consulted for blood pressure control recommendations.     Patient was calm, NAD on exam. SBP of 155. No SOB, no chest pain. Some nausea and vomiting after delivery. No blurry vision. No abdominal pain to palpation.     Overview/Hospital Course:  No notes on file     Interval History   She is doing well this morning. BP better controlled     Past Medical History:   Diagnosis Date    Mental disorder     anxiety/depression     Migraines        History reviewed. No pertinent surgical history.    Review of patient's allergies indicates:  No Known Allergies    No current facility-administered medications on file prior to encounter.      Current Outpatient Medications on File Prior to Encounter   Medication Sig    acetaminophen (TYLENOL) 500  MG tablet Take 1 tablet (500 mg total) by mouth every 6 (six) hours as needed for Pain. (Patient not taking: Reported on 1/23/2020)    butalbital-acetaminophen-caffeine -40 mg (FIORICET, ESGIC) -40 mg per tablet Take 1 tablet by mouth every 6 (six) hours as needed for Pain (headache). (Patient not taking: Reported on 12/12/2019)    pantoprazole (PROTONIX) 40 MG tablet Take 1 tablet (40 mg total) by mouth once daily. (Patient not taking: Reported on 12/12/2019)    promethazine (PHENERGAN) 25 MG tablet Take 1 tablet (25 mg total) by mouth every 6 (six) hours as needed for Nausea. (Patient not taking: Reported on 12/12/2019)    sertraline (ZOLOFT) 50 MG tablet     topiramate (TOPAMAX) 25 MG tablet Take 25 mg by mouth once daily.      Family History     None        Tobacco Use    Smoking status: Never Smoker   Substance and Sexual Activity    Alcohol use: No    Drug use: No    Sexual activity: Yes     Review of Systems   Constitutional: Negative for activity change, chills and fever.   HENT: Negative for congestion, ear pain, sinus pressure and sinus pain.    Eyes: Negative for visual disturbance.   Respiratory: Negative for cough, choking, chest tightness and shortness of breath.    Cardiovascular: Negative for chest pain and leg swelling.   Gastrointestinal: Negative for abdominal pain, constipation, diarrhea, nausea and vomiting.   Genitourinary: Negative for difficulty urinating, dysuria, flank pain, frequency and urgency.   Musculoskeletal: Negative for back pain, myalgias, neck pain and neck stiffness.   Skin: Negative for wound.   Neurological: Negative for dizziness, light-headedness, numbness and headaches.     Objective:     Vital Signs (Most Recent):  Temp: 97.5 °F (36.4 °C) (06/27/20 1351)  Pulse: 91 (06/28/20 1012)  Resp: 17 (06/27/20 2337)  BP: 127/61 (06/28/20 0958)  SpO2: 97 % (06/28/20 1012) Vital Signs (24h Range):  Temp:  [97.5 °F (36.4 °C)] 97.5 °F (36.4 °C)  Pulse:  []  91  Resp:  [16-18] 17  SpO2:  [95 %-98 %] 97 %  BP: (108-192)/() 127/61     Weight: 117.9 kg (260 lb)  Body mass index is 49.13 kg/m².    Physical Exam  Vitals signs and nursing note reviewed.   Constitutional:       Appearance: She is well-developed.   HENT:      Head: Normocephalic and atraumatic.      Right Ear: External ear normal.      Left Ear: External ear normal.      Nose: Nose normal.      Mouth/Throat:      Mouth: Mucous membranes are moist.   Eyes:      Conjunctiva/sclera: Conjunctivae normal.   Neck:      Musculoskeletal: Normal range of motion and neck supple.   Cardiovascular:      Rate and Rhythm: Normal rate and regular rhythm.      Heart sounds: Normal heart sounds.   Pulmonary:      Effort: Pulmonary effort is normal. No respiratory distress.      Breath sounds: Normal breath sounds.   Abdominal:      General: Bowel sounds are normal. There is no distension.      Palpations: Abdomen is soft.      Tenderness: There is no abdominal tenderness.   Musculoskeletal: Normal range of motion.   Skin:     General: Skin is warm and dry.      Capillary Refill: Capillary refill takes less than 2 seconds.   Neurological:      Mental Status: She is alert and oriented to person, place, and time.         Significant Labs:   CBC:   Recent Labs   Lab 06/27/20  0813 06/27/20  1250 06/28/20  0637   WBC 11.74 13.16* 10.46   HGB 10.3* 10.3* 10.1*   HCT 31.1* 31.4* 30.0*    90* 32*     CMP:   Recent Labs   Lab 06/27/20  0813 06/27/20  1250 06/28/20  0637   * 132* 132*   K 3.9 3.7 3.8    102 100   CO2 22* 19* 24   GLU 89 84 97   BUN 11 10 10   CREATININE 0.7 0.6 0.6   CALCIUM 9.0 8.3* 7.4*   PROT 6.1 5.9* 6.1   ALBUMIN 2.2* 2.2* 2.2*   BILITOT 0.3 1.1* 2.1*   ALKPHOS 193* 202* 183*   AST 78* 358* 290*   ALT 48* 165* 156*   ANIONGAP 7* 11 8   EGFRNONAA >60 >60 >60     Magnesium:   Recent Labs   Lab 06/27/20  0813   MG 1.4*     POCT Glucose: No results for input(s): POCTGLUCOSE in the last 48  hours.    Significant Imaging: I have reviewed and interpreted all pertinent imaging results/findings within the past 24 hours.      Assessment/Plan:      * HELLP syndrome, delivered, current hospitalization  Nifedipine 30 mg   Give 10mg hydralazine q30 for SBP>160, goal of <160 for up to 8 doses            VTE Risk Mitigation (From admission, onward)         Ordered     Place GEORGIE hose  Until discontinued      06/27/20 0610                Thanks for the consult we will signoff, please re-consult if needed         D'Amico C Johnson, MD  Department of Hospital Medicine   [facility]

## 2020-06-28 NOTE — NURSING
Late entry   2012- MD notified of patient's persistent headache. Patient stated the IV dilaudid helped initially, but then she vomited and her headache came back at 7/10. Orders received for IV phenergan and to restart patient's home dose of topamax. Ok to dose with percocet in between as needed for headache as ordered, however be mindful of total acetaminophen dosage. Orders confirmed with readback.

## 2020-06-28 NOTE — NURSING
Notified Dr. Martinez that patient is still c/o headache pain rating it a 3/10 even after Fiorecet admin. CT of head orders to be placed.

## 2020-06-28 NOTE — NURSING
1916- Spoke to Dr Martinez and coordinated with MD on plan of care for patient's headache. Telephone order given to administer PRN hydromorphone for headache if persists after percocet administration. If ineffective, notify MD.     1927- Pain reassessment conducted. Patient stated her headache is now 7/10. No improvement with percocet administration. Hydromorphone administered (see MAR). Will reassess. Patient set up with breast pump and currently pumping.

## 2020-06-28 NOTE — NURSING
*Late entry due to system downtime  0340 Patient safety rounds conducted. Bed in lowest position, call light in reach, visualized patient. Appears to be sleeping comfortably.

## 2020-06-28 NOTE — PROGRESS NOTES
[facility]  Obstetrics  Postpartum Progress Note    Patient Name: Brannon Lei  MRN: [id number]  Admission Date: 6/27/2020  Hospital Length of Stay: 1 days  Attending Physician: Kaila Cuevas MD  Primary Care Provider: Primary Doctor No    Subjective:     Principal Problem:HELLP syndrome, delivered, current hospitalization    Hospital course:   Brannon Lei is a 29 y.o. female PPD #1 status post Spontaneous vaginal delivery. Patient admitted for labor and dx with preeclampsia then HELLP immediately after delivery    6/27/20- started on Mag after delivery. Normal abdominal [facility] medicine consulted for BP control  6/28/20: some severe range BP overnight- requiring multiple doses of hydralazine overnight. Currently normotensive with 3/10 headache.     Interval History:   She is doing well this morning. Little to no appetite so not yet tolerating regular diet. Denies nausea.  She is not voiding spontaneously. She is not ambulating. Vaginal bleeding is mild. She denies fever or chills. Abdominal pain is mild and controlled with oral medications. She is pumping currently.     Objective:     Vital Signs (Most Recent):  Temp: 97.5 °F (36.4 °C) (06/27/20 1351)  Pulse: (!) 124 (06/28/20 0801)  Resp: 17 (06/27/20 2337)  BP: 122/72 (06/28/20 0801)  SpO2: 98 % (06/28/20 0750) Vital Signs (24h Range):  Temp:  [97.5 °F (36.4 °C)] 97.5 °F (36.4 °C)  Pulse:  [] 124  Resp:  [16-20] 17  SpO2:  [95 %-98 %] 98 %  BP: (108-192)/() 122/72     Weight: 117.9 kg (260 lb)  Body mass index is 49.13 kg/m².      Intake/Output Summary (Last 24 hours) at 6/28/2020 0809  Last data filed at 6/28/2020 0620  Gross per 24 hour   Intake 1069.16 ml   Output 5580 ml   Net -4510.84 ml       Physical Exam:   Constitutional: She is oriented to person, place, and time. She appears well-developed and well-nourished. No distress.    HENT:   Head: Normocephalic and atraumatic.      Cardiovascular: Normal rate,  regular rhythm and normal heart sounds.     Pulmonary/Chest: Effort normal and breath sounds normal. She has no wheezes. She has no rales.        Abdominal: Soft. There is no abdominal tenderness.             Musculoskeletal: Moves all extremeties. No edema.      Comments: Minimal BLE edema  Normal DTR       Neurological: She is alert and oriented to person, place, and time. She has normal reflexes.    Skin: Skin is warm and dry.    Psychiatric: She has a normal mood and affect.       Significant Labs:  Lab Results   Component Value Date    GROUPTRH A POS 2020    HEPBSAG Negative 2019    STREPBCULT No Group B Streptococcus isolated 06/10/2020     CBC:   2020 12:50 2020 06:37   WBC 13.16 (H) 10.46   RBC 3.95 (L) 3.83 (L)   Hemoglobin 10.3 (L) 10.1 (L)   Hematocrit 31.4 (L) 30.0 (L)   MCV 80 (L) 78 (L)   MCH 26.1 (L) 26.4 (L)   MCHC 32.8 33.7   RDW 14.4 15.5 (H)   Platelets 90 (L) 32 (LL)       CMP:     2020 08:13 2020 12:50 2020 06:37   Sodium 134 (L) 132 (L) 132 (L)   Potassium 3.9 3.7 3.8   Chloride 105 102 100   CO2 22 (L) 19 (L) 24   Anion Gap 7 (L) 11 8   BUN, Bld 11 10 10   Creatinine 0.7 0.6 0.6   Glucose 89 84 97   Calcium 9.0 8.3 (L) 7.4 (L)   Magnesium 1.4 (L)     Alkaline Phosphatase 193 (H) 202 (H) 183 (H)   PROTEIN TOTAL 6.1 5.9 (L) 6.1   Albumin 2.2 (L) 2.2 (L) 2.2 (L)   BILIRUBIN TOTAL 0.3 1.1 (H) 2.1 (H)   AST 78 (H) 358 (H) 290 (H)   ALT 48 (H) 165 (H) 156 (H)       I have personallly reviewed all pertinent lab results from the last 24 hours.    Assessment/Plan:     29 y.o. female  at 38w0d for:    Active Diagnoses:    Diagnosis Date Noted POA    PRINCIPAL PROBLEM:  HELLP syndrome, delivered, current hospitalization [O14.24] 2020 Unknown    Abdominal pain during pregnancy [O26.899, R10.9] 2020 Yes    Preeclampsia, severe, third trimester [O14.13] 2020 Yes      Problems Resolved During this Admission:       1. Postpartum care:  - Patient  doing well. Continue routine management and advances.  - Continue PO pain meds. Pain well controlled save for headache  - Encourage ambulation once Mag and karimi removed  - Circumcision if desired  - Lactation - consult as needed,     2. Preeclampsia with severe features and HELLP  - mag discontinue after 24hrs. Labs: plt count  decreasing, LFT improving. Cr stable.  BP normal at this time. Continue to closely monitor. Treat headache prn but if persistent and not improving with meds will order imaging. Pt voiced understanding. F/U am LABS      Disposition: As patient meets milestones, will plan to discharge once clinically stable.    Keyana Martinez MD  Obstetrics  [facility]

## 2020-06-28 NOTE — NURSING
Received call from Dr Mata from [facility] Medicine regarding patient's care at 0010. Reviewed case with MD including increasing BP's after IV hydralazine administration. MD stated to keep dosing with hydralazine 10mg as ordered for a total of 8 doses. RN stated she will call MD back if next dose of hydralazine IV is ineffective. Patient needs emergent management. MD verbalized acknowledgement.

## 2020-06-28 NOTE — NURSING
RN noticed increased blood pressure. Patient seen at bedside with headache 6/10. IV hydralazine given. 15 minutes later, increase in BP noted. Dr Martinez called at [phone] regarding plan of care. Orders received from procardia 10mg immediate release and treat pain with ordered percocet, then follow up additionally with hospital medicine.     CHRISTOPHER Flores with hospital medicine paged at [phone] for update on plan of care. Will await return call

## 2020-06-29 LAB
ALBUMIN SERPL BCP-MCNC: 2.4 G/DL (ref 3.5–5.2)
ALP SERPL-CCNC: 165 U/L (ref 55–135)
ALT SERPL W/O P-5'-P-CCNC: 84 U/L (ref 10–44)
ANION GAP SERPL CALC-SCNC: 8 MMOL/L (ref 8–16)
AST SERPL-CCNC: 40 U/L (ref 10–40)
BASOPHILS # BLD AUTO: 0.02 K/UL (ref 0–0.2)
BASOPHILS NFR BLD: 0.2 % (ref 0–1.9)
BILIRUB SERPL-MCNC: 0.6 MG/DL (ref 0.1–1)
BUN SERPL-MCNC: 10 MG/DL (ref 6–20)
CALCIUM SERPL-MCNC: 8.6 MG/DL (ref 8.7–10.5)
CHLORIDE SERPL-SCNC: 105 MMOL/L (ref 95–110)
CO2 SERPL-SCNC: 25 MMOL/L (ref 23–29)
CREAT SERPL-MCNC: 0.6 MG/DL (ref 0.5–1.4)
DIFFERENTIAL METHOD: ABNORMAL
EOSINOPHIL # BLD AUTO: 0.2 K/UL (ref 0–0.5)
EOSINOPHIL NFR BLD: 2.1 % (ref 0–8)
ERYTHROCYTE [DISTWIDTH] IN BLOOD BY AUTOMATED COUNT: 15.9 % (ref 11.5–14.5)
EST. GFR  (AFRICAN AMERICAN): >60 ML/MIN/1.73 M^2
EST. GFR  (NON AFRICAN AMERICAN): >60 ML/MIN/1.73 M^2
GLUCOSE SERPL-MCNC: 82 MG/DL (ref 70–110)
HCT VFR BLD AUTO: 29.3 % (ref 37–48.5)
HGB BLD-MCNC: 9.5 G/DL (ref 12–16)
IMM GRANULOCYTES # BLD AUTO: 0.06 K/UL (ref 0–0.04)
IMM GRANULOCYTES NFR BLD AUTO: 0.5 % (ref 0–0.5)
LYMPHOCYTES # BLD AUTO: 2.8 K/UL (ref 1–4.8)
LYMPHOCYTES NFR BLD: 24.4 % (ref 18–48)
MCH RBC QN AUTO: 26.3 PG (ref 27–31)
MCHC RBC AUTO-ENTMCNC: 32.4 G/DL (ref 32–36)
MCV RBC AUTO: 81 FL (ref 82–98)
MONOCYTES # BLD AUTO: 0.6 K/UL (ref 0.3–1)
MONOCYTES NFR BLD: 5.1 % (ref 4–15)
NEUTROPHILS # BLD AUTO: 7.6 K/UL (ref 1.8–7.7)
NEUTROPHILS NFR BLD: 67.7 % (ref 38–73)
NRBC BLD-RTO: 0 /100 WBC
PLATELET # BLD AUTO: 49 K/UL (ref 150–350)
PMV BLD AUTO: ABNORMAL FL (ref 9.2–12.9)
POTASSIUM SERPL-SCNC: 3.8 MMOL/L (ref 3.5–5.1)
PROT SERPL-MCNC: 6.6 G/DL (ref 6–8.4)
RBC # BLD AUTO: 3.61 M/UL (ref 4–5.4)
SODIUM SERPL-SCNC: 138 MMOL/L (ref 136–145)
WBC # BLD AUTO: 11.27 K/UL (ref 3.9–12.7)

## 2020-06-29 PROCEDURE — 11000001 HC ACUTE MED/SURG PRIVATE ROOM

## 2020-06-29 PROCEDURE — 99231 SBSQ HOSP IP/OBS SF/LOW 25: CPT | Mod: ,,, | Performed by: OBSTETRICS & GYNECOLOGY

## 2020-06-29 PROCEDURE — 25000003 PHARM REV CODE 250: Performed by: OBSTETRICS & GYNECOLOGY

## 2020-06-29 PROCEDURE — 80053 COMPREHEN METABOLIC PANEL: CPT

## 2020-06-29 PROCEDURE — 99231 PR SUBSEQUENT HOSPITAL CARE,LEVL I: ICD-10-PCS | Mod: ,,, | Performed by: OBSTETRICS & GYNECOLOGY

## 2020-06-29 PROCEDURE — 36415 COLL VENOUS BLD VENIPUNCTURE: CPT

## 2020-06-29 PROCEDURE — 85025 COMPLETE CBC W/AUTO DIFF WBC: CPT

## 2020-06-29 RX ADMIN — FAMOTIDINE 20 MG: 20 TABLET ORAL at 08:06

## 2020-06-29 RX ADMIN — NIFEDIPINE 30 MG: 30 TABLET, FILM COATED, EXTENDED RELEASE ORAL at 07:06

## 2020-06-29 RX ADMIN — TOPIRAMATE 25 MG: 25 TABLET, FILM COATED ORAL at 08:06

## 2020-06-29 RX ADMIN — OXYCODONE AND ACETAMINOPHEN 1 TABLET: 5; 325 TABLET ORAL at 07:06

## 2020-06-29 RX ADMIN — OXYCODONE AND ACETAMINOPHEN 1 TABLET: 5; 325 TABLET ORAL at 12:06

## 2020-06-29 NOTE — NURSING
Patient report received from DERIK Patel. Patient seen at bedside in left lateral position with pillows and spouse asleep in chair at bedside. Plan of care reviewed with patient as well as teaching on low platelet count. Bleeding precautions discussed with patient as well as instructions on safe ambulation. Call light within reach. Encouraged patient to call with help for mobility if she feels dizzy or weak. Will continue to assess.

## 2020-06-29 NOTE — ANESTHESIA POSTPROCEDURE EVALUATION
Anesthesia Post Evaluation    Patient: Brannon Lei    Procedure(s) Performed: * No procedures listed *    Final Anesthesia Type: CSE    Patient location during evaluation: labor & delivery  Patient participation: Yes- Able to Participate  Level of consciousness: awake and alert  Post-procedure vital signs: reviewed and stable  Pain management: adequate  Airway patency: patent    PONV status at discharge: No PONV  Anesthetic complications: no      Cardiovascular status: hemodynamically stable  Respiratory status: unassisted  Hydration status: euvolemic  Follow-up not needed.          Vitals Value Taken Time   /84 06/29/20 0800   Temp 36.6 °C (97.8 °F) 06/29/20 0800   Pulse 103 06/29/20 0800   Resp 18 06/29/20 0800   SpO2 100 % 06/29/20 0800         No case tracking events are documented in the log.      Pain/Mariella Score: Pain Rating Prior to Med Admin: 4 (6/29/2020 12:30 AM)    No catheter in back  No headache/neckache/backache  Full return of neurological function  Able to urinate  Advised patient to report any new problems of back pain, especially with fever or decreasing bladder function occurring during coming days to weeks    Alfonso Chin MD  [facility]  [phone]

## 2020-06-29 NOTE — NURSING
Patient and spouse returned to room from nursery. Patient c/o 4/20 pain in lower abdomen. Medicated for pain. See MAR.

## 2020-06-29 NOTE — PROGRESS NOTES
[facility]  Obstetrics  Postpartum Progress Note    Patient Name: Brannon Lei  MRN: [id number]  Admission Date: 6/27/2020  Hospital Length of Stay: 2 days  Attending Physician: Kaila Cuevas MD  Primary Care Provider: Primary Doctor No    Subjective:     Principal Problem:HELLP syndrome, delivered, current hospitalization    Hospital course:   Brannon Lei is a 29 y.o. female PPD #2 status post Spontaneous vaginal delivery. Patient admitted for labor and dx with preeclampsia then HELLP immediately after delivery    6/27/20- started on Mag after delivery. (BP and epigastric pain) [facility]  6/28/20: severe range BP overnight- requiring multiple doses of IV hydralazine overnight. Currently normotensive with 3/10 headache.     06/29/20: Did well overnight. Yesterday's head CTA normal. Denies headache currently. Restarted procardia 30XL at 6pm when BP started to rise again.    Interval History:   She is doing well this morning. Tolerating regular diet, denies nausea/vomiting. She is voiding spontaneously. She is  ambulating. Vaginal bleeding is mild. She denies fever or chills. Abdominal pain is mild and controlled with oral medications. She is pumping currently- infant in NICU (not tolerating oral feeds)    Objective:     Vital Signs (Most Recent):  Temp: 97.5 °F (36.4 °C) (06/28/20 0800)  Pulse: 104 (06/28/20 2116)  Resp: 19 (06/29/20 0030)  BP: (!) 142/85 (06/28/20 2116)  SpO2: 98 % (06/28/20 1230) Vital Signs (24h Range):  Temp:  [97.5 °F (36.4 °C)] 97.5 °F (36.4 °C)  Pulse:  [] 104  Resp:  [19] 19  SpO2:  [97 %-99 %] 98 %  BP: (108-147)/(57-91) 142/85     Weight: 117.9 kg (260 lb)  Body mass index is 49.13 kg/m².      Intake/Output Summary (Last 24 hours) at 6/29/2020 0408  Last data filed at 6/28/2020 1200  Gross per 24 hour   Intake 100 ml   Output 2325 ml   Net -2225 ml       Physical Exam:   Constitutional: She is  oriented to person, place, and time. She appears well-developed and well-nourished. No distress.    HENT:   Head: Normocephalic and atraumatic.      Cardiovascular: Normal rate, regular rhythm and normal heart sounds.     Pulmonary/Chest: Effort normal and breath sounds normal. She has no wheezes. She has no rales.        Abdominal: Soft. There is no abdominal tenderness.             Musculoskeletal: Moves all extremeties. No edema.      Comments: Minimal BLE edema  Normal DTR       Neurological: She is alert and oriented to person, place, and time. She has normal reflexes.    Skin: Skin is warm and dry.    Psychiatric: She has a normal mood and affect.       Significant Labs:  Lab Results   Component Value Date    GROUPTRH A POS 2020    HEPBSAG Negative 2019    STREPBCULT No Group B Streptococcus isolated 06/10/2020     CBC: AM labs pending collection   2020 12:50 2020 06:37   WBC 13.16 (H) 10.46   RBC 3.95 (L) 3.83 (L)   Hemoglobin 10.3 (L) 10.1 (L)   Hematocrit 31.4 (L) 30.0 (L)   MCV 80 (L) 78 (L)   MCH 26.1 (L) 26.4 (L)   MCHC 32.8 33.7   RDW 14.4 15.5 (H)   Platelets 90 (L) 32 (LL)       CMP: AM labs pending collection     2020 08:13 2020 12:50 2020 06:37   Sodium 134 (L) 132 (L) 132 (L)   Potassium 3.9 3.7 3.8   Chloride 105 102 100   CO2 22 (L) 19 (L) 24   Anion Gap 7 (L) 11 8   BUN, Bld 11 10 10   Creatinine 0.7 0.6 0.6   Glucose 89 84 97   Calcium 9.0 8.3 (L) 7.4 (L)   Magnesium 1.4 (L)     Alkaline Phosphatase 193 (H) 202 (H) 183 (H)   PROTEIN TOTAL 6.1 5.9 (L) 6.1   Albumin 2.2 (L) 2.2 (L) 2.2 (L)   BILIRUBIN TOTAL 0.3 1.1 (H) 2.1 (H)   AST 78 (H) 358 (H) 290 (H)   ALT 48 (H) 165 (H) 156 (H)       I have personallly reviewed all pertinent lab results from the last 24 hours.    Assessment/Plan:     29 y.o. female  at 38w0d for:    Active Diagnoses:    Diagnosis Date Noted POA    PRINCIPAL PROBLEM:  HELLP syndrome, delivered, current hospitalization [O14.24]  06/27/2020 Unknown    Abdominal pain during pregnancy [O26.899, R10.9] 06/27/2020 Yes    Preeclampsia, severe, third trimester [O14.13] 06/27/2020 Yes      Problems Resolved During this Admission:       1. Postpartum care:  - Patient doing well. Continue routine management and advances.  - Continue PO pain meds.  - Encourage ambulation   - Circumcision- prior to discharge  - Lactation - consult as needed,   - restarted home topamax and zoloft      2. Preeclampsia with severe features and HELLP  - s/p 24hr of Mag. Trending labs: AM labs pending collection.   -BP: (108-147)/(57-91) 142/85. will  Monitor BP on procardia today. Ok to transfer to postpartum    Disposition: As patient meets milestones, will plan to discharge home in AM if BP remain stable.    Keyana Martinez MD  Obstetrics  [facility]

## 2020-06-30 DIAGNOSIS — R03.0 BLOOD PRESSURE ELEVATED WITHOUT HISTORY OF HTN: ICD-10-CM

## 2020-06-30 DIAGNOSIS — G44.009 CLUSTER HEADACHE, NOT INTRACTABLE, UNSPECIFIED CHRONICITY PATTERN: ICD-10-CM

## 2020-06-30 DIAGNOSIS — R52 POSTPARTUM PAIN: Primary | ICD-10-CM

## 2020-06-30 DIAGNOSIS — F41.9 ANXIETY: ICD-10-CM

## 2020-06-30 LAB
ALBUMIN SERPL BCP-MCNC: 2.2 G/DL (ref 3.5–5.2)
ALP SERPL-CCNC: 150 U/L (ref 55–135)
ALT SERPL W/O P-5'-P-CCNC: 62 U/L (ref 10–44)
ANION GAP SERPL CALC-SCNC: 7 MMOL/L (ref 8–16)
AST SERPL-CCNC: 31 U/L (ref 10–40)
BASOPHILS # BLD AUTO: 0.02 K/UL (ref 0–0.2)
BASOPHILS NFR BLD: 0.2 % (ref 0–1.9)
BILIRUB SERPL-MCNC: 0.5 MG/DL (ref 0.1–1)
BUN SERPL-MCNC: 9 MG/DL (ref 6–20)
CALCIUM SERPL-MCNC: 8.2 MG/DL (ref 8.7–10.5)
CHLORIDE SERPL-SCNC: 106 MMOL/L (ref 95–110)
CO2 SERPL-SCNC: 24 MMOL/L (ref 23–29)
CREAT SERPL-MCNC: 0.6 MG/DL (ref 0.5–1.4)
DIFFERENTIAL METHOD: ABNORMAL
EOSINOPHIL # BLD AUTO: 0.3 K/UL (ref 0–0.5)
EOSINOPHIL NFR BLD: 2.6 % (ref 0–8)
ERYTHROCYTE [DISTWIDTH] IN BLOOD BY AUTOMATED COUNT: 15.7 % (ref 11.5–14.5)
EST. GFR  (AFRICAN AMERICAN): >60 ML/MIN/1.73 M^2
EST. GFR  (NON AFRICAN AMERICAN): >60 ML/MIN/1.73 M^2
GLUCOSE SERPL-MCNC: 74 MG/DL (ref 70–110)
HCT VFR BLD AUTO: 27.8 % (ref 37–48.5)
HGB BLD-MCNC: 8.8 G/DL (ref 12–16)
IMM GRANULOCYTES # BLD AUTO: 0.08 K/UL (ref 0–0.04)
IMM GRANULOCYTES NFR BLD AUTO: 0.8 % (ref 0–0.5)
LYMPHOCYTES # BLD AUTO: 3.9 K/UL (ref 1–4.8)
LYMPHOCYTES NFR BLD: 37.3 % (ref 18–48)
MCH RBC QN AUTO: 26 PG (ref 27–31)
MCHC RBC AUTO-ENTMCNC: 31.7 G/DL (ref 32–36)
MCV RBC AUTO: 82 FL (ref 82–98)
MONOCYTES # BLD AUTO: 0.6 K/UL (ref 0.3–1)
MONOCYTES NFR BLD: 5.8 % (ref 4–15)
NEUTROPHILS # BLD AUTO: 5.6 K/UL (ref 1.8–7.7)
NEUTROPHILS NFR BLD: 53.3 % (ref 38–73)
NRBC BLD-RTO: 0 /100 WBC
PLATELET # BLD AUTO: 64 K/UL (ref 150–350)
PMV BLD AUTO: ABNORMAL FL (ref 9.2–12.9)
POTASSIUM SERPL-SCNC: 4 MMOL/L (ref 3.5–5.1)
PROT SERPL-MCNC: 6.1 G/DL (ref 6–8.4)
RBC # BLD AUTO: 3.38 M/UL (ref 4–5.4)
SODIUM SERPL-SCNC: 137 MMOL/L (ref 136–145)
WBC # BLD AUTO: 10.55 K/UL (ref 3.9–12.7)

## 2020-06-30 PROCEDURE — 80053 COMPREHEN METABOLIC PANEL: CPT

## 2020-06-30 PROCEDURE — 85025 COMPLETE CBC W/AUTO DIFF WBC: CPT

## 2020-06-30 PROCEDURE — 99231 SBSQ HOSP IP/OBS SF/LOW 25: CPT | Mod: ,,, | Performed by: OBSTETRICS & GYNECOLOGY

## 2020-06-30 PROCEDURE — 25000003 PHARM REV CODE 250: Performed by: OBSTETRICS & GYNECOLOGY

## 2020-06-30 PROCEDURE — 99231 PR SUBSEQUENT HOSPITAL CARE,LEVL I: ICD-10-PCS | Mod: ,,, | Performed by: OBSTETRICS & GYNECOLOGY

## 2020-06-30 PROCEDURE — 36415 COLL VENOUS BLD VENIPUNCTURE: CPT

## 2020-06-30 PROCEDURE — 11000001 HC ACUTE MED/SURG PRIVATE ROOM

## 2020-06-30 RX ORDER — TOPIRAMATE 25 MG/1
25 TABLET ORAL NIGHTLY
Qty: 30 TABLET | Refills: 0 | Status: SHIPPED | OUTPATIENT
Start: 2020-06-30 | End: 2021-06-30

## 2020-06-30 RX ORDER — NIFEDIPINE 30 MG/1
30 TABLET, EXTENDED RELEASE ORAL DAILY
Qty: 30 TABLET | Refills: 0 | Status: SHIPPED | OUTPATIENT
Start: 2020-06-30 | End: 2021-06-30

## 2020-06-30 RX ORDER — SERTRALINE HYDROCHLORIDE 50 MG/1
50 TABLET, FILM COATED ORAL DAILY
Qty: 30 TABLET | Refills: 0 | Status: SHIPPED | OUTPATIENT
Start: 2020-06-30 | End: 2021-06-30

## 2020-06-30 RX ORDER — OXYCODONE AND ACETAMINOPHEN 5; 325 MG/1; MG/1
1 TABLET ORAL EVERY 4 HOURS PRN
Qty: 20 TABLET | Refills: 0 | Status: SHIPPED | OUTPATIENT
Start: 2020-06-30

## 2020-06-30 RX ADMIN — SIMETHICONE CHEW TAB 80 MG 80 MG: 80 TABLET ORAL at 03:06

## 2020-06-30 RX ADMIN — BUTALBITAL, ACETAMINOPHEN, AND CAFFEINE 1 TABLET: 50; 325; 40 TABLET ORAL at 03:06

## 2020-06-30 RX ADMIN — FAMOTIDINE 20 MG: 20 TABLET ORAL at 08:06

## 2020-06-30 RX ADMIN — TOPIRAMATE 25 MG: 25 TABLET, FILM COATED ORAL at 09:06

## 2020-06-30 RX ADMIN — OXYCODONE AND ACETAMINOPHEN 1 TABLET: 5; 325 TABLET ORAL at 07:06

## 2020-06-30 RX ADMIN — NIFEDIPINE 30 MG: 30 TABLET, FILM COATED, EXTENDED RELEASE ORAL at 09:06

## 2020-06-30 NOTE — DISCHARGE INSTRUCTIONS
"Patient Discharge Instructions for Postpartum Women    Resume Regular Diet  Increase activity gradually, no heavy lifting  Shower  No tampons, douching or sexual intercourse.  Discuss birth control options with your physician.  Wear a support bra  Return to work/school when you've been cleared by a physician    Call your physician if     *Fever of 100.4 or higher  *Persistent nausea/ vomiting  *Incisional drainage  *Heavy vaginal bleeding or large clots (Heavy bleeding is soaking 1 pad in an hour)  *Swelling and pain in arms or legs  *Severe headaches, blurred vision or fainting  *Shortness of breath  *Frequency and burning with urination  *Signs of postpartum depression, discuss these signs with your physician    Call lactation services for questions regarding feeding, nipple and breast care, and general questions about lactation.  They can be reached at [phone]         Understanding Postpartum Depression    You've just had a baby.  You know you should be excited and happy.  But instead you find yourself crying for no reason.  You may have trouble coping with your daily tasks.  You feel sad, tired, and hopeless most of the time.  You may even feel ashamed or guilty.  But what you're going through is not your fault and you can feel better.  Talk to your doctor.  He or she can help.    Depression After Childbirth    You may be weepy and tired right after giving birth.  These feelings are normal.  They're sometimes called the "baby blues."  These blues go away 2-3 weeks.  However, postpartum (meaning "after birth") depression lasts much longer and is more sever than the "baby blues."  It can make you feel sad and hopeless.  You may also fear that your baby will be harmed and worry about being a bad mother.      What is Depression?    Depression is a mood disorder that affects the way you think and feel.  The most common symptom is a feeling of deep sadness.  You may also feel as if you just can't cope with life.  "   Other symptoms include:      * Gaining or loosing weight  * Sleeping too much or too little  * Feeling tired all the time  * Feeling restless  * Fears of harming your baby   * Lack of interest in your baby  * Feeling worthless or guilty  * No longer finding pleasure in things you used to  * Having trouble thinking clearly or making decisions  * Thoughts of hurting yourself or your baby    What Causes Postpartum Depression    The exact causes of postpartum depression isn't known.  It may be due to changes in your hormones during and after childbirth.  You may also be tired from caring for your baby and adjusting to being a mother.  All these factors may make you feel depressed.  In some cases, your genes may also play a role.    Depression Can Be Treated    The good news is that there are many ways to treat postpartum depression.  Talking to your doctor is the first step toward feeling better.    Resources:    * National [place] of Mental Health  -- 448.385.3333    www.nimh.nih.gov    * National Lind on Mental Illness --801.528.8397    Www.yash.org    * Mental Health [place] -- 393.267.7598     Www.[facility].org    * National Suicide Hotline --279.476.8340 (800-SUICIDE)    7232-6389 [employer]  All rights reserved.  This information is not intended as a substitute for professional medical care.  Always follow up with your healthcare professional's instructions.            Breastfeeding Discharge Instructions       Feed the baby at the earliest sign of hunger or comfort  o Hands to mouth, sucking motions  o Rooting or searching for something to suck on  o Dont wait for crying - it is a sign of distress     The feedings may be 8-12 times per 24hrs and will not follow a schedule   Avoid pacifiers and bottles for the first 4 weeks   Alternate the breast you start the feeding with, or start with the breast that feels the fullest   Switch breasts when the baby takes himself off the breast or falls  asleep   Keep offering breasts until the baby looks full, no longer gives hunger signs, and stays asleep when placed on his back in the crib   If the baby is sleepy and wont wake for a feeding, put the baby skin-to-skin dressed in a diaper against the mothers bare chest   Sleep near your baby   The baby should be positioned and latched on to the breast correctly  o Chest-to-chest, chin in the breast  o Babys lips are flipped outward  o Babys mouth is stretched open wide like a shout  o Babys sucking should feel like tugging to the mother  - The baby should be drinking at the breast:  o You should hear swallowing or gulping throughout the feeding  o You should see milk on the babys lips when he comes off the breast  o Your breasts should be softer when the baby is finished feeding  o The baby should look relaxed at the end of feedings  o After the 4th day and your milk is in:  o The babys poop should turn bright yellow and be loose, watery, and seedy  o The baby should have at least 3-4 poops the size of the palm of your hand per day  o The baby should have at least 5-6 wet diapers per day  o The urine should be light yellow in color  You should drink when you are thirsty and eat a healthy diet when you are    hungry.     Take naps to get the rest you need.   Take medications and/or drink alcohol only with permission of your obstetrician    or the babys pediatrician.  You can also call the Infant Risk Center,   ([phone]), Monday-Friday, 8am-5pm Central time, to get the most   up-to-date evidence-based information on the use of medications during   pregnancy and breastfeeding.      The baby should be examined by a pediatrician at 3-5 days of age.  Once your   milk comes in, the baby should be gaining at least ½ - 1oz each day and should be back to birthweight no later than 10-14 days of age.          Community Resources    [facility] Breastfeeding Warmline: [phone]  Local  WIC clinics: provide incentives and breastpumps to eligible mothers  La Leche League International (LLLI):  mother-to-mother support group website        www.lll.org  [facility] La Leche League mother-to-mother support groups:        www.llivyCueThink.quickhuddle        La Leche League [facility]   Dr. Kimani Pate website for latch videos and general information:        www.breastfeedinginc.ca  Infant Risk Center is a call center that provides information about the safety of taking medications while breastfeeding.  Call 1-622.778.8858, M-F, 8am-5pm, CT.  International Lactation Consultant Association provides resources for assistance:        www.ilca.org  [facility] Breastfeeding Coalition provides informationand resources for parents  and the community    http://[facility].org     Orquidea Wright is a mom-to-mom support group:                             www.Sp[employer]rinivasan[employer] 3D.quickhuddle//breastfeedng-support/  Partners for Healthy Babies:  9-449-837-BABY(0829)  [place]: a breastfeeding support group for women of color, 589.856.5725

## 2020-06-30 NOTE — PROGRESS NOTES
PPD #3    S: patient doing ok. Was complaining of feeling dizzy and head pounding with bending/standing on yesterday. Today, unsure if she still has this problem. Denies any bleeding. Wants order for breast pump.    O  Temp:  [97.5 °F (36.4 °C)-98.5 °F (36.9 °C)]   Pulse:  []   Resp:  [16-20]   BP: (108-192)/()   SpO2:  [95 %-100 %]    Gen: A&Ox3, NAD  Abd: soft, nontender at level of umbilicus.  Ext: no edema    Lab Results   Component Value Date    WBC 10.55 06/30/2020    HGB 8.8 (L) 06/30/2020    HCT 27.8 (L) 06/30/2020    MCV 82 06/30/2020    PLT 64 (L) 06/30/2020     CMP  Sodium   Date Value Ref Range Status   06/30/2020 137 136 - 145 mmol/L Final     Potassium   Date Value Ref Range Status   06/30/2020 4.0 3.5 - 5.1 mmol/L Final     Chloride   Date Value Ref Range Status   06/30/2020 106 95 - 110 mmol/L Final     CO2   Date Value Ref Range Status   06/30/2020 24 23 - 29 mmol/L Final     Glucose   Date Value Ref Range Status   06/30/2020 74 70 - 110 mg/dL Final     BUN, Bld   Date Value Ref Range Status   06/30/2020 9 6 - 20 mg/dL Final     Creatinine   Date Value Ref Range Status   06/30/2020 0.6 0.5 - 1.4 mg/dL Final     Calcium   Date Value Ref Range Status   06/30/2020 8.2 (L) 8.7 - 10.5 mg/dL Final     Total Protein   Date Value Ref Range Status   06/30/2020 6.1 6.0 - 8.4 g/dL Final     Albumin   Date Value Ref Range Status   06/30/2020 2.2 (L) 3.5 - 5.2 g/dL Final     Total Bilirubin   Date Value Ref Range Status   06/30/2020 0.5 0.1 - 1.0 mg/dL Final     Comment:     For infants and newborns, interpretation of results should be based  on gestational age, weight and in agreement with clinical  observations.  Premature Infant recommended reference ranges:  Up to 24 hours.............<8.0 mg/dL  Up to 48 hours............<12.0 mg/dL  3-5 days..................<15.0 mg/dL  6-29 days.................<15.0 mg/dL       Alkaline Phosphatase   Date Value Ref Range Status   06/30/2020 150 (H) 55 - 135  U/L Final     AST   Date Value Ref Range Status   2020 31 10 - 40 U/L Final     ALT   Date Value Ref Range Status   2020 62 (H) 10 - 44 U/L Final     Anion Gap   Date Value Ref Range Status   2020 7 (L) 8 - 16 mmol/L Final     eGFR if    Date Value Ref Range Status   2020 >60 >60 mL/min/1.73 m^2 Final     eGFR if non    Date Value Ref Range Status   2020 >60 >60 mL/min/1.73 m^2 Final     Comment:     Calculation used to obtain the estimated glomerular filtration  rate (eGFR) is the CKD-EPI equation.        AP: 30 yo now  female s/p , PP course complicated by severe pre-X with HELLP syndrome.  Continue routine pp care   in the NICU  Severe pre-X with HELLP syndrome: BPs still labile on procardia 30mg PO daily  Will keep patient for one more day for now; would like to see improvement of plts and liver enzymes.  HAs: improving with bk ordaz MD

## 2020-06-30 NOTE — LACTATION NOTE
Mom will continue to pump/hand express at least 8+ times/24 hrs for  nicu baby. Symphony pump at bs. Reinforced 8 or more times in 24 hours. bilat 15-20 min at a time.  Reviewed use/cleaning. Stressed importance of hand hygiene & keeping pump kit clean. Will collect, label, store & transport EBM as instructed. Will call for any needs.   More 3ml and 10 ml syringes with caps brought to mom for EBM. Mother  stated she has been expressing between 0.5ml to 6 ml between today and yesterday. Instructed mom she needs EBM labels to label syringes or bottles of EBM before bringing to NICU. Informed RN mom needs ebm labels, order not in yet, Informed NNP order for  labels needed. NNP aware. Mother has RX for breast pump.   1000 discussed mother's medications and l3 risks with NNP. Will discuss with mother. Will inform  Mother  She will need to follow up with pediatrician if takes medications after discharge for infant safety.

## 2020-06-30 NOTE — PLAN OF CARE
POC reviewed with pt around 0750; verbalized acceptance and understanding.  Pt's VS stable.  Remains free from falls and injury.  Pain controlled with ordered meds.  Baby in NICU; set up on electronic breast pump.  Has gone to visit baby once today.  Family at bedside to offer support.  ELLIE.

## 2020-06-30 NOTE — LACTATION NOTE
[facility]  Lactation Note - Mom    SUMMARY     Maternal Assessment    Breast Size Issue: none  Breast Shape: Bilateral:, round  Breast Density: Bilateral:, soft  Areola: Bilateral:, elastic  Nipples: Bilateral:, everted  Left Nipple Symptoms: tender  Right Nipple Symptoms: tender(lanolin given with instructions)  Preferred Pain Scale: number (Numeric Rating Pain Scale)  Comfort/Acceptable Pain Level: 3  Pain Body Location - Side: Right  Pain Body Location - Orientation: lower  Pain Body Location: back  Pain Rating (0-10): Rest: 5  Pain Rating (0-10): Activity: 0  Pain Rating: Rest: 0 - no pain  Pain Rating: Activity: 4 - moderate pain  Pain Radiation to: back  Frequency: intermittent  Quality: aching  Pain Management Interventions: care clustered, pain management plan reviewed with patient/caregiver, pillow support provided, position adjusted, relaxation techniques promoted, quiet environment facilitated  Sleep/Rest/Relaxation: awake  POSS (Pasero Opioid-Induced Sed Scale): 1 - Awake and alert  Fever Reduction/Comfort Measures: medication administered    LATCH Score         Breasts WDL    Breast WDL: WDL  Left Nipple Symptoms: tender  Right Nipple Symptoms: tender(lanolin given with instructions)    Maternal Infant Feeding    Maternal Preparation: breast care, hand hygiene  Maternal Emotional State: independent, relaxed    Lactation Referrals    Lactation Referrals: other (see comments)(has RX for pump [facility] )    Lactation Interventions    Breastfeeding Assistance: electric breast pump used, support offered  Breastfeeding Support: electric breast pump used, encouragement provided, support offered(more syringes with caps to mom:informed NNP need ebm labels)  Breastfeeding Assistance: electric breast pump used, support offered       Breastfeeding Session    Breast Pumping Interventions: early pumping promoted, frequent pumping encouraged    Maternal Information    Person Making Referral:  nurse  Maternal Reason for Referral: breastfeeding currently  Infant Reason for Referral:  infant

## 2020-07-01 VITALS
SYSTOLIC BLOOD PRESSURE: 125 MMHG | TEMPERATURE: 98 F | HEIGHT: 61 IN | WEIGHT: 260 LBS | DIASTOLIC BLOOD PRESSURE: 83 MMHG | OXYGEN SATURATION: 100 % | HEART RATE: 91 BPM | BODY MASS INDEX: 49.09 KG/M2 | RESPIRATION RATE: 18 BRPM

## 2020-07-01 LAB
ALBUMIN SERPL BCP-MCNC: 2.4 G/DL (ref 3.5–5.2)
ALP SERPL-CCNC: 158 U/L (ref 55–135)
ALT SERPL W/O P-5'-P-CCNC: 58 U/L (ref 10–44)
ANION GAP SERPL CALC-SCNC: 7 MMOL/L (ref 8–16)
ANISOCYTOSIS BLD QL SMEAR: SLIGHT
AST SERPL-CCNC: 31 U/L (ref 10–40)
BASOPHILS # BLD AUTO: 0.02 K/UL (ref 0–0.2)
BASOPHILS NFR BLD: 0.2 % (ref 0–1.9)
BILIRUB SERPL-MCNC: 0.4 MG/DL (ref 0.1–1)
BUN SERPL-MCNC: 10 MG/DL (ref 6–20)
CALCIUM SERPL-MCNC: 8.9 MG/DL (ref 8.7–10.5)
CHLORIDE SERPL-SCNC: 104 MMOL/L (ref 95–110)
CO2 SERPL-SCNC: 27 MMOL/L (ref 23–29)
CREAT SERPL-MCNC: 0.7 MG/DL (ref 0.5–1.4)
DIFFERENTIAL METHOD: ABNORMAL
EOSINOPHIL # BLD AUTO: 0.3 K/UL (ref 0–0.5)
EOSINOPHIL NFR BLD: 2.8 % (ref 0–8)
ERYTHROCYTE [DISTWIDTH] IN BLOOD BY AUTOMATED COUNT: 15.9 % (ref 11.5–14.5)
EST. GFR  (AFRICAN AMERICAN): >60 ML/MIN/1.73 M^2
EST. GFR  (NON AFRICAN AMERICAN): >60 ML/MIN/1.73 M^2
GLUCOSE SERPL-MCNC: 78 MG/DL (ref 70–110)
HCT VFR BLD AUTO: 29.8 % (ref 37–48.5)
HGB BLD-MCNC: 9.3 G/DL (ref 12–16)
IMM GRANULOCYTES # BLD AUTO: 0.08 K/UL (ref 0–0.04)
IMM GRANULOCYTES NFR BLD AUTO: 0.8 % (ref 0–0.5)
LYMPHOCYTES # BLD AUTO: 3.2 K/UL (ref 1–4.8)
LYMPHOCYTES NFR BLD: 31.8 % (ref 18–48)
MCH RBC QN AUTO: 25.9 PG (ref 27–31)
MCHC RBC AUTO-ENTMCNC: 31.2 G/DL (ref 32–36)
MCV RBC AUTO: 83 FL (ref 82–98)
MONOCYTES # BLD AUTO: 0.5 K/UL (ref 0.3–1)
MONOCYTES NFR BLD: 5.1 % (ref 4–15)
NEUTROPHILS # BLD AUTO: 6 K/UL (ref 1.8–7.7)
NEUTROPHILS NFR BLD: 59.3 % (ref 38–73)
NRBC BLD-RTO: 0 /100 WBC
PLATELET # BLD AUTO: 110 K/UL (ref 150–350)
PLATELET BLD QL SMEAR: ABNORMAL
PMV BLD AUTO: 13.2 FL (ref 9.2–12.9)
POTASSIUM SERPL-SCNC: 4.3 MMOL/L (ref 3.5–5.1)
PROT SERPL-MCNC: 6.5 G/DL (ref 6–8.4)
RBC # BLD AUTO: 3.59 M/UL (ref 4–5.4)
SODIUM SERPL-SCNC: 138 MMOL/L (ref 136–145)
WBC # BLD AUTO: 10.08 K/UL (ref 3.9–12.7)

## 2020-07-01 PROCEDURE — 36415 COLL VENOUS BLD VENIPUNCTURE: CPT

## 2020-07-01 PROCEDURE — 85025 COMPLETE CBC W/AUTO DIFF WBC: CPT

## 2020-07-01 PROCEDURE — 80053 COMPREHEN METABOLIC PANEL: CPT

## 2020-07-01 PROCEDURE — 99238 HOSP IP/OBS DSCHRG MGMT 30/<: CPT | Mod: ,,, | Performed by: OBSTETRICS & GYNECOLOGY

## 2020-07-01 PROCEDURE — 99238 PR HOSPITAL DISCHARGE DAY,<30 MIN: ICD-10-PCS | Mod: ,,, | Performed by: OBSTETRICS & GYNECOLOGY

## 2020-07-01 NOTE — PLAN OF CARE
Rounded on pt. Stated that she has only BR once right after delivery & has been pumping since due to baby in nicu. Stated that she plans to put baby to breast. BR her 2yo for about 1 month per mom. Offered assistance with BR prior to d/c today-declined/denies need. Offered assist with pumping also-denies need. Stated that she has already arranged to get pump thru insurance from [employer] today. Encouraged to put baby to breast-tips discussed.   Mom will BR or pump/hand express at least 8+ times/24 hrs for baby. Symphony pump at bs. Reviewed use/cleaning. Stressed importance of hand hygiene & keeping pump kit clean. Will collect, label, store & transport EBM as instructed. If BR, mom will monitor for signs of deep latch & adequate fdg; I&O.  Will have baby's weight checked at ped's office in the next couple of days after d/c from hospital as recommended. Discussed available resources in Breastfeeding Guide. Also has NICU Guide & FF Guide. Instructed to call for any questions/needs. Verbalized understanding.

## 2020-07-01 NOTE — PLAN OF CARE
0845 - vss, nad, pain well controlled, tolerating regular diet, passing gas, reports last time she pumped was @ 0100 - encouraged pt to pump frequently for production stimulation.  Pt reports she plans to pump and feed at home.  Poc: pain management as needed, ambulation and po hydration encouraged, d/c home today.  Reviewed poc w/pt.  Pt verbalized understanding.

## 2020-07-01 NOTE — DISCHARGE SUMMARY
[facility]  Obstetrics  Discharge Summary      Patient Name: Brannon Lei  MRN: [id number]  Admission Date: 2020  Hospital Length of Stay: 4 days  Discharge Date and Time:  2020 7:05 AM  Attending Physician: Kaila Cuevas MD   Discharging Provider: Kaila Cuevas MD  Primary Care Provider: Primary Doctor No    HPI: 30 yo  female admitted at 39 weeks gestation in active labor.    Hospital Course: The patient's labor course was uncomplicated. She is now s/p uncomplicated normal spontaneous vaginal delivery. However, soon after her delivery, the patient was noted to have severe range BPs and RUQ pain. Workup revealed severe postpartum preeclampsia with HELLP syndrome.  The patient was started on procardia to improve her blood pressure. RUQ U/S and CTA of head revealed no abnormal findings. The patient's liver enzymes and platelets continued to improve prior to discharge.       Consults (From admission, onward)        Status Ordering Provider     Inpatient consult to [facility]     Provider:  Zen Cunningham III, MD    Completed ANTONIA HUDDLESTON          Final Active Diagnoses:    Diagnosis Date Noted POA    PRINCIPAL PROBLEM:  HELLP syndrome, delivered, current hospitalization [O14.24] 2020 Unknown    Abdominal pain during pregnancy [O26.899, R10.9] 2020 Yes    Preeclampsia, severe, third trimester [O14.13] 2020 Yes      Problems Resolved During this Admission:      Lab Results   Component Value Date    WBC 10.08 2020    HGB 9.3 (L) 2020    HCT 29.8 (L) 2020    MCV 83 2020     (L) 2020       CMP  Sodium   Date Value Ref Range Status   2020 138 136 - 145 mmol/L Final     Potassium   Date Value Ref Range Status   2020 4.3 3.5 - 5.1 mmol/L Final     Chloride   Date Value Ref Range Status   2020 104 95 - 110 mmol/L Final     CO2   Date Value Ref Range Status   2020 27 23 - 29 mmol/L Final      Glucose   Date Value Ref Range Status   07/01/2020 78 70 - 110 mg/dL Final     BUN, Bld   Date Value Ref Range Status   07/01/2020 10 6 - 20 mg/dL Final     Creatinine   Date Value Ref Range Status   07/01/2020 0.7 0.5 - 1.4 mg/dL Final     Calcium   Date Value Ref Range Status   07/01/2020 8.9 8.7 - 10.5 mg/dL Final     Total Protein   Date Value Ref Range Status   07/01/2020 6.5 6.0 - 8.4 g/dL Final     Albumin   Date Value Ref Range Status   07/01/2020 2.4 (L) 3.5 - 5.2 g/dL Final     Total Bilirubin   Date Value Ref Range Status   07/01/2020 0.4 0.1 - 1.0 mg/dL Final     Comment:     For infants and newborns, interpretation of results should be based  on gestational age, weight and in agreement with clinical  observations.  Premature Infant recommended reference ranges:  Up to 24 hours.............<8.0 mg/dL  Up to 48 hours............<12.0 mg/dL  3-5 days..................<15.0 mg/dL  6-29 days.................<15.0 mg/dL       Alkaline Phosphatase   Date Value Ref Range Status   07/01/2020 158 (H) 55 - 135 U/L Final     AST   Date Value Ref Range Status   07/01/2020 31 10 - 40 U/L Final     ALT   Date Value Ref Range Status   07/01/2020 58 (H) 10 - 44 U/L Final     Anion Gap   Date Value Ref Range Status   07/01/2020 7 (L) 8 - 16 mmol/L Final     eGFR if    Date Value Ref Range Status   07/01/2020 >60 >60 mL/min/1.73 m^2 Final     eGFR if non    Date Value Ref Range Status   07/01/2020 >60 >60 mL/min/1.73 m^2 Final     Comment:     Calculation used to obtain the estimated glomerular filtration  rate (eGFR) is the CKD-EPI equation.            Feeding Method: breast    Immunizations     Date Immunization Status Dose Route/Site Given by    06/27/20 0713 MMR Incomplete 0.5 mL Subcutaneous/Left deltoid     06/27/20 0713 Tdap Incomplete 0.5 mL Intramuscular/Left deltoid           Delivery:    Episiotomy: None   Lacerations: None   Repair suture: None   Repair # of packets: 1    Blood loss (ml): 0     Birth information:  YOB: 2019   Time of birth: 4:33 AM   Sex: male   Delivery type: Vaginal, Spontaneous   Gestational Age: 38w0d    Delivery Clinician:      Other providers:       Additional  information:  Forceps:    Vacuum:    Breech:    Observed anomalies      Living?:           APGARS  One minute Five minutes Ten minutes   Skin color:         Heart rate:         Grimace:         Muscle tone:         Breathing:         Totals: 9  9        Placenta: Delivered:       appearance    Pending Diagnostic Studies:     Procedure Component Value Units Date/Time    CBC auto differential [[id number]] Collected: 07/01/20 0627    Order Status: Sent Lab Status: In process Updated: 07/01/20 0628    Specimen: Blood     Comprehensive metabolic panel [[id number]] Collected: 07/01/20 0627    Order Status: Sent Lab Status: In process Updated: 07/01/20 0627    Specimen: Blood           Discharged Condition: good    Disposition:     Follow Up:  Follow-up Information     Kaila Cuevas MD On 7/6/2020.    Specialties: Obstetrics, Obstetrics and Gynecology  Why: 215pm for BP check/postpartum visit  Contact information:  [address]  [address] [place] [address] [id number] [phone]                 Patient Instructions:   No discharge procedures on file.  Medications:  Current Discharge Medication List      START taking these medications    Details   NIFEdipine (PROCARDIA-XL) 30 MG (OSM) 24 hr tablet Take 1 tablet (30 mg total) by mouth once daily.  Qty: 30 tablet, Refills: 0    Comments: .  Associated Diagnoses: Blood pressure elevated without history of HTN      oxyCODONE-acetaminophen (PERCOCET) 5-325 mg per tablet Take 1 tablet by mouth every 4 (four) hours as needed for Pain.  Qty: 20 tablet, Refills: 0    Comments: Quantity prescribed more than 7 day supply? No  Associated Diagnoses: Postpartum pain      !! sertraline (ZOLOFT) 50 MG tablet Take 1 tablet (50 mg total) by mouth once  daily.  Qty: 30 tablet, Refills: 0    Associated Diagnoses: Anxiety      !! topiramate (TOPAMAX) 25 MG tablet Take 1 tablet (25 mg total) by mouth nightly.  Qty: 30 tablet, Refills: 0    Associated Diagnoses: Cluster headache, not intractable, unspecified chronicity pattern       !! - Potential duplicate medications found. Please discuss with provider.      CONTINUE these medications which have NOT CHANGED    Details   acetaminophen (TYLENOL) 500 MG tablet Take 1 tablet (500 mg total) by mouth every 6 (six) hours as needed for Pain.  Qty: 30 tablet, Refills: 0      butalbital-acetaminophen-caffeine -40 mg (FIORICET, ESGIC) -40 mg per tablet Take 1 tablet by mouth every 6 (six) hours as needed for Pain (headache).  Qty: 30 tablet, Refills: 0    Associated Diagnoses: Episodic cluster headache, not intractable      pantoprazole (PROTONIX) 40 MG tablet Take 1 tablet (40 mg total) by mouth once daily.  Qty: 30 tablet, Refills: 12      promethazine (PHENERGAN) 25 MG tablet Take 1 tablet (25 mg total) by mouth every 6 (six) hours as needed for Nausea.  Qty: 15 tablet, Refills: 0      !! sertraline (ZOLOFT) 50 MG tablet       !! topiramate (TOPAMAX) 25 MG tablet Take 25 mg by mouth once daily.        !! - Potential duplicate medications found. Please discuss with provider.          Kaila Cuevas MD  Obstetrics [facility]

## 2020-07-01 NOTE — NURSING
Patient complaining of lower back pain d/t epidural. Provided with and placed abdominal binder on pt. Pt declines pain medication at this time. WCTM

## 2020-07-01 NOTE — LACTATION NOTE
[facility]  Lactation Note - Mom    SUMMARY     Maternal Assessment    Breast Size Issue: none  Breast Shape: Bilateral:, round  Breast Density: Bilateral:, full, other (see comments)(softer after pumping)  Areola: Bilateral:, elastic  Nipples: Bilateral:, everted  Left Nipple Symptoms: tender  Right Nipple Symptoms: tender      LATCH Score         Breasts WDL    Breast WDL: WDL except, nipple symptoms  Left Nipple Symptoms: tender  Right Nipple Symptoms: tender    Maternal Infant Feeding    Maternal Preparation: breast care, hand hygiene  Maternal Emotional State: independent, relaxed(offered assist w/BR&pumping-denies need)  Pain with Feeding: other (see comments)(tender with pumping;enc to adjust sxn control to comfort lev)    Lactation Referrals    Lactation Referrals: outpatient lactation program, pediatric care provider  Outpatient Lactation Program Lactation Follow-up Date/Time: enc to call warmline w/questions prn  Pediatric Care Provider Lactation Follow-up Date/Time: within 2-3 days;Dr Rosario    Lactation Interventions    Breast Care: Breastfeeding: breast milk to nipples, lanolin to nipples  Breastfeeding Assistance: feeding cue recognition promoted, feeding on demand promoted, support offered(denies need for assist with BR or pumping)  Breastfeeding Support: electric breast pump used, encouragement provided, support offered(more syringes with caps to mom:informed NNP need ebm labels)  Breast Care: Breastfeeding: breast milk to nipples, lanolin to nipples  Breastfeeding Assistance: feeding cue recognition promoted, feeding on demand promoted, support offered(denies need for assist with BR or pumping)  Breastfeeding Support: diary/feeding log utilized, encouragement provided, lactation counseling provided, maternal hydration promoted, maternal nutrition promoted, maternal rest encouraged       Breastfeeding Session    Breast Pumping Interventions: frequent pumping encouraged(if not  putting baby to BR)    Maternal Information    Person Making Referral: nurse  Maternal Reason for Referral: breastfeeding currently  Infant Reason for Referral:  infant

## 2020-07-01 NOTE — PROGRESS NOTES
AM labs    Lab Results   Component Value Date    WBC 10.08 07/01/2020    HGB 9.3 (L) 07/01/2020    HCT 29.8 (L) 07/01/2020    MCV 83 07/01/2020     (L) 07/01/2020       CMP  Sodium   Date Value Ref Range Status   07/01/2020 138 136 - 145 mmol/L Final     Potassium   Date Value Ref Range Status   07/01/2020 4.3 3.5 - 5.1 mmol/L Final     Chloride   Date Value Ref Range Status   07/01/2020 104 95 - 110 mmol/L Final     CO2   Date Value Ref Range Status   07/01/2020 27 23 - 29 mmol/L Final     Glucose   Date Value Ref Range Status   07/01/2020 78 70 - 110 mg/dL Final     BUN, Bld   Date Value Ref Range Status   07/01/2020 10 6 - 20 mg/dL Final     Creatinine   Date Value Ref Range Status   07/01/2020 0.7 0.5 - 1.4 mg/dL Final     Calcium   Date Value Ref Range Status   07/01/2020 8.9 8.7 - 10.5 mg/dL Final     Total Protein   Date Value Ref Range Status   07/01/2020 6.5 6.0 - 8.4 g/dL Final     Albumin   Date Value Ref Range Status   07/01/2020 2.4 (L) 3.5 - 5.2 g/dL Final     Total Bilirubin   Date Value Ref Range Status   07/01/2020 0.4 0.1 - 1.0 mg/dL Final     Comment:     For infants and newborns, interpretation of results should be based  on gestational age, weight and in agreement with clinical  observations.  Premature Infant recommended reference ranges:  Up to 24 hours.............<8.0 mg/dL  Up to 48 hours............<12.0 mg/dL  3-5 days..................<15.0 mg/dL  6-29 days.................<15.0 mg/dL       Alkaline Phosphatase   Date Value Ref Range Status   07/01/2020 158 (H) 55 - 135 U/L Final     AST   Date Value Ref Range Status   07/01/2020 31 10 - 40 U/L Final     ALT   Date Value Ref Range Status   07/01/2020 58 (H) 10 - 44 U/L Final     Anion Gap   Date Value Ref Range Status   07/01/2020 7 (L) 8 - 16 mmol/L Final     eGFR if    Date Value Ref Range Status   07/01/2020 >60 >60 mL/min/1.73 m^2 Final     eGFR if non    Date Value Ref Range Status   07/01/2020  >60 >60 mL/min/1.73 m^2 Final     Comment:     Calculation used to obtain the estimated glomerular filtration  rate (eGFR) is the CKD-EPI equation.          Patient stable for d/c to home    margarito ordaz MD

## 2020-07-01 NOTE — NURSING
Reviewed discharge instructions and meds w/pt.  Pt verbalized understanding of instructions and meds.  Pt demonstrates ability to care for herself and for infant.  Vss, nad, pain well controlled, tolerating regular diet, passing gas, appears to be bonding well w/infant upon discharge.  Pt waiting on infant's d/c orders.  Pt will call when ready for a wheelchair.

## 2020-07-01 NOTE — PROGRESS NOTES
PPD #4    S: patient doing ok. Thinks that percocet may have been attributing to her dizziness.    O  Temp:  [97.5 °F (36.4 °C)-99.4 °F (37.4 °C)]   Pulse:  []   Resp:  [18-19]   BP: (116-159)/(57-99)   SpO2:  [97 %-100 %]    Gen: A&Ox3, NAD  Exam: deferred    AM labs pending    Lab Results   Component Value Date    WBC 10.55 06/30/2020    HGB 8.8 (L) 06/30/2020    HCT 27.8 (L) 06/30/2020    MCV 82 06/30/2020    PLT 64 (L) 06/30/2020     CMP  Sodium   Date Value Ref Range Status   06/30/2020 137 136 - 145 mmol/L Final     Potassium   Date Value Ref Range Status   06/30/2020 4.0 3.5 - 5.1 mmol/L Final     Chloride   Date Value Ref Range Status   06/30/2020 106 95 - 110 mmol/L Final     CO2   Date Value Ref Range Status   06/30/2020 24 23 - 29 mmol/L Final     Glucose   Date Value Ref Range Status   06/30/2020 74 70 - 110 mg/dL Final     BUN, Bld   Date Value Ref Range Status   06/30/2020 9 6 - 20 mg/dL Final     Creatinine   Date Value Ref Range Status   06/30/2020 0.6 0.5 - 1.4 mg/dL Final     Calcium   Date Value Ref Range Status   06/30/2020 8.2 (L) 8.7 - 10.5 mg/dL Final     Total Protein   Date Value Ref Range Status   06/30/2020 6.1 6.0 - 8.4 g/dL Final     Albumin   Date Value Ref Range Status   06/30/2020 2.2 (L) 3.5 - 5.2 g/dL Final     Total Bilirubin   Date Value Ref Range Status   06/30/2020 0.5 0.1 - 1.0 mg/dL Final     Comment:     For infants and newborns, interpretation of results should be based  on gestational age, weight and in agreement with clinical  observations.  Premature Infant recommended reference ranges:  Up to 24 hours.............<8.0 mg/dL  Up to 48 hours............<12.0 mg/dL  3-5 days..................<15.0 mg/dL  6-29 days.................<15.0 mg/dL       Alkaline Phosphatase   Date Value Ref Range Status   06/30/2020 150 (H) 55 - 135 U/L Final     AST   Date Value Ref Range Status   06/30/2020 31 10 - 40 U/L Final     ALT   Date Value Ref Range Status   06/30/2020 62 (H) 10  - 44 U/L Final     Anion Gap   Date Value Ref Range Status   2020 7 (L) 8 - 16 mmol/L Final     eGFR if    Date Value Ref Range Status   2020 >60 >60 mL/min/1.73 m^2 Final     eGFR if non    Date Value Ref Range Status   2020 >60 >60 mL/min/1.73 m^2 Final     Comment:     Calculation used to obtain the estimated glomerular filtration  rate (eGFR) is the CKD-EPI equation.        AP: 30 yo now  female s/p , PP course complicated by severe pre-X with HELLP syndrome.  Continue routine pp care  [place] in the NICU  Severe pre-X with HELLP syndrome: BPs normotensive now on procardia 30mg PO daily  AM labs pending  HAs: improving with topamax    Likely d/c to home once AM labs have resulted and been reviewed    margarito ordaz MD

## 2020-07-03 ENCOUNTER — TELEPHONE (OUTPATIENT)
Dept: OBSTETRICS AND GYNECOLOGY | Facility: CLINIC | Age: 30
End: 2020-07-03

## 2020-07-03 NOTE — TELEPHONE ENCOUNTER
Pt states things are going well. States she is mainly breastfeeding but supplements after occasionally as needed. Had question about tenderness under arm pit. States when breasts are full it is more tender. Discussed possibility of extra breast tissue in the area. Encouraged used of warm moist compress then cold application after breastfeeding for comfort. States understanding. States baby went to the pediatrician and is gaining weight appropriately. Encouraged to continue with frequent weight checks and tracking I&Os. States ok and appreciation.

## 2020-07-06 ENCOUNTER — LAB VISIT (OUTPATIENT)
Dept: LAB | Facility: HOSPITAL | Age: 30
End: 2020-07-06
Attending: OBSTETRICS & GYNECOLOGY
Payer: MEDICAID

## 2020-07-06 ENCOUNTER — ROUTINE PRENATAL (OUTPATIENT)
Dept: OBSTETRICS AND GYNECOLOGY | Facility: CLINIC | Age: 30
End: 2020-07-06
Payer: MEDICAID

## 2020-07-06 VITALS
WEIGHT: 225.31 LBS | SYSTOLIC BLOOD PRESSURE: 130 MMHG | DIASTOLIC BLOOD PRESSURE: 84 MMHG | BODY MASS INDEX: 42.57 KG/M2

## 2020-07-06 LAB
ALBUMIN SERPL BCP-MCNC: 3.3 G/DL (ref 3.5–5.2)
ALP SERPL-CCNC: 142 U/L (ref 55–135)
ALT SERPL W/O P-5'-P-CCNC: 30 U/L (ref 10–44)
ANION GAP SERPL CALC-SCNC: 11 MMOL/L (ref 8–16)
AST SERPL-CCNC: 30 U/L (ref 10–40)
BASOPHILS # BLD AUTO: 0.04 K/UL (ref 0–0.2)
BASOPHILS NFR BLD: 0.5 % (ref 0–1.9)
BILIRUB SERPL-MCNC: 0.4 MG/DL (ref 0.1–1)
BUN SERPL-MCNC: 12 MG/DL (ref 6–20)
CALCIUM SERPL-MCNC: 9.5 MG/DL (ref 8.7–10.5)
CHLORIDE SERPL-SCNC: 102 MMOL/L (ref 95–110)
CO2 SERPL-SCNC: 23 MMOL/L (ref 23–29)
CREAT SERPL-MCNC: 0.8 MG/DL (ref 0.5–1.4)
DIFFERENTIAL METHOD: ABNORMAL
EOSINOPHIL # BLD AUTO: 0.1 K/UL (ref 0–0.5)
EOSINOPHIL NFR BLD: 1 % (ref 0–8)
ERYTHROCYTE [DISTWIDTH] IN BLOOD BY AUTOMATED COUNT: 15.9 % (ref 11.5–14.5)
EST. GFR  (AFRICAN AMERICAN): >60 ML/MIN/1.73 M^2
EST. GFR  (NON AFRICAN AMERICAN): >60 ML/MIN/1.73 M^2
GLUCOSE SERPL-MCNC: 79 MG/DL (ref 70–110)
HCT VFR BLD AUTO: 34.8 % (ref 37–48.5)
HGB BLD-MCNC: 10.9 G/DL (ref 12–16)
IMM GRANULOCYTES # BLD AUTO: 0.03 K/UL (ref 0–0.04)
IMM GRANULOCYTES NFR BLD AUTO: 0.4 % (ref 0–0.5)
LYMPHOCYTES # BLD AUTO: 2.2 K/UL (ref 1–4.8)
LYMPHOCYTES NFR BLD: 28.4 % (ref 18–48)
MCH RBC QN AUTO: 25.8 PG (ref 27–31)
MCHC RBC AUTO-ENTMCNC: 31.3 G/DL (ref 32–36)
MCV RBC AUTO: 82 FL (ref 82–98)
MONOCYTES # BLD AUTO: 0.4 K/UL (ref 0.3–1)
MONOCYTES NFR BLD: 4.7 % (ref 4–15)
NEUTROPHILS # BLD AUTO: 5 K/UL (ref 1.8–7.7)
NEUTROPHILS NFR BLD: 65 % (ref 38–73)
NRBC BLD-RTO: 0 /100 WBC
PLATELET # BLD AUTO: 338 K/UL (ref 150–350)
PMV BLD AUTO: 11.1 FL (ref 9.2–12.9)
POTASSIUM SERPL-SCNC: 4 MMOL/L (ref 3.5–5.1)
PROT SERPL-MCNC: 7.8 G/DL (ref 6–8.4)
RBC # BLD AUTO: 4.23 M/UL (ref 4–5.4)
SODIUM SERPL-SCNC: 136 MMOL/L (ref 136–145)
WBC # BLD AUTO: 7.67 K/UL (ref 3.9–12.7)

## 2020-07-06 PROCEDURE — 36415 COLL VENOUS BLD VENIPUNCTURE: CPT

## 2020-07-06 PROCEDURE — 99999 PR PBB SHADOW E&M-EST. PATIENT-LVL III: ICD-10-PCS | Mod: PBBFAC,,, | Performed by: OBSTETRICS & GYNECOLOGY

## 2020-07-06 PROCEDURE — 80053 COMPREHEN METABOLIC PANEL: CPT

## 2020-07-06 PROCEDURE — 85025 COMPLETE CBC W/AUTO DIFF WBC: CPT

## 2020-07-06 PROCEDURE — 99999 PR PBB SHADOW E&M-EST. PATIENT-LVL III: CPT | Mod: PBBFAC,,, | Performed by: OBSTETRICS & GYNECOLOGY

## 2020-07-06 PROCEDURE — 99213 OFFICE O/P EST LOW 20 MIN: CPT | Mod: PBBFAC,PO | Performed by: OBSTETRICS & GYNECOLOGY

## 2020-07-06 PROCEDURE — 59430 PR CARE AFTER DELIVERY ONLY: ICD-10-PCS | Mod: ,,, | Performed by: OBSTETRICS & GYNECOLOGY

## 2020-07-06 NOTE — PROGRESS NOTES
The patient is s/p  on 2020. Her postpartum course was complicated by severe pre-X and HELLP syndrome.  The patient was started on procardia.  Today, she presents for BP check.  Reports that she now takes procardia at nighttime and no longer has HAs.  No other complaints today.  Baby is doing well.    VS  /84   Wt 102.2 kg (225 lb 5 oz)   LMP 10/06/2019   BMI 42.57 kg/m²   Gen: A&ox3, nAD  Large bruising noted on the inner arms bilaterally.    Will have CBC and CMP today.  Continue procardia qhs    F/u in 2 wks for BP check    margarito ordaz MD

## 2020-07-20 ENCOUNTER — POSTPARTUM VISIT (OUTPATIENT)
Dept: OBSTETRICS AND GYNECOLOGY | Facility: CLINIC | Age: 30
End: 2020-07-20
Payer: MEDICAID

## 2020-07-20 VITALS — DIASTOLIC BLOOD PRESSURE: 90 MMHG | SYSTOLIC BLOOD PRESSURE: 118 MMHG

## 2020-07-20 DIAGNOSIS — R10.84 GENERALIZED ABDOMINAL PAIN: ICD-10-CM

## 2020-07-20 PROCEDURE — 0503F POSTPARTUM CARE VISIT: CPT | Mod: ,,, | Performed by: OBSTETRICS & GYNECOLOGY

## 2020-07-20 PROCEDURE — 0503F PR POSTPARTUM CARE VISIT: ICD-10-PCS | Mod: ,,, | Performed by: OBSTETRICS & GYNECOLOGY

## 2020-07-20 PROCEDURE — 99999 PR PBB SHADOW E&M-EST. PATIENT-LVL I: ICD-10-PCS | Mod: PBBFAC,,, | Performed by: OBSTETRICS & GYNECOLOGY

## 2020-07-20 PROCEDURE — 99211 OFF/OP EST MAY X REQ PHY/QHP: CPT | Mod: PBBFAC,PO | Performed by: OBSTETRICS & GYNECOLOGY

## 2020-07-20 PROCEDURE — 99999 PR PBB SHADOW E&M-EST. PATIENT-LVL I: CPT | Mod: PBBFAC,,, | Performed by: OBSTETRICS & GYNECOLOGY

## 2020-07-20 RX ORDER — IBUPROFEN 800 MG/1
800 TABLET ORAL EVERY 8 HOURS PRN
Qty: 30 TABLET | Refills: 2 | Status: SHIPPED | OUTPATIENT
Start: 2020-07-20 | End: 2021-01-26

## 2020-07-20 NOTE — PROGRESS NOTES
The patient is s/p  on 2020. Her postpartum course was complicated by severe pre-X and HELLP syndrome.  The patient was started on procardia.  Today, she presents for BP check.  Reports fainting spell in the shower this weekend.  Reports abdominal cramping/pain.  No other complaints today.  Baby is doing well.    VS  BP (!) 118/90   Gen: A&ox3, nAD  Exam; deferred    rx fo motrin sent  Will stop procardia for now  F/u in 1 week BP check  Contraception: wants ortho evra patch    margarito ordaz MD

## 2020-07-27 ENCOUNTER — OFFICE VISIT (OUTPATIENT)
Dept: OBSTETRICS AND GYNECOLOGY | Facility: CLINIC | Age: 30
End: 2020-07-27
Payer: MEDICAID

## 2020-07-27 VITALS
DIASTOLIC BLOOD PRESSURE: 71 MMHG | WEIGHT: 227.31 LBS | BODY MASS INDEX: 42.95 KG/M2 | SYSTOLIC BLOOD PRESSURE: 118 MMHG

## 2020-07-27 DIAGNOSIS — Z30.016 ENCOUNTER FOR INITIAL PRESCRIPTION OF TRANSDERMAL PATCH HORMONAL CONTRACEPTIVE DEVICE: Primary | ICD-10-CM

## 2020-07-27 PROCEDURE — 99999 PR PBB SHADOW E&M-EST. PATIENT-LVL III: ICD-10-PCS | Mod: PBBFAC,,, | Performed by: OBSTETRICS & GYNECOLOGY

## 2020-07-27 PROCEDURE — 99213 OFFICE O/P EST LOW 20 MIN: CPT | Mod: PBBFAC,PO | Performed by: OBSTETRICS & GYNECOLOGY

## 2020-07-27 PROCEDURE — 0503F PR POSTPARTUM CARE VISIT: ICD-10-PCS | Mod: ,,, | Performed by: OBSTETRICS & GYNECOLOGY

## 2020-07-27 PROCEDURE — 0503F POSTPARTUM CARE VISIT: CPT | Mod: ,,, | Performed by: OBSTETRICS & GYNECOLOGY

## 2020-07-27 PROCEDURE — 99999 PR PBB SHADOW E&M-EST. PATIENT-LVL III: CPT | Mod: PBBFAC,,, | Performed by: OBSTETRICS & GYNECOLOGY

## 2020-07-27 RX ORDER — NORELGESTROMIN AND ETHINYL ESTRADIOL 35; 150 UG/MG; UG/MG
PATCH TRANSDERMAL
Qty: 3 PATCH | Refills: 11 | Status: SHIPPED | OUTPATIENT
Start: 2020-07-27 | End: 2021-08-20

## 2020-07-27 NOTE — PROGRESS NOTES
The patient is s/p  on 2020. Her postpartum course was complicated by severe pre-X and HELLP syndrome.  She was discontinued on procardia last week.  Today, she presents for BP check.  Doing well today.   No complaints.  Baby is doing well.    VS  /71   Wt 103.1 kg (227 lb 4.7 oz)   LMP  (LMP Unknown)   BMI 42.95 kg/m²   Gen: A&ox3, nAD  Exam; deferred    Contraception: wants ortho evra patch - rx sent, will start in 2 weeks.    F/u in dec 2020 for annual exam    margarito ordaz MD

## 2020-12-19 NOTE — PROGRESS NOTES
Good fetal movement. Reports LE swelling.  Growth scan on 3/29/2018 is at 51%  Fundal height today is 40cm.    25 yo  female @ 33.5 wks by first trimester sono (EDC  who presents for f/u OB visit.     AP:  1) SIUP (it's a boy)  -anatomy with MFM  -Rh positive  -genetic testing: negative  -consents for vag/blood signed 2018  -unsure of circ but consents signed 2018  -1 hr GTT wnl     2) h/o SAB     3) obesity     4) PP  Breastfeeding  Contraception: OCPS    F/u in 1 wks growth scan  F/u in 2 wks OB visit    margarito ordaz MD        
Patient complains of swelling   
236.9

## 2021-11-19 ENCOUNTER — OFFICE VISIT (OUTPATIENT)
Dept: URGENT CARE | Facility: CLINIC | Age: 31
End: 2021-11-19
Payer: MEDICAID

## 2021-11-19 VITALS
RESPIRATION RATE: 15 BRPM | BODY MASS INDEX: 43.99 KG/M2 | TEMPERATURE: 99 F | HEIGHT: 61 IN | WEIGHT: 233 LBS | DIASTOLIC BLOOD PRESSURE: 82 MMHG | HEART RATE: 102 BPM | OXYGEN SATURATION: 98 % | SYSTOLIC BLOOD PRESSURE: 122 MMHG

## 2021-11-19 DIAGNOSIS — J06.9 VIRAL URI: Primary | ICD-10-CM

## 2021-11-19 DIAGNOSIS — R50.9 FEVER, UNSPECIFIED FEVER CAUSE: ICD-10-CM

## 2021-11-19 DIAGNOSIS — J02.9 PHARYNGITIS, UNSPECIFIED ETIOLOGY: ICD-10-CM

## 2021-11-19 LAB
CTP QC/QA: YES
MOLECULAR STREP A: NEGATIVE
POC MOLECULAR INFLUENZA A AGN: NEGATIVE
POC MOLECULAR INFLUENZA B AGN: NEGATIVE
SARS-COV-2 RDRP RESP QL NAA+PROBE: NEGATIVE

## 2021-11-19 PROCEDURE — 99203 OFFICE O/P NEW LOW 30 MIN: CPT | Mod: S$GLB,,, | Performed by: NURSE PRACTITIONER

## 2021-11-19 PROCEDURE — U0002 COVID-19 LAB TEST NON-CDC: HCPCS | Mod: QW,S$GLB,, | Performed by: NURSE PRACTITIONER

## 2021-11-19 PROCEDURE — 87651 STREP A DNA AMP PROBE: CPT | Mod: QW,S$GLB,, | Performed by: NURSE PRACTITIONER

## 2021-11-19 PROCEDURE — 87651 POCT STREP A MOLECULAR: ICD-10-PCS | Mod: QW,S$GLB,, | Performed by: NURSE PRACTITIONER

## 2021-11-19 PROCEDURE — 87502 POCT INFLUENZA A/B MOLECULAR: ICD-10-PCS | Mod: QW,S$GLB,, | Performed by: NURSE PRACTITIONER

## 2021-11-19 PROCEDURE — 99203 PR OFFICE/OUTPT VISIT, NEW, LEVL III, 30-44 MIN: ICD-10-PCS | Mod: S$GLB,,, | Performed by: NURSE PRACTITIONER

## 2021-11-19 PROCEDURE — 87502 INFLUENZA DNA AMP PROBE: CPT | Mod: QW,S$GLB,, | Performed by: NURSE PRACTITIONER

## 2021-11-19 PROCEDURE — U0002: ICD-10-PCS | Mod: QW,S$GLB,, | Performed by: NURSE PRACTITIONER

## 2022-11-29 ENCOUNTER — HOSPITAL ENCOUNTER (EMERGENCY)
Facility: HOSPITAL | Age: 32
Discharge: HOME | End: 2022-11-30
Attending: EMERGENCY MEDICINE
Payer: MEDICAID

## 2022-11-29 DIAGNOSIS — T88.7XXA MEDICATION SIDE EFFECT: Primary | ICD-10-CM

## 2022-11-29 DIAGNOSIS — R11.2 NAUSEA AND VOMITING, UNSPECIFIED VOMITING TYPE: ICD-10-CM

## 2022-11-29 LAB
ALBUMIN SERPL BCP-MCNC: 3.5 G/DL (ref 3.5–5.2)
ALP SERPL-CCNC: 59 U/L (ref 55–135)
ALT SERPL W/O P-5'-P-CCNC: 9 U/L (ref 10–44)
ANION GAP SERPL CALC-SCNC: 16 MMOL/L (ref 8–16)
AST SERPL-CCNC: 11 U/L (ref 10–40)
B-HCG UR QL: NEGATIVE
BASOPHILS # BLD AUTO: 0.04 K/UL (ref 0–0.2)
BASOPHILS NFR BLD: 0.4 % (ref 0–1.9)
BILIRUB SERPL-MCNC: 0.5 MG/DL (ref 0.1–1)
BUN SERPL-MCNC: 11 MG/DL (ref 6–20)
CALCIUM SERPL-MCNC: 9.8 MG/DL (ref 8.7–10.5)
CHLORIDE SERPL-SCNC: 102 MMOL/L (ref 95–110)
CK SERPL-CCNC: 82 U/L (ref 20–180)
CO2 SERPL-SCNC: 17 MMOL/L (ref 23–29)
CREAT SERPL-MCNC: 0.7 MG/DL (ref 0.5–1.4)
CTP QC/QA: YES
DIFFERENTIAL METHOD: NORMAL
EOSINOPHIL # BLD AUTO: 0 K/UL (ref 0–0.5)
EOSINOPHIL NFR BLD: 0.3 % (ref 0–8)
ERYTHROCYTE [DISTWIDTH] IN BLOOD BY AUTOMATED COUNT: 13.2 % (ref 11.5–14.5)
EST. GFR  (NO RACE VARIABLE): >60 ML/MIN/1.73 M^2
GLUCOSE SERPL-MCNC: 96 MG/DL (ref 70–110)
HCT VFR BLD AUTO: 40.8 % (ref 37–48.5)
HCV AB SERPL QL IA: NORMAL
HGB BLD-MCNC: 13.5 G/DL (ref 12–16)
HIV 1+2 AB+HIV1 P24 AG SERPL QL IA: NORMAL
IMM GRANULOCYTES # BLD AUTO: 0.03 K/UL (ref 0–0.04)
IMM GRANULOCYTES NFR BLD AUTO: 0.3 % (ref 0–0.5)
INFLUENZA A, MOLECULAR: NOT DETECTED
INFLUENZA B, MOLECULAR: NOT DETECTED
LIPASE SERPL-CCNC: 11 U/L (ref 4–60)
LYMPHOCYTES # BLD AUTO: 2 K/UL (ref 1–4.8)
LYMPHOCYTES NFR BLD: 22.9 % (ref 18–48)
MAGNESIUM SERPL-MCNC: 1.7 MG/DL (ref 1.6–2.6)
MCH RBC QN AUTO: 29 PG (ref 27–31)
MCHC RBC AUTO-ENTMCNC: 33.1 G/DL (ref 32–36)
MCV RBC AUTO: 88 FL (ref 82–98)
MONOCYTES # BLD AUTO: 0.4 K/UL (ref 0.3–1)
MONOCYTES NFR BLD: 4.8 % (ref 4–15)
NEUTROPHILS # BLD AUTO: 6.3 K/UL (ref 1.8–7.7)
NEUTROPHILS NFR BLD: 71.3 % (ref 38–73)
NRBC BLD-RTO: 0 /100 WBC
PLATELET # BLD AUTO: 239 K/UL (ref 150–450)
PMV BLD AUTO: 11.6 FL (ref 9.2–12.9)
POCT GLUCOSE: 83 MG/DL (ref 70–110)
POTASSIUM SERPL-SCNC: 3.4 MMOL/L (ref 3.5–5.1)
PROT SERPL-MCNC: 8 G/DL (ref 6–8.4)
RBC # BLD AUTO: 4.65 M/UL (ref 4–5.4)
RSV AG BY MOLECULAR METHOD: NOT DETECTED
SARS-COV-2 RNA RESP QL NAA+PROBE: NOT DETECTED
SODIUM SERPL-SCNC: 135 MMOL/L (ref 136–145)
WBC # BLD AUTO: 8.91 K/UL (ref 3.9–12.7)

## 2022-11-29 PROCEDURE — 81025 URINE PREGNANCY TEST: CPT | Performed by: PHYSICIAN ASSISTANT

## 2022-11-29 PROCEDURE — 96374 THER/PROPH/DIAG INJ IV PUSH: CPT

## 2022-11-29 PROCEDURE — 83735 ASSAY OF MAGNESIUM: CPT | Performed by: PHYSICIAN ASSISTANT

## 2022-11-29 PROCEDURE — 85025 COMPLETE CBC W/AUTO DIFF WBC: CPT | Performed by: PHYSICIAN ASSISTANT

## 2022-11-29 PROCEDURE — 96361 HYDRATE IV INFUSION ADD-ON: CPT

## 2022-11-29 PROCEDURE — 82962 GLUCOSE BLOOD TEST: CPT

## 2022-11-29 PROCEDURE — 99284 EMERGENCY DEPT VISIT MOD MDM: CPT | Mod: CS,,, | Performed by: PHYSICIAN ASSISTANT

## 2022-11-29 PROCEDURE — 99284 EMERGENCY DEPT VISIT MOD MDM: CPT | Mod: 25

## 2022-11-29 PROCEDURE — 63600175 PHARM REV CODE 636 W HCPCS: Performed by: PHYSICIAN ASSISTANT

## 2022-11-29 PROCEDURE — 82550 ASSAY OF CK (CPK): CPT | Performed by: PHYSICIAN ASSISTANT

## 2022-11-29 PROCEDURE — 80053 COMPREHEN METABOLIC PANEL: CPT | Performed by: PHYSICIAN ASSISTANT

## 2022-11-29 PROCEDURE — 0241U SARS-COV2 (COVID) WITH FLU/RSV BY PCR: CPT | Performed by: PHYSICIAN ASSISTANT

## 2022-11-29 PROCEDURE — 99284 PR EMERGENCY DEPT VISIT,LEVEL IV: ICD-10-PCS | Mod: CS,,, | Performed by: PHYSICIAN ASSISTANT

## 2022-11-29 PROCEDURE — 86803 HEPATITIS C AB TEST: CPT | Performed by: EMERGENCY MEDICINE

## 2022-11-29 PROCEDURE — 25000003 PHARM REV CODE 250: Performed by: PHYSICIAN ASSISTANT

## 2022-11-29 PROCEDURE — 83690 ASSAY OF LIPASE: CPT | Performed by: PHYSICIAN ASSISTANT

## 2022-11-29 PROCEDURE — 87389 HIV-1 AG W/HIV-1&-2 AB AG IA: CPT | Performed by: EMERGENCY MEDICINE

## 2022-11-29 RX ORDER — METOCLOPRAMIDE HYDROCHLORIDE 5 MG/ML
10 INJECTION INTRAMUSCULAR; INTRAVENOUS
Status: COMPLETED | OUTPATIENT
Start: 2022-11-29 | End: 2022-11-29

## 2022-11-29 RX ADMIN — POTASSIUM BICARBONATE 20 MEQ: 391 TABLET, EFFERVESCENT ORAL at 11:11

## 2022-11-29 RX ADMIN — SODIUM CHLORIDE, SODIUM LACTATE, POTASSIUM CHLORIDE, AND CALCIUM CHLORIDE 1000 ML: .6; .31; .03; .02 INJECTION, SOLUTION INTRAVENOUS at 10:11

## 2022-11-29 RX ADMIN — METOCLOPRAMIDE 10 MG: 5 INJECTION, SOLUTION INTRAMUSCULAR; INTRAVENOUS at 10:11

## 2022-11-29 NOTE — Clinical Note
"Brannon Smith" Quique was seen and treated in our emergency department on 11/29/2022.  She may return to work on 12/01/2022.       If you have any questions or concerns, please don't hesitate to call.      Suzanne Cox PA-C"

## 2022-11-30 VITALS
RESPIRATION RATE: 18 BRPM | OXYGEN SATURATION: 98 % | DIASTOLIC BLOOD PRESSURE: 86 MMHG | TEMPERATURE: 98 F | SYSTOLIC BLOOD PRESSURE: 123 MMHG | HEART RATE: 82 BPM

## 2022-11-30 LAB — POCT GLUCOSE: 98 MG/DL (ref 70–110)

## 2022-11-30 RX ORDER — ONDANSETRON 4 MG/1
4 TABLET, ORALLY DISINTEGRATING ORAL EVERY 6 HOURS PRN
Qty: 12 TABLET | Refills: 0 | Status: SHIPPED | OUTPATIENT
Start: 2022-11-30 | End: 2022-11-30 | Stop reason: SDUPTHER

## 2022-11-30 RX ORDER — ONDANSETRON 4 MG/1
4 TABLET, ORALLY DISINTEGRATING ORAL EVERY 6 HOURS PRN
Qty: 12 TABLET | Refills: 0 | Status: SHIPPED | OUTPATIENT
Start: 2022-11-30

## 2022-11-30 NOTE — DISCHARGE INSTRUCTIONS
You potassium was mildly low, but the rest of your electrolytes were normal. You do not have kidney or liver failure. You do not have pancreatitis.   Avoid semaglutide until you follow up.  Take zofran for your nausea every 6 hours as needed.   Start soft/fluid diet (bland foods) and advance to solids as tolerated.   Stay hydrated by drinking plenty of fluids (2 liters for females and 3 liters for males on a daily basis).  Follow up with your provider.  Return to the ER for new or worsening symptoms.

## 2022-11-30 NOTE — ED PROVIDER NOTES
Encounter Date: 11/29/2022       History     Chief Complaint   Patient presents with    Vomiting     N/V starting today, states accidentally took too much of her Ozempic yeserday. Took 4.8 mg instead of 2.4 mg. Given 4 zofran with EMS.      9:23 PM  Patient is a 32-year-old female with a history of obesity on semaglutide injection for the past 6 months, migraines who presents to [facility] ED with nausea, vomiting.    Patient was taking 1.2 mL (2.4 mg) of semaglutide injection once a week for about 2 months. She has been on this for about 6 months. She thought she had to increase her dose to 2.4 mL (4.8 mg) last night and gave herself an injection at 9 PM, 24 hours ago; notes that she missed it for 2 weeks. Woke up this morning with symptoms.     She feels very nauseated.  Threw up about 7-8 times without blood.  Has had associated dizziness.  Has chronic headaches and is having 1 currently to her posterior head that started this morning.  Slow in onset.  Improves with vomiting.  8/10.  Has a history of migraines which she gets 1-2 times per week and states that this feels similar.  She denies any myalgias, coughing, sore throat, diarrhea.  Also notes extreme paresthesias.  She states she feels dehydrated.  Feels as if her tongue is sticking to the roof her mouth.  She denies any abdominal pain.    Review of patient's allergies indicates:  No Known Allergies  Past Medical History:   Diagnosis Date    Mental disorder     anxiety/depression     Migraines      History reviewed. No pertinent surgical history.  Family History   Problem Relation Age of Onset    No Known Problems Mother      Social History     Tobacco Use    Smoking status: Never    Smokeless tobacco: Never   Substance Use Topics    Alcohol use: No    Drug use: No     Review of Systems   Constitutional:  Positive for activity change and appetite change. Negative for fever.   HENT:  Negative for sore throat.    Respiratory:  Negative for shortness of breath.     Cardiovascular:  Negative for chest pain.   Gastrointestinal:  Positive for nausea and vomiting. Negative for abdominal pain, constipation and diarrhea.   Genitourinary:  Negative for dysuria, frequency and hematuria.   Musculoskeletal:  Negative for back pain.   Skin:  Negative for rash.   Neurological:  Positive for dizziness, numbness and headaches. Negative for weakness.   Hematological:  Does not bruise/bleed easily.     Physical Exam     Initial Vitals [11/29/22 2040]   BP Pulse Resp Temp SpO2   134/80 82 18 98.9 °F (37.2 °C) 99 %      MAP       --         Physical Exam    Vitals reviewed.  Constitutional: She appears well-developed and well-nourished. She is not diaphoretic. She is cooperative.  Non-toxic appearance. She does not have a sickly appearance. She does not appear ill. No distress (uncomfortable, nauseated).   HENT:   Head: Normocephalic and atraumatic.   Nose: Nose normal.   Mouth/Throat: No trismus in the jaw.   Eyes: Conjunctivae and EOM are normal.   Neck:   Normal range of motion.  Pulmonary/Chest: No accessory muscle usage. No tachypnea. No respiratory distress.   Abdominal: Abdomen is soft. She exhibits no distension. There is no abdominal tenderness. There is no rebound and no guarding.   Musculoskeletal:         General: Normal range of motion.      Cervical back: Normal range of motion.     Neurological: She is alert. She has normal strength.   Provides full history. Answer questions appropriately. Moves all extremities spontaneously.    Skin: Skin is warm and dry. No erythema. No pallor.       ED Course   Procedures  Labs Reviewed   COMPREHENSIVE METABOLIC PANEL - Abnormal; Notable for the following components:       Result Value    Sodium 135 (*)     Potassium 3.4 (*)     CO2 17 (*)     ALT 9 (*)     All other components within normal limits    Narrative:     ADD ON CPK PER DR NATY GREY/ORDER#[id number] @21:50 11/29/2022   SARS-COV2 (COVID) WITH FLU/RSV BY PCR   HIV 1 / 2 ANTIBODY     Narrative:     Release to patient->Immediate   HEPATITIS C ANTIBODY    Narrative:     Release to patient->Immediate   CBC W/ AUTO DIFFERENTIAL    Narrative:     ADD ON CPK PER DR NATY GREY/ORDER#[id number] @21:50 11/29/2022   MAGNESIUM    Narrative:     ADD ON CPK PER DR NATY GREY/ORDER#[id number] @21:50 11/29/2022   LIPASE    Narrative:     ADD ON CPK PER DR NATY GREY/ORDER#[id number] @21:50 11/29/2022   CK   CK    Narrative:     ADD ON CPK PER DR NATY GREY/ORDER#[id number] @21:50 11/29/2022   POCT URINE PREGNANCY   POCT GLUCOSE   POCT GLUCOSE          Imaging Results    None          Medications   lactated ringers bolus 1,000 mL (0 mLs Intravenous Stopped 11/29/22 2353)   metoclopramide HCl injection 10 mg (10 mg Intravenous Given 11/29/22 2214)   potassium bicarbonate disintegrating tablet 20 mEq (20 mEq Oral Given 11/29/22 2351)     Medical Decision Making:   History:   Old Medical Records: I decided to obtain old medical records.  Old Records Summarized: records from clinic visits.  Initial Assessment:   Patient is a 32-year-old female with a history of obesity on semaglutide injection for the past 6 months, migraines who presents to [facility] ED with nausea, vomiting.  Differential Diagnosis:   Includes but is not limited to medication side effect, Hypoglycemia, cyclical vomiting syndrome, hyperemesis cannabis syndrome, electrolyte abnormalities, dehydration, ELEUTERIO, pancreatitis, pregnancy.  Patient's abdomen is soft, nontender nondistended.  She does not have abdominal pain.  I have a low suspicion for acute surgical abdomen.  Clinical Tests:   Lab Tests: Reviewed and Ordered  ED Management:  Patient's glucose was in low 100s with EMS. Her injection was 24 hours ago. Will initiate work up, hydrate, give medication, and continue to monitor.           Attending Attestation:     Physician Attestation Statement for NP/PA:   I discussed this assessment and plan of this patient with the NP/PA, but I did not personally  examine the patient. The face to face encounter was performed by the NP/PA.            ED Course as of 11/30/22 0124   Tue Nov 29, 2022 2057 BP: 134/80 [CL]   2058 Temp: 98.9 °F (37.2 °C) [CL]   2058 Pulse: 82 [CL]   2058 Resp: 18 [CL]   2058 SpO2: 99 % [CL]   2153 I talked to DERIK Tavarez from [facility]. She states that she recommends supportive care. Monitor for hypoglycemia every hour for 8-12 hours AFTER injection. Patient gave injection at 9 PM yesterday. It is over 24 hours now. Also recommend follow up with PCP. There is no antidote or lethal dose for this medication.    [CL]   2155 Blood glucose of 83. Will give sugary juice.  [CL]   2310 WBC: 8.91 [CL]   2310 Hemoglobin: 13.5 [CL]   2310 Platelets: 239 [CL]   2310 Sodium(!): 135 [CL]   2310 Potassium(!): 3.4 [CL]   2310 BUN: 11 [CL]   2310 Creatinine: 0.7 [CL]   2310 BILIRUBIN TOTAL: 0.5 [CL]   2310 AST: 11 [CL]   2310 ALT(!): 9 [CL]   2310 Magnesium: 1.7 [CL]   2310 CPK: 82  Doubt rhabdo.  [CL]   2310 Lipase: 11 [CL]   2354 Patient reassessed.  She reports significant improvement in her symptoms.  She looks improved, sitting up and smiling.  Her symptoms are likely side effect of semaglutide injection. She has not had hypoglycemia while here. She was updated with her labs which only show mild hypokalemia at 3.4.  She was p.o. challenged successfully with orange juice and k replacement. She does not need any futher labs or imaging at this time. Rx zofra. Soft/fluid diet. D/c injection and follow up with provider who Rx'ed medication. All of her questions were answered. Patient comfortable with plan and stable for discharge.  [CL]   Wed Nov 30, 2022 0011 POCT Glucose: 98 [CL]   0011 BP: 123/86 [CL]   0011 Pulse: 82 [CL]   0011 Resp: 18 [CL]   0011 SpO2: 98 % [CL]      ED Course User Index  [CL] Suzanne Cox PA-C               I have reviewed patient's chart and discussed this case with my supervising MD.     Suzanne Cox PA-C  Emergent  Department  [facility] Spectralink #[id number] or #[id number]    Clinical Impression:   Final diagnoses:  [T88.7XXA] Medication side effect (Primary)  [R11.2] Nausea and vomiting, unspecified vomiting type      ED Disposition Condition    Discharge Stable          ED Prescriptions       Medication Sig Dispense Start Date End Date Auth. Provider                      ondansetron (ZOFRAN-ODT) 4 MG TbDL Take 1 tablet (4 mg total) by mouth every 6 (six) hours as needed (nausea). 12 tablet 11/30/2022 -- Suzanne Cox PA-C          Follow-up Information       Follow up With Specialties Details Why Contact Info    SHEILA Bess  Schedule an appointment as soon as possible for a visit   [address] [facility] [address]  [phone]      [facility] - Emergency Dept Emergency Medicine  If symptoms worsen [address] [facility] [address] [phone]             Suzanne Cox PA-C  11/30/22 1401       Yudi Wang MD  11/30/22 1930

## 2022-11-30 NOTE — ED TRIAGE NOTES
Brannon Lei, a 32 y.o. female presents to the ED w/ complaint of inability to keep anything down s/p accidentally taking too much of one of her medicines.    Triage note:  Chief Complaint   Patient presents with    Vomiting     N/V starting today, states accidentally took too much of her Ozempic yeserday. Took 4.8 mg instead of 2.4 mg. Given 4 zofran with EMS.      Review of patient's allergies indicates:  No Known Allergies  Past Medical History:   Diagnosis Date    Mental disorder     anxiety/depression     Migraines       LOC: The patient is awake, alert, aware of environment with an appropriate affect. Oriented x4, speaking appropriately  APPEARANCE: Pt resting comfortably, pt is clean and well groomed, clothing properly fastened  SKIN:The skin is warm and dry, pale, patient has normal skin turgor and moist mucus membranes, no bruising noted   RESPIRATORY:Airway is open and patent, respirations are spontaneous, patient has a normal effort and rate, no accessory muscle use noted.  CARDIAC: Normal rate and rhythm, no peripheral edema noted,   ABDOMEN: Soft, non tender, non distended.  Severe nausea noted per patient with 6 episodes of vomiting  NEUROLOGIC: PERRLA, facial expression is symmetrical, patient moving all extremities spontaneously, normal sensation in all extremities when touched with a finger.  Follows all commands appropriately  MUSCULOSKELETAL: Patient moving all extremities spontaneously, no obvious swelling or deformities noted.

## 2023-03-09 NOTE — PROGRESS NOTES
Up to bedside after chart review. Pt s/p precipitous delivery and noted to have severe range BP after delivery. Pt also complains of sweating and epigastric pain. Pt with hx of anxiety but denies any symptoms currently. Pt previously received labetalol 40mg IV once. CMP and p/c ratio currently being collected. Discussed with patient at bedside my recommendation to start Mag as seizure prophylaxis. Pt voiced understanding. Orders placed. Will continue with close maternal monitoring. Restart home anxiety meds as well. IV labetalol per protocol ordered. Will consider starting oral agent once acute BP under control.    Keyana Martinez MD, FACOG  OB/GYN       Detail Level: Detailed Was A Bandage Applied: Yes Punch Size In Mm: 4 Size Of Lesion In Cm (Optional): 0 Depth Of Punch Biopsy: dermis Biopsy Type: H and E Anesthesia Type: 1% lidocaine with epinephrine Anesthesia Volume In Cc: 0.5 Hemostasis: None Epidermal Sutures: 4-0 Prolene Wound Care: Aquaphor Dressing: pressure dressing Suture Removal: 10 days Patient Will Remove Sutures At Home?: No Lab: [address] Notes (To The Dermatopathologist): See attached pictures and office note Consent: Written consent was obtained and risks were reviewed including but not limited to scarring, infection, bleeding, scabbing, incomplete removal, nerve damage and allergy to anesthesia. Post-Care Instructions: I reviewed with the patient in detail post-care instructions. Patient is to keep the biopsy site dry overnight, and then apply bacitracin twice daily until healed. Patient may apply hydrogen peroxide soaks to remove any crusting. Home Suture Removal Text: Patient was provided a home suture removal kit and will remove their sutures at home.  If they have any questions or difficulties they will call the office. Notification Instructions: Patient will be notified of biopsy results. However, patient instructed to call the office if not contacted within 2 weeks. Billing Type: Third-Party Bill Information: Selecting Yes will display possible errors in your note based on the variables you have selected. This validation is only offered as a suggestion for you. PLEASE NOTE THAT THE VALIDATION TEXT WILL BE REMOVED WHEN YOU FINALIZE YOUR NOTE. IF YOU WANT TO FAX A PRELIMINARY NOTE YOU WILL NEED TO TOGGLE THIS TO 'NO' IF YOU DO NOT WANT IT IN YOUR FAXED NOTE. Punch Size In Mm: 3 Biopsy Type: DIF

## 2023-03-20 ENCOUNTER — TELEPHONE (OUTPATIENT)
Dept: BARIATRICS | Facility: CLINIC | Age: 33
End: 2023-03-20
Payer: MEDICAID

## 2023-03-20 NOTE — TELEPHONE ENCOUNTER
----- Message from Marimar Gasca sent at 3/20/2023  1:57 PM CDT -----  Regarding: appoinment request  Name of Who is Calling:ROSELYN DÍAZ [[id number]          What is the request in detail: pt would like to schedule a appointment for weight loss surgery           Can the clinic reply by [facility]: no           What Number to Call Back if not in [facility]: [phone] (home)

## 2023-03-20 NOTE — TELEPHONE ENCOUNTER
Called patient regarding interest in bariatric surgery. Advised patient, we no longer accepts insurance who has medicaid as primary. Patient verbalized understanding. Suggested patient reach out to [facility]- bariatric department, pt didn't seem interested.

## 2023-09-13 DIAGNOSIS — Z30.016 ENCOUNTER FOR INITIAL PRESCRIPTION OF TRANSDERMAL PATCH HORMONAL CONTRACEPTIVE DEVICE: ICD-10-CM

## 2023-09-13 NOTE — TELEPHONE ENCOUNTER
Refill Routing Note   Medication(s) are not appropriate for processing by [facility] for the following reason(s):      Patient not seen by provider within 15 months    [facility] action(s):  Defer Care Due:  None identified            Appointments  past 12m or future 3m with PCP    Date Provider   Last Visit   7/27/2020 Kaila Cuevas MD   Next Visit   Visit date not found Kaila Cuevas MD   ED visits in past 90 days: 0        Note composed:10:13 AM 09/13/2023

## 2023-09-14 RX ORDER — NORELGESTROMIN AND ETHINYL ESTRADIOL 150; 35 UG/D; UG/D
PATCH TRANSDERMAL
Qty: 3 PATCH | Refills: 11 | Status: SHIPPED | OUTPATIENT
Start: 2023-09-14

## 2024-01-31 ENCOUNTER — PATIENT MESSAGE (OUTPATIENT)
Dept: OBSTETRICS AND GYNECOLOGY | Facility: CLINIC | Age: 34
End: 2024-01-31
Payer: MEDICAID

## 2025-06-14 ENCOUNTER — HOSPITAL ENCOUNTER (EMERGENCY)
Facility: HOSPITAL | Age: 35
Discharge: HOME | End: 2025-06-14
Attending: EMERGENCY MEDICINE
Payer: MEDICAID

## 2025-06-14 VITALS
HEART RATE: 97 BPM | WEIGHT: 240 LBS | HEIGHT: 61 IN | BODY MASS INDEX: 45.31 KG/M2 | SYSTOLIC BLOOD PRESSURE: 142 MMHG | RESPIRATION RATE: 20 BRPM | TEMPERATURE: 99 F | OXYGEN SATURATION: 98 % | DIASTOLIC BLOOD PRESSURE: 78 MMHG

## 2025-06-14 DIAGNOSIS — B34.9 VIRAL ILLNESS: Primary | ICD-10-CM

## 2025-06-14 LAB
ALBUMIN SERPL-MCNC: 4.1 G/DL (ref 3.3–5.5)
ALP SERPL-CCNC: 52 U/L (ref 42–141)
BILIRUB SERPL-MCNC: 0.7 MG/DL (ref 0.2–1.6)
BUN SERPL-MCNC: 9 MG/DL (ref 7–22)
CALCIUM SERPL-MCNC: 10.4 MG/DL (ref 8–10.3)
CHLORIDE SERPL-SCNC: 103 MMOL/L (ref 98–108)
CREAT SERPL-MCNC: 0.8 MG/DL (ref 0.6–1.2)
GLUCOSE SERPL-MCNC: 106 MG/DL (ref 73–118)
HCT, POC: NORMAL
HGB, POC: NORMAL (ref 14–18)
INFLUENZA A ANTIGEN, POC: NEGATIVE
INFLUENZA B ANTIGEN, POC: NEGATIVE
MCH, POC: NORMAL
MCHC, POC: NORMAL
MCV, POC: NORMAL
MPV, POC: NORMAL
POC ALT (SGPT): 61 U/L (ref 10–47)
POC AST (SGOT): 44 U/L (ref 11–38)
POC PLATELET COUNT: NORMAL
POC TCO2: 25 MMOL/L (ref 18–33)
POTASSIUM BLD-SCNC: 3.8 MMOL/L (ref 3.6–5.1)
PROTEIN, POC: 9.1 G/DL (ref 6.4–8.1)
RBC, POC: NORMAL
RDW, POC: NORMAL
SODIUM BLD-SCNC: 135 MMOL/L (ref 128–145)
WBC, POC: NORMAL

## 2025-06-14 PROCEDURE — 87804 INFLUENZA ASSAY W/OPTIC: CPT | Mod: ER

## 2025-06-14 PROCEDURE — 96361 HYDRATE IV INFUSION ADD-ON: CPT | Mod: ER

## 2025-06-14 PROCEDURE — 99284 EMERGENCY DEPT VISIT MOD MDM: CPT | Mod: 25,ER

## 2025-06-14 PROCEDURE — 80053 COMPREHEN METABOLIC PANEL: CPT | Mod: ER

## 2025-06-14 PROCEDURE — 96374 THER/PROPH/DIAG INJ IV PUSH: CPT | Mod: ER

## 2025-06-14 PROCEDURE — 63600175 PHARM REV CODE 636 W HCPCS: Mod: ER | Performed by: EMERGENCY MEDICINE

## 2025-06-14 PROCEDURE — 85025 COMPLETE CBC W/AUTO DIFF WBC: CPT | Mod: ER

## 2025-06-14 PROCEDURE — 25000003 PHARM REV CODE 250: Mod: ER | Performed by: EMERGENCY MEDICINE

## 2025-06-14 RX ORDER — METOCLOPRAMIDE 10 MG/1
10 TABLET ORAL EVERY 6 HOURS PRN
Qty: 30 TABLET | Refills: 0 | Status: SHIPPED | OUTPATIENT
Start: 2025-06-14

## 2025-06-14 RX ORDER — ONDANSETRON 4 MG/1
4 TABLET, ORALLY DISINTEGRATING ORAL EVERY 6 HOURS PRN
Qty: 10 TABLET | Refills: 0 | Status: SHIPPED | OUTPATIENT
Start: 2025-06-14

## 2025-06-14 RX ORDER — ONDANSETRON HYDROCHLORIDE 2 MG/ML
4 INJECTION, SOLUTION INTRAVENOUS
Status: COMPLETED | OUTPATIENT
Start: 2025-06-14 | End: 2025-06-14

## 2025-06-14 RX ADMIN — ONDANSETRON 4 MG: 2 INJECTION INTRAMUSCULAR; INTRAVENOUS at 09:06

## 2025-06-14 RX ADMIN — SODIUM CHLORIDE 1000 ML: 9 INJECTION, SOLUTION INTRAVENOUS at 09:06

## 2025-06-14 NOTE — ED PROVIDER NOTES
Encounter Date: 6/14/2025    SCRIBE #1 NOTE: I, Karen Vora, am scribing for, and in the presence of,  Doe Guillory MD. I have scribed the following portions of the note - Other sections scribed: HPI, ROS, PE.       History     Chief Complaint   Patient presents with    global weakness     Generalized weakness, tachycardia, general malaise.     Tachycardia    Dehydration     34-year-old female, with a PMHx of migraines, presents to the ED for evaluation of generalized weakness that began yesterday. Patient reports she drank a lot of coffee yesterday and later in the night, she began having palpitations. She reports her systolic blood pressure has been fluctuating between 140 and 90 since yesterday. She reports she woke up at 6 am this morning, vomited, and had generalized weakness, dizziness, hyperventilation, which has subsided, tingling in hands extending up to mid-forearms, and really dry mouth. She reports she feels dehydrated. She reports she ate 2 Ritz crackers and drank juice today. She denies known sick contacts. She reports her physical at the beginning of last month was normal. No other exacerbating or alleviating factors. Patient has no other complaints at the present time.     The history is provided by the patient. No  was used.     Review of patient's allergies indicates:  No Known Allergies  Past Medical History:   Diagnosis Date    Mental disorder     anxiety/depression     Migraines      History reviewed. No pertinent surgical history.  Family History   Problem Relation Name Age of Onset    No Known Problems Mother       Social History[1]  Review of Systems   Constitutional: Negative.    HENT:          (+) dry mouth   Eyes: Negative.    Respiratory: Negative.     Cardiovascular:  Positive for palpitations.   Gastrointestinal:  Positive for vomiting.   Endocrine: Negative.    Genitourinary: Negative.    Musculoskeletal: Negative.    Skin: Negative.    Allergic/Immunologic:  Negative.    Neurological:  Positive for dizziness and weakness.        (+) tingling in hands up to mid-forearms   Hematological: Negative.    Psychiatric/Behavioral: Negative.         Physical Exam     Initial Vitals [06/14/25 0916]   BP Pulse Resp Temp SpO2   (!) 142/78 (!) 125 20 98.8 °F (37.1 °C) 100 %      MAP       --         Physical Exam    Constitutional: Vital signs are normal. She appears well-developed. She is active and cooperative.   HENT:   Head: Normocephalic and atraumatic.   Eyes: Conjunctivae, EOM and lids are normal. Pupils are equal, round, and reactive to light.   Neck: Trachea normal. Neck supple. No thyroid mass present.    Full passive range of motion without pain.     Cardiovascular:  Regular rhythm, S1 normal, S2 normal, normal heart sounds, intact distal pulses and normal pulses.   Tachycardia present.         Pulmonary/Chest: Breath sounds normal.   Abdominal: Abdomen is soft. Bowel sounds are normal.   Musculoskeletal:         General: Normal range of motion.      Cervical back: Full passive range of motion without pain and neck supple.     Lymphadenopathy:     She has no axillary adenopathy.   Neurological: She is alert and oriented to person, place, and time.   Skin: Skin is warm, dry and intact.   Psychiatric: She has a normal mood and affect. Her speech is normal and behavior is normal. Judgment and thought content normal. Cognition and memory are normal.         ED Course   Procedures  Labs Reviewed   POCT CMP - Abnormal       Result Value    Albumin, POC 4.1      Alkaline Phosphatase, POC 52      ALT (SGPT), POC 61 (*)     AST (SGOT), POC 44 (*)     POC BUN 9      Calcium, POC 10.4 (*)     POC Chloride 103      POC Creatinine 0.8      POC Glucose 106      POC Potassium 3.8      POC Sodium 135      Bilirubin, POC 0.7      POC TCO2 25      Protein, POC 9.1 (*)    POCT CBC    Hematocrit        Hemoglobin        RBC        WBC        MCV        MCH, POC        MCHC        RDW-CV         Platelet Count, POC        MPV       POCT INFLUENZA A/B MOLECULAR   POCT RAPID INFLUENZA A/B    Influenza B Ag negative      Inflenza A Ag negative     POCT CMP          Imaging Results    None          Medications   sodium chloride 0.9% bolus 1,000 mL 1,000 mL (1,000 mLs Intravenous New Bag 6/14/25 0950)   ondansetron injection 4 mg (4 mg Intravenous Given 6/14/25 0950)     Medical Decision Making  Patient presented with malaise and fatigue.  Laboratory data other than some spurious elevation of the transaminases and is completely unremarkable.  Course I considered dextrose etiology Gastroenterology genitourinary potential etiologies for the patient's presenting symptomatology.  I am going to go ahead and treat the patient for symptomatic issues and have the patient follow up with her primary health care provider.    Amount and/or Complexity of Data Reviewed  Labs: ordered. Decision-making details documented in ED Course.    Risk  Prescription drug management.            Scribe Attestation:   Scribe #1: I performed the above scribed service and the documentation accurately describes the services I performed. I attest to the accuracy of the note.        ED Course as of 06/14/25 1024   Sat Jun 14, 2025   1021 Slightly elevated transaminases [MI]   1022 CBC is completely normal.  And influenza is negative as well. [MI]      ED Course User Index  [MI] Doe Guillory MD                         This document was produced by a scribe under my direction and in my presence. I agree with the content of the note and have made any necessary edits.     Doe Guillory MD    06/14/2025 10:24 AM    Clinical Impression:  Final diagnoses:  [B34.9] Viral illness (Primary)          ED Disposition Condition    Discharge Stable          ED Prescriptions       Medication Sig Dispense Start Date End Date Auth. Provider    ondansetron (ZOFRAN-ODT) 4 MG TbDL Take 1 tablet (4 mg total) by mouth every 6 (six) hours as needed. 10  tablet 6/14/2025 -- Doe Guillory MD    metoclopramide HCl (REGLAN) 10 MG tablet Take 1 tablet (10 mg total) by mouth every 6 (six) hours as needed. 30 tablet 6/14/2025 -- Doe Guillory MD          Follow-up Information       Follow up With Specialties Details Why Contact Info    Your PCP  Schedule an appointment as soon as possible for a visit in 1 week                     [1]   Social History  Tobacco Use    Smoking status: Never    Smokeless tobacco: Never   Substance Use Topics    Alcohol use: No    Drug use: No        Doe Guillory MD  06/14/25 1024

## 2025-06-15 ENCOUNTER — HOSPITAL ENCOUNTER (EMERGENCY)
Facility: HOSPITAL | Age: 35
Discharge: HOME | End: 2025-06-15
Attending: STUDENT IN AN ORGANIZED HEALTH CARE EDUCATION/TRAINING PROGRAM
Payer: MEDICAID

## 2025-06-15 VITALS
OXYGEN SATURATION: 99 % | HEIGHT: 61 IN | WEIGHT: 240 LBS | TEMPERATURE: 98 F | HEART RATE: 90 BPM | DIASTOLIC BLOOD PRESSURE: 63 MMHG | RESPIRATION RATE: 16 BRPM | BODY MASS INDEX: 45.31 KG/M2 | SYSTOLIC BLOOD PRESSURE: 119 MMHG

## 2025-06-15 DIAGNOSIS — R11.2 NAUSEA AND VOMITING: ICD-10-CM

## 2025-06-15 DIAGNOSIS — R00.0 TACHYCARDIA: ICD-10-CM

## 2025-06-15 DIAGNOSIS — F41.9 ANXIETY: Primary | ICD-10-CM

## 2025-06-15 LAB
ABSOLUTE EOSINOPHIL (OHS): 0.07 K/UL
ABSOLUTE MONOCYTE (OHS): 0.63 K/UL (ref 0.3–1)
ABSOLUTE NEUTROPHIL COUNT (OHS): 4.99 K/UL (ref 1.8–7.7)
ALBUMIN SERPL BCP-MCNC: 3.9 G/DL (ref 3.5–5.2)
ALP SERPL-CCNC: 60 UNIT/L (ref 40–150)
ALT SERPL W/O P-5'-P-CCNC: 57 UNIT/L (ref 10–44)
ANION GAP (OHS): 14 MMOL/L (ref 8–16)
AST SERPL-CCNC: 42 UNIT/L (ref 11–45)
B-HCG UR QL: NEGATIVE
BASOPHILS # BLD AUTO: 0.05 K/UL
BASOPHILS NFR BLD AUTO: 0.6 %
BILIRUB SERPL-MCNC: 0.7 MG/DL (ref 0.1–1)
BILIRUB UR QL STRIP.AUTO: NEGATIVE
BUN SERPL-MCNC: 10 MG/DL (ref 6–20)
CALCIUM SERPL-MCNC: 9.3 MG/DL (ref 8.7–10.5)
CHLORIDE SERPL-SCNC: 103 MMOL/L (ref 95–110)
CLARITY UR: CLEAR
CO2 SERPL-SCNC: 18 MMOL/L (ref 23–29)
COLOR UR AUTO: YELLOW
CREAT SERPL-MCNC: 0.8 MG/DL (ref 0.5–1.4)
CTP QC/QA: YES
ERYTHROCYTE [DISTWIDTH] IN BLOOD BY AUTOMATED COUNT: 13 % (ref 11.5–14.5)
GFR SERPLBLD CREATININE-BSD FMLA CKD-EPI: >60 ML/MIN/1.73/M2
GLUCOSE SERPL-MCNC: 97 MG/DL (ref 70–110)
GLUCOSE UR QL STRIP: NEGATIVE
HCG INTACT+B SERPL-ACNC: <2.4 MIU/ML
HCT VFR BLD AUTO: 41.4 % (ref 37–48.5)
HCV AB SERPL QL IA: NORMAL
HGB BLD-MCNC: 13.6 GM/DL (ref 12–16)
HGB UR QL STRIP: NEGATIVE
HIV 1+2 AB+HIV1 P24 AG SERPL QL IA: NORMAL
HOLD SPECIMEN: NORMAL
HOLD SPECIMEN: NORMAL
IMM GRANULOCYTES # BLD AUTO: 0.04 K/UL (ref 0–0.04)
IMM GRANULOCYTES NFR BLD AUTO: 0.5 % (ref 0–0.5)
KETONES UR QL STRIP: ABNORMAL
LEUKOCYTE ESTERASE UR QL STRIP: NEGATIVE
LIPASE SERPL-CCNC: 22 U/L (ref 4–60)
LYMPHOCYTES # BLD AUTO: 2.57 K/UL (ref 1–4.8)
MCH RBC QN AUTO: 28.6 PG (ref 27–31)
MCHC RBC AUTO-ENTMCNC: 32.9 G/DL (ref 32–36)
MCV RBC AUTO: 87 FL (ref 82–98)
NITRITE UR QL STRIP: NEGATIVE
NUCLEATED RBC (/100WBC) (OHS): 0 /100 WBC
OHS QRS DURATION: 74 MS
OHS QRS DURATION: 86 MS
OHS QTC CALCULATION: 428 MS
OHS QTC CALCULATION: 457 MS
PH UR STRIP: 6 [PH]
PLATELET # BLD AUTO: 275 K/UL (ref 150–450)
PMV BLD AUTO: 11.2 FL (ref 9.2–12.9)
POTASSIUM SERPL-SCNC: 4.1 MMOL/L (ref 3.5–5.1)
PROT SERPL-MCNC: 8.5 GM/DL (ref 6–8.4)
PROT UR QL STRIP: NEGATIVE
RBC # BLD AUTO: 4.75 M/UL (ref 4–5.4)
RELATIVE EOSINOPHIL (OHS): 0.8 %
RELATIVE LYMPHOCYTE (OHS): 30.8 % (ref 18–48)
RELATIVE MONOCYTE (OHS): 7.5 % (ref 4–15)
RELATIVE NEUTROPHIL (OHS): 59.8 % (ref 38–73)
SODIUM SERPL-SCNC: 135 MMOL/L (ref 136–145)
SP GR UR STRIP: 1.01
UROBILINOGEN UR STRIP-ACNC: NEGATIVE EU/DL
WBC # BLD AUTO: 8.35 K/UL (ref 3.9–12.7)

## 2025-06-15 PROCEDURE — 83690 ASSAY OF LIPASE: CPT

## 2025-06-15 PROCEDURE — 93005 ELECTROCARDIOGRAM TRACING: CPT

## 2025-06-15 PROCEDURE — 81025 URINE PREGNANCY TEST: CPT | Performed by: STUDENT IN AN ORGANIZED HEALTH CARE EDUCATION/TRAINING PROGRAM

## 2025-06-15 PROCEDURE — 80053 COMPREHEN METABOLIC PANEL: CPT

## 2025-06-15 PROCEDURE — 96374 THER/PROPH/DIAG INJ IV PUSH: CPT

## 2025-06-15 PROCEDURE — 81003 URINALYSIS AUTO W/O SCOPE: CPT

## 2025-06-15 PROCEDURE — 96361 HYDRATE IV INFUSION ADD-ON: CPT

## 2025-06-15 PROCEDURE — 86803 HEPATITIS C AB TEST: CPT | Performed by: PHYSICIAN ASSISTANT

## 2025-06-15 PROCEDURE — 93010 ELECTROCARDIOGRAM REPORT: CPT | Mod: ,,, | Performed by: INTERNAL MEDICINE

## 2025-06-15 PROCEDURE — 99284 EMERGENCY DEPT VISIT MOD MDM: CPT | Mod: 25

## 2025-06-15 PROCEDURE — 87389 HIV-1 AG W/HIV-1&-2 AB AG IA: CPT | Performed by: PHYSICIAN ASSISTANT

## 2025-06-15 PROCEDURE — 25000003 PHARM REV CODE 250

## 2025-06-15 PROCEDURE — 85025 COMPLETE CBC W/AUTO DIFF WBC: CPT

## 2025-06-15 PROCEDURE — 96375 TX/PRO/DX INJ NEW DRUG ADDON: CPT

## 2025-06-15 PROCEDURE — 84702 CHORIONIC GONADOTROPIN TEST: CPT

## 2025-06-15 PROCEDURE — 63600175 PHARM REV CODE 636 W HCPCS

## 2025-06-15 RX ORDER — LORAZEPAM 2 MG/ML
1 INJECTION INTRAMUSCULAR
Status: COMPLETED | OUTPATIENT
Start: 2025-06-15 | End: 2025-06-15

## 2025-06-15 RX ORDER — ONDANSETRON HYDROCHLORIDE 2 MG/ML
4 INJECTION, SOLUTION INTRAVENOUS
Status: COMPLETED | OUTPATIENT
Start: 2025-06-15 | End: 2025-06-15

## 2025-06-15 RX ORDER — HYDROXYZINE HYDROCHLORIDE 25 MG/1
25 TABLET, FILM COATED ORAL EVERY 6 HOURS
Qty: 12 TABLET | Refills: 0 | Status: SHIPPED | OUTPATIENT
Start: 2025-06-15

## 2025-06-15 RX ADMIN — LORAZEPAM 1 MG: 2 INJECTION INTRAMUSCULAR; INTRAVENOUS at 08:06

## 2025-06-15 RX ADMIN — SODIUM CHLORIDE 1000 ML: 9 INJECTION, SOLUTION INTRAVENOUS at 06:06

## 2025-06-15 RX ADMIN — ONDANSETRON 4 MG: 2 INJECTION INTRAMUSCULAR; INTRAVENOUS at 06:06

## 2025-06-15 NOTE — ED TRIAGE NOTES
Brannon Lei, a 34 y.o. female presents to the ED w/ complaint of     Triage note:  Chief Complaint   Patient presents with    Vomiting     Seen at [place] for same yest   States feeling anxious and not taking rx meds  Review of patient's allergies indicates:  No Known Allergies  Past Medical History:   Diagnosis Date    Mental disorder     anxiety/depression     Migraines

## 2025-06-15 NOTE — ED NOTES
Assumed care for pt after recieving report from nightshift RN. Pt resting in bed in NAD, RR e/u. Vital signs stable and WDL at this time of assessment. Pt offered bathroom assistance and denies need at this time. Pt aware a urine sample will need to be obtained. Patient ambulates independently with steady gait. Explanation of care/wait provided. Pt given warm blanket but verbalizes no other needs at this time. Bed in low, locked position with rails up and call bell in reach. Pt's white board updated with today's care team and plan.

## 2025-06-15 NOTE — DISCHARGE INSTRUCTIONS
Thank you for coming to [facility].  It was a pleasure taking care of you.  As discussed, a referral has been placed to psychiatry on your behalf so that you can establish care for management of your anxiety.  You should also follow-up with your primary care physician regarding nausea and vomiting, which are likely a side effect of Victoza.     A prescription for a medication to help with anxiety symptoms has been sent to your pharmacy. Please do not combine this medication with any other sedating substances such as alcohol or other medications. Do not drive while taking this medication.

## 2025-06-15 NOTE — ED PROVIDER NOTES
Encounter Date: 6/15/2025       History     Chief Complaint   Patient presents with    Vomiting     Seen at [place] for same yest     34 y.o. female with a PMH of migraines, anxiety, depression, and obesity on victoza [place] [facility] [place] evaluation of nausea and vomiting. Patient vomited once yesterday, was seen in [place] ED with resolution of symptoms and unremarkable workup and was discharged home. She was feeling well yesterday following DC, but woke up this morning at 6 AM and vomited twice, has been nauseous since. She reports starting victoza injections on Monday, has been doing them daily since except yesterday and today. She additionally reports significant anxiety particularly due to a new job she started, and having social anxiety while introducing herself to a large group of new people. She reports having trouble with her anxiety for some time, and recently took 1 of her old citalopram pills, which she was previously taking daily, to see if it would help but it didn't. She denies SI, HI, AH, or VH. She denies any other symptoms including fever, chills, cough, chest pain, dyspnea, abdominal pain, diarrhea, dysuria, flank pain, edema, headache, vision changes, weakness, or sensory changes.       The history is provided by the patient, medical records and a relative (patient's brother at bedside).     Review of patient's allergies indicates:  No Known Allergies  Past Medical History:   Diagnosis Date    Mental disorder     anxiety/depression     Migraines      History reviewed. No pertinent surgical history.  Family History   Problem Relation Name Age of Onset    No Known Problems Mother       Social History[1]  Review of Systems    Physical Exam     Initial Vitals [06/15/25 0558]   BP Pulse Resp Temp SpO2   136/75 108 20 98.2 °F (36.8 °C) 98 %      MAP       --         Physical Exam    Nursing note and vitals reviewed.  Constitutional: She appears well-developed and well-nourished. No  distress.   HENT:   Head: Normocephalic. Mouth/Throat: Oropharynx is clear and moist.   Eyes: Conjunctivae are normal. Pupils are equal, round, and reactive to light. No scleral icterus.   Neck: Neck supple.   Cardiovascular:  Normal rate and regular rhythm.           Pulmonary/Chest: Breath sounds normal. No stridor. No respiratory distress.   Abdominal: Abdomen is soft. She exhibits no distension. There is no abdominal tenderness.   Musculoskeletal:         General: No edema.      Cervical back: Neck supple.     Neurological: She is alert. GCS score is 15. GCS eye subscore is 4. GCS verbal subscore is 5. GCS motor subscore is 6.   Skin: Skin is warm and dry. No rash noted.   Psychiatric: Her mood appears anxious.   Anxious and tearful at times when discussing her anxiety around recent job, linear thought process, pleasant and conversant, has good insight          ED Course   Procedures  Labs Reviewed   COMPREHENSIVE METABOLIC PANEL - Abnormal       Result Value    Sodium 135 (*)     Potassium 4.1      Chloride 103      CO2 18 (*)     Glucose 97      BUN 10      Creatinine 0.8      Calcium 9.3      Protein Total 8.5 (*)     Albumin 3.9      Bilirubin Total 0.7      ALP 60      AST 42      ALT 57 (*)     Anion Gap 14      eGFR >60     URINALYSIS, REFLEX TO URINE CULTURE - Abnormal    Color, UA Yellow      Appearance, UA Clear      pH, UA 6.0      Spec Grav UA 1.010      Protein, UA Negative      Glucose, UA Negative      Ketones, UA Trace (*)     Bilirubin, UA Negative      Blood, UA Negative      Nitrites, UA Negative      Urobilinogen, UA Negative      Leukocyte Esterase, UA Negative     HEPATITIS C ANTIBODY - Normal    Hep C Ab Interp Non-Reactive     HIV 1 / 2 ANTIBODY - Normal    HIV 1/2 Ag/Ab Non-Reactive     LIPASE - Normal    Lipase Level 22     CBC WITH DIFFERENTIAL - Normal    WBC 8.35      RBC 4.75      HGB 13.6      HCT 41.4      MCV 87      MCH 28.6      MCHC 32.9      RDW 13.0      Platelet Count 275       MPV 11.2      Nucleated RBC 0      Neut % 59.8      Lymph % 30.8      Mono % 7.5      Eos % 0.8      Basophil % 0.6      Imm Grans % 0.5      Neut # 4.99      Lymph # 2.57      Mono # 0.63      Eos # 0.07      Baso # 0.05      Imm Grans # 0.04     HEP C VIRUS HOLD SPECIMEN    Extra Tube Hold for add-ons.     CBC W/ AUTO DIFFERENTIAL    Narrative:     The following orders were created for panel order CBC auto differential.  Procedure                               Abnormality         Status                     ---------                               -----------         ------                     CBC with Differential[[id number]]       Normal              Final result                 Please view results for these tests on the individual orders.   HCG, QUANTITATIVE    Beta HCG Quant <2.40     GREY TOP URINE HOLD    Extra Tube Hold for add-ons.     POCT URINE PREGNANCY    POC Preg Test, Ur Negative       Acceptable Yes          ECG Results              EKG 12-lead (Final result)        Collection Time Result Time QRS Duration [facility] QTC Calculation    06/15/25 07:56:35 06/15/25 08:48:56 74 428                     Final result by Interface, Lab In [facility] (06/15/25 08:49:01)                   Narrative:    Test Reason : R00.0,    Vent. Rate : 105 BPM     Atrial Rate : 105 BPM     P-R Int : 142 ms          QRS Dur :  74 ms      QT Int : 324 ms       P-R-T Axes :  64  79  20 degrees    QTcB Int : 428 ms    Sinus tachycardia  Nonspecific T wave abnormality  Abnormal ECG  When compared with ECG of 15-Horacio-2025 06:18,  No significant change was found  Confirmed by Helga Marsh ([id number]) on 6/15/2025 8:48:52 AM    Referred By: AAAREFERRAL SELF           Confirmed By: Helga Marsh                                     EKG 12-lead (Final result)        Collection Time Result Time QRS Duration [facility] QTC Calculation    06/15/25 06:18:01 06/15/25 09:56:07 86 457                     Final result by Interface, Lab In [facility]  (06/15/25 09:56:15)                   Narrative:    Test Reason : R11.2,    Vent. Rate : 104 BPM     Atrial Rate : 104 BPM     P-R Int : 140 ms          QRS Dur :  86 ms      QT Int : 348 ms       P-R-T Axes :  69  71  18 degrees    QTcB Int : 457 ms    Sinus tachycardia  Otherwise normal ECG  No previous ECGs available  Confirmed by Allan Corral ([id number]) on 6/15/2025 9:56:04 AM    Referred By: AAAREFERRAL SELF           Confirmed By: Allan Corral                                  Imaging Results    None          Medications   sodium chloride 0.9% bolus 1,000 mL 1,000 mL (0 mLs Intravenous Stopped 6/15/25 0750)   ondansetron injection 4 mg (4 mg Intravenous Given 6/15/25 0633)   LORazepam injection 1 mg (1 mg Intravenous Given 6/15/25 0803)     Medical Decision Making  Pleasant 35 y/o F presents for evaluation of nausea and vomiting. She additionally reports significant anxiety.     Patient is afebrile, hemodynamically stable, and in no acute distress on arrival.   Differential diagnoses considered include, but not limited to: medication side effect, electrolyte abnormality, pregnancy, ELEUTERIO, dehydration, gastroparesis, pancreatitis, hyperthyroidism     Labs unremarkable. Patient given IVF, zofran, and ativan.   On reevaluation, she reports she is feeling significantly better. She is tolerating PO without difficulty.   Discussed with the patient that some anxiety with new jobs or in new social situations can be normal, but as it is causing her a lot of distress and impacting her life negatively, she should see psychiatry for therapy and/or medication management, and citalopram is meant to be taken daily as it can take many weeks to accumulate and have a therapeutic effect on anxiety. Patient expressed understanding of this. I have provided a prescription for hydroxyzine with precautions to not combine it with any other substance, and not drive on it, and have provided a referral and phone number for psychiatry clinic. I  additionally discussed avoiding any further victoza shots as I suspect this is the cause of her nausea and vomiting, and discussed she should discuss this with her PCP to make a plan prior to restarting this medication or any other similar medications. Discussed plan for discharge home, follow up with psychiatry and PCP, and strict return precautions and patient expressed understanding and agreement.     Amount and/or Complexity of Data Reviewed  Labs: ordered. Decision-making details documented in ED Course.    Risk  Prescription drug management.               ED Course as of 06/16/25 0043   Sun Horacio 15, 2025   0709 Beta HCG Quant: <2.40 [OW]   0709 Lipase: 22 [OW]   0709 WBC: 8.35 [OW]   0709 Hemoglobin: 13.6 [OW]   0709 Platelet Count: 275 [OW]      ED Course User Index  [OW] Sol Del Castillo MD                           Clinical Impression:  Final diagnoses:  [R11.2] Nausea and vomiting  [R00.0] Tachycardia  [F41.9] Anxiety (Primary)          ED Disposition Condition    Discharge Stable          ED Prescriptions       Medication Sig Dispense Start Date End Date Auth. Provider    hydrOXYzine HCL (ATARAX) 25 MG tablet Take 1 tablet (25 mg total) by mouth every 6 (six) hours. 12 tablet 6/15/2025 -- Sol Del Castillo MD          Follow-up Information       Follow up With Specialties Details Why Contact Info    [facility] Psychiatry   [address] [id number]  [phone]    [facility] - Emergency Dept Emergency Medicine  If symptoms worsen [address] [id number] [phone]                   [1]   Social History  Tobacco Use    Smoking status: Never    Smokeless tobacco: Never   Substance Use Topics    Alcohol use: No    Drug use: No        Sol Del Castillo MD  Resident  06/16/25 0043

## 2025-06-17 ENCOUNTER — HOSPITAL ENCOUNTER (EMERGENCY)
Facility: HOSPITAL | Age: 35
Discharge: HOME | End: 2025-06-17
Attending: EMERGENCY MEDICINE
Payer: MEDICAID

## 2025-06-17 VITALS
WEIGHT: 240 LBS | DIASTOLIC BLOOD PRESSURE: 78 MMHG | OXYGEN SATURATION: 94 % | HEART RATE: 77 BPM | TEMPERATURE: 99 F | SYSTOLIC BLOOD PRESSURE: 115 MMHG | BODY MASS INDEX: 45.31 KG/M2 | HEIGHT: 61 IN | RESPIRATION RATE: 16 BRPM

## 2025-06-17 DIAGNOSIS — Z86.79 HISTORY OF HYPERTENSION: ICD-10-CM

## 2025-06-17 DIAGNOSIS — R00.2 PALPITATIONS: ICD-10-CM

## 2025-06-17 DIAGNOSIS — F41.9 ANXIETY: Primary | ICD-10-CM

## 2025-06-17 LAB
B-HCG UR QL: NEGATIVE
BUN SERPL-MCNC: 5 MG/DL (ref 6–30)
CHLORIDE SERPL-SCNC: 106 MMOL/L (ref 95–110)
CREAT SERPL-MCNC: 0.7 MG/DL (ref 0.5–1.4)
CTP QC/QA: YES
GLUCOSE SERPL-MCNC: 105 MG/DL (ref 70–110)
HCT VFR BLD CALC: 37 %PCV (ref 36–54)
OHS QRS DURATION: 80 MS
OHS QTC CALCULATION: 429 MS
POC IONIZED CALCIUM: 1.06 MMOL/L (ref 1.06–1.42)
POC TCO2 (MEASURED): 21 MMOL/L (ref 23–29)
POTASSIUM BLD-SCNC: 3.8 MMOL/L (ref 3.5–5.1)
SAMPLE: ABNORMAL
SODIUM BLD-SCNC: 138 MMOL/L (ref 136–145)

## 2025-06-17 PROCEDURE — 63600175 PHARM REV CODE 636 W HCPCS: Performed by: EMERGENCY MEDICINE

## 2025-06-17 PROCEDURE — 81025 URINE PREGNANCY TEST: CPT | Performed by: EMERGENCY MEDICINE

## 2025-06-17 PROCEDURE — 93010 ELECTROCARDIOGRAM REPORT: CPT | Mod: ,,, | Performed by: INTERNAL MEDICINE

## 2025-06-17 PROCEDURE — 93005 ELECTROCARDIOGRAM TRACING: CPT

## 2025-06-17 PROCEDURE — 99284 EMERGENCY DEPT VISIT MOD MDM: CPT | Mod: 25

## 2025-06-17 PROCEDURE — 96374 THER/PROPH/DIAG INJ IV PUSH: CPT

## 2025-06-17 PROCEDURE — 80047 BASIC METABLC PNL IONIZED CA: CPT

## 2025-06-17 RX ORDER — LORAZEPAM 2 MG/ML
0.5 INJECTION INTRAMUSCULAR
Status: COMPLETED | OUTPATIENT
Start: 2025-06-17 | End: 2025-06-17

## 2025-06-17 RX ADMIN — LORAZEPAM 0.5 MG: 2 INJECTION INTRAMUSCULAR; INTRAVENOUS at 03:06

## 2025-06-17 NOTE — ED PROVIDER NOTES
"History:  Brannon Lei is a 34 y.o. female who presents to the ED with Hypertension (Called EMS for hypertension, upon EMS arrival, pt's BP within normal range)    Described as 34-year-old female presenting to the emergency department with anxiety.  She reports that she took her hydroxyzine around 10:00 p.m. but still felt anxious.  She checked her blood pressure around midnight and noted it to be elevated at 189 systolic.  She is anxious that she would have a heart attack and took an extra dose of her recently prescribed amlodipine.  She felt her heart racing at that time, similar to her recent symptoms for which she has had extensive workups in the emergency department.  She saw her primary doctor earlier today and was prescribed a medication for anxiety that starts with the letter L", but has not yet started this medication as the pharmacy did not have it ready for pickup today.  She is concerned that her symptoms are related to her recent Victoza injections.     Review of Systems: All systems reviewed and are negative except as per history of present illness.    Medications:   Previous Medications    (MAGIC MOUTHWASH) 1:1:1 BENADRYL 12.5MG/5ML LIQ, ALUMINUM & MAGNESIUM HYDROXIDE-SIMEHTICONE (MAALOX), LIDOCAINE VISCOUS 2%    Swish and spit 5 mLs every 4 (four) hours as needed (sore throat).    ACETAMINOPHEN (TYLENOL) 500 MG TABLET    Take 1 tablet (500 mg total) by mouth every 6 (six) hours as needed for Pain.    BUTALBITAL-ACETAMINOPHEN-CAFFEINE -40 MG (FIORICET, ESGIC) -40 MG PER TABLET    Take 1 tablet by mouth every 6 (six) hours as needed for Pain (headache).    HYDROXYZINE HCL (ATARAX) 25 MG TABLET    Take 1 tablet (25 mg total) by mouth every 6 (six) hours.    IBUPROFEN (ADVIL,MOTRIN) 800 MG TABLET    TAKE 1 TABLET(800 MG) BY MOUTH EVERY 8 HOURS AS NEEDED FOR PAIN    METOCLOPRAMIDE HCL (REGLAN) 10 MG TABLET    Take 1 tablet (10 mg total) by mouth every 6 (six) hours as needed.    NIFEDIPINE " (PROCARDIA-XL) 30 MG (OSM) 24 HR TABLET    Take 1 tablet (30 mg total) by mouth once daily.    ONDANSETRON (ZOFRAN-ODT) 4 MG TBDL    Take 1 tablet (4 mg total) by mouth every 6 (six) hours as needed (nausea).    ONDANSETRON (ZOFRAN-ODT) 4 MG TBDL    Take 1 tablet (4 mg total) by mouth every 6 (six) hours as needed.    OXYCODONE-ACETAMINOPHEN (PERCOCET) 5-325 MG PER TABLET    Take 1 tablet by mouth every 4 (four) hours as needed for Pain.    PANTOPRAZOLE (PROTONIX) 40 MG TABLET    Take 1 tablet (40 mg total) by mouth once daily.    PROMETHAZINE (PHENERGAN) 25 MG TABLET    Take 1 tablet (25 mg total) by mouth every 6 (six) hours as needed for Nausea.    SERTRALINE (ZOLOFT) 50 MG TABLET        TOPIRAMATE (TOPAMAX) 25 MG TABLET    Take 25 mg by mouth once daily.     XULANE 150-35 MCG/24 HR    UNWRAP AND APPLY 1 PATCH TO SKIN EVERY WEEK FOR 3 WEEKS, THEN REMOVE PATCH FOR 4TH WEEK       PMH:   Past Medical History:   Diagnosis Date    Mental disorder     anxiety/depression     Migraines      PSH: History reviewed. No pertinent surgical history.  Allergies: She has no known allergies.  Social History: Marital Status: . She  reports that she has never smoked. She has never used smokeless tobacco.. She  reports no history of alcohol use..       Exam:  VITAL SIGNS:   Vitals:    06/17/25 0145 06/17/25 0200 06/17/25 0215 06/17/25 0300   BP: 130/84      BP Location: Right arm      Patient Position: Lying      Pulse: 94 93 88 79   Resp: 20      Temp:       TempSrc:       SpO2: 98% 98%  97%   Weight:       Height:         Const: Awake and alert, NAD  Head: Atraumatic  Eyes: Normal Conjunctiva  ENT: Normal External Ears, Nose and Mouth.  Neck: Full range of motion. No meningismus.  Resp: Normal respiratory effort, No distress, CTAB  Cardio: Equal and intact distal pulses, RRR  Abd: Soft, non tender, non distended.   Skin: No petechiae or rashes  Ext: No cyanosis, or edema  Neur: Awake and alert, moves all extremities  equally, cranial nerves grossly intact  Psych:  Slightly anxious, denies SI.    Data:  Results for orders placed or performed during the hospital encounter of 25   POCT urine pregnancy    Collection Time: 25  2:46 AM   Result Value Ref Range    POC Preg Test, Ur Negative Negative     Acceptable Yes    ISTAT PROCEDURE    Collection Time: 25  3:04 AM   Result Value Ref Range    POC Glucose 105 70 - 110 mg/dL    POC BUN 5 (L) 6 - 30 mg/dL    POC Creatinine 0.7 0.5 - 1.4 mg/dL    POC Sodium 138 136 - 145 mmol/L    POC Potassium 3.8 3.5 - 5.1 mmol/L    POC Chloride 106 95 - 110 mmol/L    POC TCO2 (MEASURED) 21 (L) 23 - 29 mmol/L    POC Ionized Calcium 1.06 1.06 - 1.42 mmol/L    POC Hematocrit 37 36 - 54 %PCV    Sample KARELY      Imaging Results    None       12-LEAD EKG INTERPRETATION BY ME:  Rate/Rhythm: Normal Sinus Rhythm with rate of 80 beats per minute  QRS, ST, T-waves: No changes consistent with acute ischemia  Impression: No evidence of ischemia or arrhythmia     Labs & Imaging studies were reviewed independently by me.     Medical Decision Makin-year-old female returning to the emergency department with concern for elevated blood pressure, feeling anxious.  Blood pressure normal upon arrival to the emergency department.  She did take an extra dose of amlodipine, was recently started on this medication, unclear as to whether she actually does need a 10 mg instead of 5 mg dose. No signs of hypotension. She was encouraged to take the medication as prescribed and to take her blood pressure at the same time every day and to see her primary physician for medication adjustments as an outpatient.  Symptoms had largely resolved during her stay in the emergency department.  Electrolytes are normal.  Pregnancy test is negative, doubt ectopic.  She has a normal EKG without arrhythmias.  She was instructed to take her new anxiety medication as prescribed by her primary physician.  A  referral to Psychiatry has been placed 2 days ago, she did reports her primary physician as also placed referrals.  She is encouraged to find a psychiatrist and or a therapist.  Strict return precautions were discussed, and patient is agreeable with the plan.    Upon discharge, patient reports she feels more anxious about going home, we would like to try medication to help her acute anxiety as the hydroxyzine did not work.  We will give a 1 time dose of Ativan prior to discharge.  Mom is driving.  On reassessment, symptoms improved.  Vital signs remained stable.  She is stable for discharge home with outpatient follow up.      Clinical Impression:  1. Anxiety    2. History of hypertension             Medication List        ASK your doctor about these medications      (MAGIC MOUTHWASH) 1:1:1 BENADRYL 12.5MG/5ML LIQ, ALUMINUM & MAGNESIUM, LIDOCAINE VISC 2%  Swish and spit 5 mLs every 4 (four) hours as needed (sore throat).     acetaminophen 500 MG tablet  Commonly known as: TYLENOL  Take 1 tablet (500 mg total) by mouth every 6 (six) hours as needed for Pain.     butalbital-acetaminophen-caffeine -40 mg -40 mg per tablet  Commonly known as: FIORICET, ESGIC  Take 1 tablet by mouth every 6 (six) hours as needed for Pain (headache).     hydrOXYzine HCL 25 MG tablet  Commonly known as: ATARAX  Take 1 tablet (25 mg total) by mouth every 6 (six) hours.     ibuprofen 800 MG tablet  Commonly known as: ADVIL,MOTRIN  TAKE 1 TABLET(800 MG) BY MOUTH EVERY 8 HOURS AS NEEDED FOR PAIN     metoclopramide HCl 10 MG tablet  Commonly known as: REGLAN  Take 1 tablet (10 mg total) by mouth every 6 (six) hours as needed.     NIFEdipine 30 MG (OSM) 24 hr tablet  Commonly known as: PROCARDIA-XL  Take 1 tablet (30 mg total) by mouth once daily.     * ondansetron 4 MG Tbdl  Commonly known as: ZOFRAN-ODT  Take 1 tablet (4 mg total) by mouth every 6 (six) hours as needed (nausea).     * ondansetron 4 MG Tbdl  Commonly known as:  ZOFRAN-ODT  Take 1 tablet (4 mg total) by mouth every 6 (six) hours as needed.     oxyCODONE-acetaminophen 5-325 mg per tablet  Commonly known as: PERCOCET  Take 1 tablet by mouth every 4 (four) hours as needed for Pain.     pantoprazole 40 MG tablet  Commonly known as: PROTONIX  Take 1 tablet (40 mg total) by mouth once daily.     promethazine 25 MG tablet  Commonly known as: PHENERGAN  Take 1 tablet (25 mg total) by mouth every 6 (six) hours as needed for Nausea.     sertraline 50 MG tablet  Commonly known as: ZOLOFT     topiramate 25 MG tablet  Commonly known as: TOPAMAX     XULANE 150-35 mcg/24 hr  Generic drug: norelgestromin-ethinyl estradiol  UNWRAP AND APPLY 1 PATCH TO SKIN EVERY WEEK FOR 3 WEEKS, THEN REMOVE PATCH FOR 4TH WEEK           * This list has 2 medication(s) that are the same as other medications prescribed for you. Read the directions carefully, and ask your doctor or other care provider to review them with you.                  Follow-up Information       [facility] [address].    Specialty: Psychiatry  Contact information:  [address]  [facility] [address] [phone]  Additional information:  [facility] [address], [address]   Please park in [facility] and use [facility] elevator                             Maria Victoria Vera MD  06/17/25 [id number]       Maria Victoria Vera MD  06/17/25 [id number]

## 2025-06-17 NOTE — ED NOTES
I-STAT Chem-8+ Results:   Value Reference Range   Sodium 138 136-145 mmol/L   Potassium  3.8 3.5-5.1 mmol/L   Chloride 106  mmol/L   Ionized Calcium 1.06 1.06-1.42 mmol/L   CO2 (measured) 21 23-29 mmol/L   Glucose 105  mg/dL   BUN 5 6-30 mg/dL   Creatinine 0.7 0.5-1.4 mg/dL   Hematocrit 37 36-54%

## 2025-06-17 NOTE — ED TRIAGE NOTES
Brannon Lei, a 34 y.o. female presents to the ED w/ complaint of     Triage note:  Chief Complaint   Patient presents with    Hypertension     Called EMS for hypertension, upon EMS arrival, pt's BP within normal range     Review of patient's allergies indicates:  No Known Allergies  Past Medical History:   Diagnosis Date    Mental disorder     anxiety/depression     Migraines

## 2025-06-17 NOTE — Clinical Note
"Brannon Smith" Quique was seen and treated in our emergency department on 6/17/2025.  She may return to work on 06/21/2025.       If you have any questions or concerns, please don't hesitate to call.      Maria Victoria Vera MD"